# Patient Record
Sex: MALE | Race: WHITE | NOT HISPANIC OR LATINO | Employment: OTHER | URBAN - METROPOLITAN AREA
[De-identification: names, ages, dates, MRNs, and addresses within clinical notes are randomized per-mention and may not be internally consistent; named-entity substitution may affect disease eponyms.]

---

## 2017-06-30 ENCOUNTER — LAB VISIT (OUTPATIENT)
Dept: LAB | Facility: OTHER | Age: 47
End: 2017-06-30
Attending: INTERNAL MEDICINE
Payer: COMMERCIAL

## 2017-06-30 ENCOUNTER — TELEPHONE (OUTPATIENT)
Dept: INTERNAL MEDICINE | Facility: CLINIC | Age: 47
End: 2017-06-30

## 2017-06-30 ENCOUNTER — OFFICE VISIT (OUTPATIENT)
Dept: INTERNAL MEDICINE | Facility: CLINIC | Age: 47
End: 2017-06-30
Attending: INTERNAL MEDICINE
Payer: COMMERCIAL

## 2017-06-30 VITALS
OXYGEN SATURATION: 97 % | BODY MASS INDEX: 30.87 KG/M2 | DIASTOLIC BLOOD PRESSURE: 90 MMHG | HEIGHT: 75 IN | HEART RATE: 77 BPM | TEMPERATURE: 98 F | WEIGHT: 248.25 LBS | SYSTOLIC BLOOD PRESSURE: 132 MMHG

## 2017-06-30 DIAGNOSIS — Z00.00 ANNUAL PHYSICAL EXAM: Primary | ICD-10-CM

## 2017-06-30 DIAGNOSIS — Z00.00 ANNUAL PHYSICAL EXAM: ICD-10-CM

## 2017-06-30 DIAGNOSIS — Z11.3 SCREEN FOR STD (SEXUALLY TRANSMITTED DISEASE): ICD-10-CM

## 2017-06-30 DIAGNOSIS — R03.0 ELEVATED BLOOD PRESSURE READING: ICD-10-CM

## 2017-06-30 DIAGNOSIS — Z80.8 FAMILY HISTORY OF MELANOMA: ICD-10-CM

## 2017-06-30 DIAGNOSIS — J40 BRONCHITIS: ICD-10-CM

## 2017-06-30 LAB
ALBUMIN SERPL BCP-MCNC: 4 G/DL
ALP SERPL-CCNC: 66 U/L
ALT SERPL W/O P-5'-P-CCNC: 43 U/L
ANION GAP SERPL CALC-SCNC: 7 MMOL/L
AST SERPL-CCNC: 26 U/L
BASOPHILS # BLD AUTO: 0.02 K/UL
BASOPHILS NFR BLD: 0.4 %
BILIRUB SERPL-MCNC: 0.5 MG/DL
BUN SERPL-MCNC: 10 MG/DL
CALCIUM SERPL-MCNC: 9.3 MG/DL
CHLORIDE SERPL-SCNC: 105 MMOL/L
CHOLEST/HDLC SERPL: 6.5 {RATIO}
CO2 SERPL-SCNC: 27 MMOL/L
CREAT SERPL-MCNC: 1 MG/DL
DIFFERENTIAL METHOD: ABNORMAL
EOSINOPHIL # BLD AUTO: 0.1 K/UL
EOSINOPHIL NFR BLD: 1.3 %
ERYTHROCYTE [DISTWIDTH] IN BLOOD BY AUTOMATED COUNT: 12 %
EST. GFR  (AFRICAN AMERICAN): >60 ML/MIN/1.73 M^2
EST. GFR  (NON AFRICAN AMERICAN): >60 ML/MIN/1.73 M^2
GLUCOSE SERPL-MCNC: 113 MG/DL
HCT VFR BLD AUTO: 41.8 %
HDL/CHOLESTEROL RATIO: 15.3 %
HDLC SERPL-MCNC: 261 MG/DL
HDLC SERPL-MCNC: 40 MG/DL
HGB BLD-MCNC: 14.3 G/DL
LDLC SERPL CALC-MCNC: 181.8 MG/DL
LYMPHOCYTES # BLD AUTO: 2 K/UL
LYMPHOCYTES NFR BLD: 35.8 %
MCH RBC QN AUTO: 32.3 PG
MCHC RBC AUTO-ENTMCNC: 34.2 %
MCV RBC AUTO: 94 FL
MONOCYTES # BLD AUTO: 0.4 K/UL
MONOCYTES NFR BLD: 7.7 %
NEUTROPHILS # BLD AUTO: 3 K/UL
NEUTROPHILS NFR BLD: 54.6 %
NONHDLC SERPL-MCNC: 221 MG/DL
PLATELET # BLD AUTO: 279 K/UL
PMV BLD AUTO: 9.1 FL
POTASSIUM SERPL-SCNC: 4.4 MMOL/L
PROT SERPL-MCNC: 7.3 G/DL
RBC # BLD AUTO: 4.43 M/UL
SODIUM SERPL-SCNC: 139 MMOL/L
TRIGL SERPL-MCNC: 196 MG/DL
TSH SERPL DL<=0.005 MIU/L-ACNC: 1.33 UIU/ML
WBC # BLD AUTO: 5.48 K/UL

## 2017-06-30 PROCEDURE — 80061 LIPID PANEL: CPT

## 2017-06-30 PROCEDURE — 36415 COLL VENOUS BLD VENIPUNCTURE: CPT

## 2017-06-30 PROCEDURE — 87591 N.GONORRHOEAE DNA AMP PROB: CPT

## 2017-06-30 PROCEDURE — 86592 SYPHILIS TEST NON-TREP QUAL: CPT

## 2017-06-30 PROCEDURE — 85025 COMPLETE CBC W/AUTO DIFF WBC: CPT

## 2017-06-30 PROCEDURE — 99396 PREV VISIT EST AGE 40-64: CPT | Mod: S$GLB,,, | Performed by: INTERNAL MEDICINE

## 2017-06-30 PROCEDURE — 87340 HEPATITIS B SURFACE AG IA: CPT

## 2017-06-30 PROCEDURE — 84443 ASSAY THYROID STIM HORMONE: CPT

## 2017-06-30 PROCEDURE — 99999 PR PBB SHADOW E&M-EST. PATIENT-LVL IV: CPT | Mod: PBBFAC,,, | Performed by: INTERNAL MEDICINE

## 2017-06-30 PROCEDURE — 80053 COMPREHEN METABOLIC PANEL: CPT

## 2017-06-30 PROCEDURE — 86803 HEPATITIS C AB TEST: CPT

## 2017-06-30 PROCEDURE — 83036 HEMOGLOBIN GLYCOSYLATED A1C: CPT

## 2017-06-30 PROCEDURE — 86703 HIV-1/HIV-2 1 RESULT ANTBDY: CPT

## 2017-06-30 RX ORDER — DOXYCYCLINE 100 MG/1
100 CAPSULE ORAL 2 TIMES DAILY
Qty: 14 CAPSULE | Refills: 0 | Status: SHIPPED | OUTPATIENT
Start: 2017-06-30 | End: 2017-07-07

## 2017-06-30 NOTE — PROGRESS NOTES
"Subjective:       Patient ID: Michael Lowe is a 46 y.o. male.    Chief Complaint: URI    Here for annual visit    He has gained weight and is not exercising. He is aware of his dietary indiscretions and changes that need to occur. He has been doing some pushup lately which resulted in bilateral epicondylitis. He is interested in getting back into cycling and/or swimming. Productive cough for the past month that has continued to worsen over the last week. Patient denies F/C, SOB, eye pain, severe HA, or inability to maintain adequate PO intake. He has multiple right axillary skin tags that he would like removed. He would like STD screening. He is asymptomatic.                Review of Systems   Constitutional: Negative for appetite change, chills, fever and unexpected weight change.   HENT: Negative for hearing loss, sore throat and trouble swallowing.    Eyes: Negative for visual disturbance.   Respiratory: Negative for cough, chest tightness and shortness of breath.    Cardiovascular: Negative for chest pain and leg swelling.   Gastrointestinal: Negative for abdominal pain, blood in stool, constipation, diarrhea, nausea and vomiting.   Endocrine: Negative for polydipsia and polyuria.   Genitourinary: Negative for decreased urine volume, difficulty urinating, dysuria, frequency and urgency.   Musculoskeletal: Positive for arthralgias. Negative for gait problem.   Skin: Negative for rash.   Neurological: Negative for dizziness and numbness.   Psychiatric/Behavioral: The patient is not nervous/anxious.        Objective:      Vitals:    06/30/17 1048   BP: (!) 132/90   Pulse: 77   Temp: 98.3 °F (36.8 °C)   TempSrc: Oral   SpO2: 97%   Weight: 112.6 kg (248 lb 3.8 oz)   Height: 6' 3" (1.905 m)      Physical Exam   Constitutional: He is oriented to person, place, and time. He appears well-developed and well-nourished. No distress.   HENT:   Head: Normocephalic and atraumatic.   Mouth/Throat: Oropharynx is clear " and moist. No oropharyngeal exudate.   Eyes: Conjunctivae and EOM are normal. Pupils are equal, round, and reactive to light. No scleral icterus.   Neck: No thyromegaly present.   Cardiovascular: Normal rate, regular rhythm and normal heart sounds.    No murmur heard.  Pulmonary/Chest: Effort normal and breath sounds normal. He has no wheezes. He has no rales.   Abdominal: Soft. He exhibits no distension. There is no tenderness.   Musculoskeletal: He exhibits no edema or tenderness.   Lymphadenopathy:     He has no cervical adenopathy.   Neurological: He is alert and oriented to person, place, and time.   Skin: Skin is warm and dry.   Psychiatric: He has a normal mood and affect. His behavior is normal.       Assessment:       1. Annual physical exam    2. Family history of melanoma    3. Screen for STD (sexually transmitted disease)    4. Bronchitis    5. Elevated blood pressure reading        Plan:       Michael was seen today for uri.    Diagnoses and all orders for this visit:    Annual physical exam  -     Hemoglobin A1c; Future  -     Comprehensive metabolic panel; Future  -     CBC auto differential; Future  -     TSH; Future  -     Lipid panel; Future    Family history of melanoma  -     Ambulatory referral to Dermatology    Screen for STD (sexually transmitted disease)  -     Hepatitis C antibody; Future  -     C. trachomatis/N. gonorrhoeae by AMP DNA Urine; Future  -     Hepatitis B surface antigen; Future  -     RPR; Future  -     HIV-1 and HIV-2 antibodies; Future    Bronchitis  -     doxycycline (VIBRAMYCIN) 100 MG Cap; Take 1 capsule (100 mg total) by mouth 2 (two) times daily.    Elevated BP  -asymptomatic, borderline. Pt has requested diet/lifesyle intervention prior to medication initiation. F/U in 6 months to discuss. Office prompts discussed.         RTC in 6 months or sooner prn         Side effects of medication(s) were discussed in detail and patient voiced understanding.  Patient will call back  for any issues or complications.

## 2017-06-30 NOTE — PATIENT INSTRUCTIONS
Understanding Medial Epicondylitis    Several muscles attach to the arm at the elbow joint. The tough bands of tissue that attach muscle to bones are called tendons. The bone in the upper arm has knobs on the farthest end called epicondyles. Tendons attach some arm muscles to these knobs. The tissues in this area can become irritated.  Epicondylitis is the medical term for a painful elbow over the epicondyle. Medial refers to the inner side of the elbow. Medial epicondylitis is sometimes called golfers elbow.     How to say it  [facility]dye-lie-tis   Causes of medial epicondylitis  A painful inner elbow may be caused by:  · Using an elbow or hand the same way over and over  · Using poor form or too much force in a sport such as golf, tennis, or baseball  · Lifting too heavy a weight  · Other injuries to the arm or elbow  Symptoms of medial epicondylitis  · Pain or tenderness on the inside of the elbow that may travel down the forearm  · Pain when moving the wrist  · Pain or weakness when gripping something  · A crackling sound or grating feeling when moving the elbow  Treatment for medial epicondylitis  Treatments may include:  · Avoiding or changing the action that caused the problem. This helps prevent irritating the tissues more.  · Prescription or over-the-counter pain medicines. These help reduce inflammation, swelling, and pain.  · Cold or heat packs. These help reduce pain and swelling.  · Stretching and other exercises. These improve flexibility and strength.  · Physical therapy. This may include exercises or other treatments.  · Injections of medicine. This may relieve symptoms.  If other treatments do not relieve symptoms, you may need surgery.  Possible complications  If you dont give your elbow time to heal, symptoms may return or get worse. Follow your healthcare providers instructions on resting and treating your elbow.     When to call your healthcare provider  Call your  healthcare provider right away if you have any of these:  · Fever of 100.4°F (38°C) or higher, or as directed  · Redness, swelling, or warmth that gets worse  · Symptoms that dont get better with prescribed medicines, or get worse  · New symptoms   Date Last Reviewed: 3/10/2016  © 7585-3172 [employer]. [address] [place] [address], [place] [address]. All rights reserved. This information is not intended as a substitute for professional medical care. Always follow your healthcare professional's instructions.

## 2017-06-30 NOTE — TELEPHONE ENCOUNTER
Pt was seen in clinic on today.  He left to complete labs and came back in clinic b/c he didn't see an antibiotics that pt stated  said he was sending to pt pharmacy for cough and cold on his AVS.    Pt ask is  still sending that RX over or was it something he can  over the counter?    Please authorize and advise

## 2017-07-01 LAB
C TRACH DNA SPEC QL NAA+PROBE: NOT DETECTED
ESTIMATED AVG GLUCOSE: 126 MG/DL
HBA1C MFR BLD HPLC: 6 %
N GONORRHOEA DNA SPEC QL NAA+PROBE: NOT DETECTED

## 2017-07-03 LAB
HBV SURFACE AG SERPL QL IA: NEGATIVE
HCV AB SERPL QL IA: NEGATIVE
HIV 1+2 AB+HIV1 P24 AG SERPL QL IA: NEGATIVE
RPR SER QL: NORMAL

## 2017-07-20 ENCOUNTER — PATIENT MESSAGE (OUTPATIENT)
Dept: INTERNAL MEDICINE | Facility: CLINIC | Age: 47
End: 2017-07-20

## 2017-07-21 NOTE — TELEPHONE ENCOUNTER
Unknown. This is not a routine test and has not ever been done on our record. We usually do not test for this.

## 2018-02-06 ENCOUNTER — OFFICE VISIT (OUTPATIENT)
Dept: INTERNAL MEDICINE | Facility: CLINIC | Age: 48
End: 2018-02-06
Attending: INTERNAL MEDICINE
Payer: COMMERCIAL

## 2018-02-06 ENCOUNTER — HOSPITAL ENCOUNTER (OUTPATIENT)
Dept: RADIOLOGY | Facility: OTHER | Age: 48
Discharge: HOME | End: 2018-02-06
Attending: INTERNAL MEDICINE
Payer: COMMERCIAL

## 2018-02-06 VITALS
DIASTOLIC BLOOD PRESSURE: 72 MMHG | HEIGHT: 75 IN | HEART RATE: 83 BPM | SYSTOLIC BLOOD PRESSURE: 138 MMHG | WEIGHT: 250.69 LBS | BODY MASS INDEX: 31.17 KG/M2

## 2018-02-06 DIAGNOSIS — R07.81 RIB PAIN ON RIGHT SIDE: ICD-10-CM
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
## 2018-02-06 PROCEDURE — 3008F BODY MASS INDEX DOCD: CPT | Mod: S$GLB,,, | Performed by: INTERNAL MEDICINE

## 2018-02-06 PROCEDURE — 99213 OFFICE O/P EST LOW 20 MIN: CPT | Mod: S$GLB,,, | Performed by: INTERNAL MEDICINE

## 2018-02-06 PROCEDURE — 71110 X-RAY EXAM RIBS BIL 3 VIEWS: CPT | Mod: 26,,, | Performed by: RADIOLOGY

## 2018-02-06 PROCEDURE — 99999 PR PBB SHADOW E&M-EST. PATIENT-LVL III: CPT | Mod: PBBFAC,,, | Performed by: INTERNAL MEDICINE

## 2018-02-06 PROCEDURE — 71110 X-RAY EXAM RIBS BIL 3 VIEWS: CPT | Mod: TC,FY

## 2018-02-06 RX ORDER — TIZANIDINE 2 MG/1
2-4 TABLET ORAL EVERY 6 HOURS PRN
Qty: 45 TABLET | Refills: 0 | Status: SHIPPED | OUTPATIENT
Start: 2018-02-06 | End: 2018-02-16

## 2018-02-06 RX ORDER — OXYCODONE AND ACETAMINOPHEN 5; 325 MG/1; MG/1
1 TABLET ORAL EVERY 4 HOURS PRN
Qty: 45 TABLET | Refills: 0 | Status: SHIPPED | OUTPATIENT
Start: 2018-02-06 | End: 2018-04-18

## 2018-02-06 NOTE — PROGRESS NOTES
"Subjective:       Patient ID: Michael Lowe is a 47 y.o. male.    Chief Complaint: Rib Injury    Here for urgent visit    3 days ago while inebriated in [place] he was walking along a leaning palm tree trunk and slipped and fell landing on right chest and then spun around and fell 5-6 ft [place]. No instant pain, again inebriated but the following day awoke with significant pain. He denies F/C, SOB, abd pain, constipation, BRPR.          Review of Systems    Objective:      Vitals:    02/06/18 1526   BP: 138/72   Pulse: 83   Weight: 113.7 kg (250 lb 10.6 oz)   Height: 6' 3" (1.905 m)      Physical Exam   Constitutional: He is oriented to person, place, and time. He appears well-developed and well-nourished. He does not have a sickly appearance. No distress.   HENT:   Head: Normocephalic and atraumatic.   Eyes: Conjunctivae and EOM are normal. Right eye exhibits no discharge. Left eye exhibits no discharge. No scleral icterus.   Pulmonary/Chest: Effort normal. No respiratory distress. He has no decreased breath sounds.   Abdominal: Normal appearance and bowel sounds are normal. He exhibits no distension. There is no tenderness.       Neurological: He is alert and oriented to person, place, and time.   Skin: Skin is warm and dry. He is not diaphoretic.   Psychiatric: He has a normal mood and affect. His speech is normal.       Assessment:       1. Fall, sequela    2. Rib pain on right side        Plan:       Michael was seen today for rib injury.    Diagnoses and all orders for this visit:    Fall, sequela  -     X-Ray Ribs 3 Views Bilateral; Future    Rib pain on right side  -     X-Ray Ribs 3 Views Bilateral; Future  -     tiZANidine (ZANAFLEX) 2 MG tablet; Take 1-2 tablets (2-4 mg total) by mouth every 6 (six) hours as needed.  -     oxyCODONE-acetaminophen (PERCOCET) 5-325 mg per tablet; Take 1 tablet by mouth every 4 (four) hours as needed for Pain.             Side effects of medication(s) " were discussed in detail and patient voiced understanding.  Patient will call back for any issues or complications.

## 2018-04-18 ENCOUNTER — PATIENT MESSAGE (OUTPATIENT)
Dept: INTERNAL MEDICINE | Facility: CLINIC | Age: 48
End: 2018-04-18

## 2018-04-18 ENCOUNTER — OFFICE VISIT (OUTPATIENT)
Dept: INTERNAL MEDICINE | Facility: CLINIC | Age: 48
End: 2018-04-18
Attending: INTERNAL MEDICINE
Payer: COMMERCIAL

## 2018-04-18 ENCOUNTER — HOSPITAL ENCOUNTER (OUTPATIENT)
Dept: RADIOLOGY | Facility: OTHER | Age: 48
Discharge: HOME | End: 2018-04-18
Attending: INTERNAL MEDICINE
Payer: COMMERCIAL

## 2018-04-18 ENCOUNTER — HOSPITAL ENCOUNTER (OUTPATIENT)
Dept: CARDIOLOGY | Facility: OTHER | Age: 48
Discharge: HOME | End: 2018-04-18
Attending: INTERNAL MEDICINE
Payer: COMMERCIAL

## 2018-04-18 VITALS
DIASTOLIC BLOOD PRESSURE: 70 MMHG | HEART RATE: 86 BPM | BODY MASS INDEX: 30.42 KG/M2 | RESPIRATION RATE: 96 BRPM | SYSTOLIC BLOOD PRESSURE: 116 MMHG | WEIGHT: 244.69 LBS | HEIGHT: 75 IN

## 2018-04-18 DIAGNOSIS — Z00.00 ANNUAL PHYSICAL EXAM: Primary | ICD-10-CM

## 2018-04-18 DIAGNOSIS — Z91.89 AT RISK FOR CORONARY ARTERY DISEASE: ICD-10-CM

## 2018-04-18 DIAGNOSIS — R93.1 ELEVATED CORONARY ARTERY CALCIUM SCORE: Primary | ICD-10-CM

## 2018-04-18 DIAGNOSIS — R07.2 PRECORDIAL PAIN: ICD-10-CM

## 2018-04-18 DIAGNOSIS — G56.21 ULNAR NEUROPATHY AT ELBOW, RIGHT: ICD-10-CM

## 2018-04-18 PROCEDURE — 75571 CT HRT W/O DYE W/CA TEST: CPT | Mod: 26,,, | Performed by: RADIOLOGY

## 2018-04-18 PROCEDURE — 99214 OFFICE O/P EST MOD 30 MIN: CPT | Mod: S$GLB,,, | Performed by: INTERNAL MEDICINE

## 2018-04-18 PROCEDURE — 99999 PR PBB SHADOW E&M-EST. PATIENT-LVL IV: CPT | Mod: PBBFAC,,, | Performed by: INTERNAL MEDICINE

## 2018-04-18 PROCEDURE — 93010 ELECTROCARDIOGRAM REPORT: CPT | Mod: ,,, | Performed by: INTERNAL MEDICINE

## 2018-04-18 PROCEDURE — 93005 ELECTROCARDIOGRAM TRACING: CPT

## 2018-04-18 PROCEDURE — 75571 CT HRT W/O DYE W/CA TEST: CPT | Mod: TC

## 2018-04-18 RX ORDER — NAPROXEN SODIUM 220 MG/1
81 TABLET, FILM COATED ORAL DAILY
Start: 2018-04-18 | End: 2018-05-10

## 2018-04-18 RX ORDER — ATORVASTATIN CALCIUM 40 MG/1
40 TABLET, FILM COATED ORAL DAILY
Qty: 90 TABLET | Refills: 3 | Status: SHIPPED | OUTPATIENT
Start: 2018-04-18 | End: 2018-05-01 | Stop reason: SDUPTHER

## 2018-04-18 NOTE — PROGRESS NOTES
"Subjective:       Patient ID: Michael Lowe is a 47 y.o. male.    Chief Complaint: Annual Exam    Here for urgent visit    2 month hx of elbow pain. Symptoms are medially located, occur intermittently with certain movements, described as an ache and tightness. Hx of right ulnar neuropathy surgical release as well as right CTS release and resulting right finger triggering and stiffness. He denies any new weakness.     Riding his bike more in attempts to be healthier. He reports chest tightness that is substernal that only occurs with riding his bike very intensely and subsides with rest. This does not occur consistently. Also has random CP at rest that last seconds to minutes. No associated symptoms. Hx of elevated LDL not on statin. Not a smoker.           Review of Systems   Constitutional: Negative for appetite change, chills, fever and unexpected weight change.   HENT: Negative for hearing loss, sore throat and trouble swallowing.    Eyes: Negative for visual disturbance.   Respiratory: Negative for cough, chest tightness and shortness of breath.    Cardiovascular: Positive for chest pain. Negative for leg swelling.   Gastrointestinal: Negative for abdominal pain, blood in stool, constipation, diarrhea, nausea and vomiting.   Endocrine: Negative for polydipsia and polyuria.   Genitourinary: Negative for decreased urine volume, difficulty urinating, dysuria, frequency and urgency.   Musculoskeletal: Positive for arthralgias. Negative for gait problem.   Skin: Negative for rash.   Neurological: Negative for dizziness and numbness.   Psychiatric/Behavioral: The patient is not nervous/anxious.        Objective:      Vitals:    04/18/18 0830   BP: 116/70   Pulse: 86   Resp: (!) 96   Weight: 111 kg (244 lb 11.4 oz)   Height: 6' 3" (1.905 m)      Physical Exam   Constitutional: He is oriented to person, place, and time. He appears well-developed and well-nourished. No distress.   HENT:   Head: Normocephalic and " atraumatic.   Mouth/Throat: Oropharynx is clear and moist. No oropharyngeal exudate.   Eyes: Conjunctivae and EOM are normal. Pupils are equal, round, and reactive to light. No scleral icterus.   Neck: No thyromegaly present.   Cardiovascular: Normal rate, regular rhythm and normal heart sounds.    No murmur heard.  Pulmonary/Chest: Effort normal and breath sounds normal. He has no wheezes. He has no rales.   Abdominal: Soft. He exhibits no distension. There is no tenderness.   Musculoskeletal: He exhibits no edema or tenderness.   Lymphadenopathy:     He has no cervical adenopathy.   Neurological: He is alert and oriented to person, place, and time.   Skin: Skin is warm and dry.   Psychiatric: He has a normal mood and affect. His behavior is normal.       Assessment:       1. Annual physical exam    2. Ulnar neuropathy at elbow, right    3. At risk for coronary artery disease        Plan:       Michael was seen today for annual exam.    Diagnoses and all orders for this visit:    Annual physical exam  -     Comprehensive metabolic panel; Future  -     Lipid panel; Future  -     TSH; Future  -     CBC auto differential; Future  -     Hemoglobin A1c; Future    Ulnar neuropathy at elbow, right  -     Ambulatory referral to Orthopedics    At risk for coronary artery disease  -     CT Cardiac Scoring; Future  -     SCHEDULED EKG 12-LEAD (to Muse); Future       RTC in 3 months or sooner prn           Side effects of medication(s) were discussed in detail and patient voiced understanding.  Patient will call back for any issues or complications.

## 2018-04-20 ENCOUNTER — TELEPHONE (OUTPATIENT)
Dept: CARDIOLOGY | Facility: CLINIC | Age: 48
End: 2018-04-20

## 2018-04-20 NOTE — TELEPHONE ENCOUNTER
----- Message from Jennifer Arellano sent at 4/19/2018  8:53 AM CDT -----  Regarding: EKG order  Please put the EKG order in for the new pt. He's scheduled 5/1/18 at 3 pm    Thanks

## 2018-05-01 ENCOUNTER — HOSPITAL ENCOUNTER (OUTPATIENT)
Dept: CARDIOLOGY | Facility: CLINIC | Age: 48
Discharge: HOME | End: 2018-05-01
Payer: COMMERCIAL

## 2018-05-01 ENCOUNTER — OFFICE VISIT (OUTPATIENT)
Dept: CARDIOLOGY | Facility: CLINIC | Age: 48
End: 2018-05-01
Payer: COMMERCIAL

## 2018-05-01 VITALS
HEART RATE: 79 BPM | BODY MASS INDEX: 30.73 KG/M2 | WEIGHT: 247.13 LBS | HEIGHT: 75 IN | DIASTOLIC BLOOD PRESSURE: 74 MMHG | SYSTOLIC BLOOD PRESSURE: 147 MMHG

## 2018-05-01 DIAGNOSIS — R00.2 PALPITATION: ICD-10-CM

## 2018-05-01 DIAGNOSIS — E78.00 PURE HYPERCHOLESTEROLEMIA: ICD-10-CM

## 2018-05-01 DIAGNOSIS — I25.10 CORONARY ARTERY DISEASE INVOLVING NATIVE CORONARY ARTERY OF NATIVE HEART WITHOUT ANGINA PECTORIS: ICD-10-CM

## 2018-05-01 PROCEDURE — 99999 PR PBB SHADOW E&M-EST. PATIENT-LVL III: CPT | Mod: PBBFAC,,, | Performed by: INTERNAL MEDICINE

## 2018-05-01 PROCEDURE — 99244 OFF/OP CNSLTJ NEW/EST MOD 40: CPT | Mod: S$GLB,,, | Performed by: INTERNAL MEDICINE

## 2018-05-01 PROCEDURE — 93000 ELECTROCARDIOGRAM COMPLETE: CPT | Mod: S$GLB,,, | Performed by: INTERNAL MEDICINE

## 2018-05-01 RX ORDER — ASPIRIN 81 MG/1
81 TABLET ORAL DAILY
Qty: 1 TABLET | Refills: 0 | COMMUNITY
Start: 2018-05-01 | End: 2018-05-10

## 2018-05-01 RX ORDER — ATORVASTATIN CALCIUM 80 MG/1
80 TABLET, FILM COATED ORAL DAILY
Qty: 90 TABLET | Refills: 3 | Status: SHIPPED | OUTPATIENT
Start: 2018-05-01 | End: 2018-05-22 | Stop reason: SDUPTHER

## 2018-05-01 NOTE — PROGRESS NOTES
"Subjective:    Patient ID:  Michael Lowe is a 47 y.o. male who presents for evaluation of Elevated coronary artery calcium score and Precordial pain      HPI     The patient is a 47 year old male referred by Dr Salazar who ordered a CACs as a CV risk assessment and was 146. His LDL is 204. 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 He reports occasional non exertional left lateral chest "fluttering". There is no family history of CAD and has no hypertension or diabetes. EKG today is normal      Lab Results   Component Value Date     04/18/2018    K 4.1 04/18/2018     04/18/2018    CO2 24 04/18/2018    BUN 13 04/18/2018    CREATININE 1.0 04/18/2018     (H) 04/18/2018    HGBA1C 5.6 04/18/2018    AST 34 04/18/2018    ALT 45 (H) 04/18/2018    ALBUMIN 4.2 04/18/2018    PROT 7.4 04/18/2018    BILITOT 0.8 04/18/2018    WBC 4.84 04/18/2018    HGB 14.4 04/18/2018    HCT 43.1 04/18/2018    MCV 96 04/18/2018     04/18/2018    PSA 0.80 08/19/2013    TSH 1.335 04/18/2018         Lab Results   Component Value Date    CHOL 284 (H) 04/18/2018    HDL 47 04/18/2018    TRIG 163 (H) 04/18/2018       Lab Results   Component Value Date    LDLCALC 204.4 (H) 04/18/2018       Past Medical History:   Diagnosis Date    Carpal tunnel syndrome of right wrist     Cervical disc herniation     Low back pain        Current Outpatient Prescriptions:     aspirin 81 MG Chew, Take 1 tablet (81 mg total) by mouth once daily., Disp: , Rfl:     atorvastatin (LIPITOR) 80 MG tablet, Take 1 tablet (80 mg total) by mouth once daily., Disp: 90 tablet, Rfl: 3    lysine 500 mg Tab, Take 1 tablet by mouth once daily. , Disp: , Rfl:     milk thistle 175 mg tablet, Take 175 mg by mouth once daily., Disp: , Rfl:     omega-3 fatty acids-vitamin E (FISH OIL) 1,000 mg Cap, Take 1 capsule by mouth once daily., Disp: , Rfl:     aspirin (ECOTRIN) 81 MG EC tablet, Take 1 tablet (81 mg total) by mouth once " "daily., Disp: 1 tablet, Rfl: 0        Review of Systems   Constitution: Negative for decreased appetite, diaphoresis, fever, weakness, malaise/fatigue, weight gain and weight loss.   HENT: Negative for congestion, ear discharge, ear pain and nosebleeds.    Eyes: Negative for blurred vision, double vision and visual disturbance.   Cardiovascular: Negative for chest pain, claudication, cyanosis, dyspnea on exertion, irregular heartbeat, leg swelling, near-syncope, orthopnea, palpitations, paroxysmal nocturnal dyspnea and syncope.   Respiratory: Negative for cough, hemoptysis, shortness of breath, sleep disturbances due to breathing, snoring, sputum production and wheezing.    Endocrine: Negative for polydipsia, polyphagia and polyuria.   Hematologic/Lymphatic: Negative for adenopathy and bleeding problem. Does not bruise/bleed easily.   Skin: Negative for color change, nail changes, poor wound healing and rash.   Musculoskeletal: Negative for muscle cramps and muscle weakness.   Gastrointestinal: Negative for abdominal pain, anorexia, change in bowel habit, hematochezia, nausea and vomiting.   Genitourinary: Negative for dysuria, frequency and hematuria.   Neurological: Negative for brief paralysis, difficulty with concentration, excessive daytime sleepiness, dizziness, focal weakness, headaches, light-headedness, seizures and vertigo.   Psychiatric/Behavioral: Negative for altered mental status and depression.   Allergic/Immunologic: Negative for persistent infections.        Objective:BP (!) 147/74 (BP Location: Left arm, Patient Position: Sitting, BP Method: X-Large (Automatic))   Pulse 79   Ht 6' 3" (1.905 m)   Wt 112.1 kg (247 lb 2.2 oz)   BMI 30.89 kg/m²           Physical Exam   Constitutional: He is oriented to person, place, and time. He appears well-developed and well-nourished.   Borderline obese   HENT:   Head: Normocephalic.   Right Ear: External ear normal.   Left Ear: External ear normal.   Nose: " Nose normal.   Inspection of lips, teeth and gums normal   Eyes: EOM are normal. Pupils are equal, round, and reactive to light. No scleral icterus.   Neck: Normal range of motion. Neck supple. No JVD present. No tracheal deviation present. No thyromegaly present.   Cardiovascular: Normal rate, regular rhythm and intact distal pulses.  Exam reveals no gallop and no friction rub.    No murmur heard.  Pulses:       Dorsalis pedis pulses are 2+ on the right side, and 2+ on the left side.        Posterior tibial pulses are 2+ on the right side, and 2+ on the left side.   Pulmonary/Chest: Effort normal and breath sounds normal.   Abdominal: Bowel sounds are normal. He exhibits no distension. There is no hepatosplenomegaly. There is no tenderness. There is no guarding.   Musculoskeletal: Normal range of motion. He exhibits no edema or tenderness.   Lymphadenopathy:   Palpation of neck and groin lymph nodes normal   Neurological: He is alert and oriented to person, place, and time. No cranial nerve deficit. He exhibits normal muscle tone. Coordination normal.   Skin: Skin is dry.   Palpation of skin normal   Psychiatric: His behavior is normal. Judgment and thought content normal.         Assessment:       1. Coronary artery disease involving native coronary artery of native heart without angina pectoris    2. Pure hypercholesterolemia         Plan:       Michael was seen today for elevated coronary artery calcium score and precordial pain.    Diagnoses and all orders for this visit:    Coronary artery disease involving native coronary artery of native heart without angina pectoris  -     Lipid panel; Future; Expected date: 06/12/2018  -     atorvastatin (LIPITOR) 80 MG tablet; Take 1 tablet (80 mg total) by mouth once daily.  -     aspirin (ECOTRIN) 81 MG EC tablet; Take 1 tablet (81 mg total) by mouth once daily.    Pure hypercholesterolemia LDL goal <100  -     Lipid panel; Future; Expected date: 06/12/2018  -      atorvastatin (LIPITOR) 80 MG tablet; Take 1 tablet (80 mg total) by mouth once daily.

## 2018-05-01 NOTE — LETTER
May 1, 2018      Basilio Salazar MD  [address]  [address]  [facility] [address]           [facility]  [address]  [place] [address]  Phone: [phone]          Patient: Michael Lowe   MR Number: [id number]   YOB: 1970   Date of Visit: 5/1/2018       Dear Dr. Basilio Salazar:    Thank you for referring Michael Lowe to me for evaluation. Attached you will find relevant portions of my assessment and plan of care.    If you have questions, please do not hesitate to call me. I look forward to following Michael Lowe along with you.    Sincerely,    Monty Plasencia MD    Enclosure  CC:  No Recipients    If you would like to receive this communication electronically, please contact [email or web address] or [phone] to request more information on [employer] Link access.    For providers and/or their staff who would like to refer a patient to [facility], please contact us through our one-stop-shop provider referral line, [facility], at [phone].    If you feel you have received this communication in error or would no longer like to receive these types of communications, please e-mail [email or web address]

## 2018-05-02 ENCOUNTER — TELEPHONE (OUTPATIENT)
Dept: CARDIOLOGY | Facility: CLINIC | Age: 48
End: 2018-05-02

## 2018-05-02 DIAGNOSIS — R00.2 PALPITATION: Primary | ICD-10-CM

## 2018-05-02 NOTE — TELEPHONE ENCOUNTER
----- Message from Jocelyn Patten sent at 5/2/2018  7:28 AM CDT -----  Regarding: EKG ORDER  Please put in EKG order, thanks. Jocelyn [phone]

## 2018-05-04 ENCOUNTER — TELEPHONE (OUTPATIENT)
Dept: ORTHOPEDICS | Facility: CLINIC | Age: 48
End: 2018-05-04

## 2018-05-04 NOTE — TELEPHONE ENCOUNTER
----- Message from Emiliana Cameron MA sent at 5/4/2018  2:33 PM CDT -----  Contact: JASPER DUMONT [[id number]]      ----- Message -----  From: Sarba Cody  Sent: 5/4/2018   1:14 PM  To: Varun Burrell Staff              Name of Who is Calling: JASPER DUMONT [[id number]]      What is the request in detail:pt returning staff call back       Can the clinic reply by [facility]: no      What Number to Call Back if not in [facility]:[phone]

## 2018-05-07 DIAGNOSIS — M25.521 RIGHT ELBOW PAIN: Primary | ICD-10-CM

## 2018-05-07 NOTE — PROGRESS NOTES
Phone call to patient to schedule NP appt within 14 days. Rescheduled 5/21 appt to 5/10. All locations/dates/times given to pt

## 2018-05-10 ENCOUNTER — OFFICE VISIT (OUTPATIENT)
Dept: ORTHOPEDICS | Facility: CLINIC | Age: 48
End: 2018-05-10
Attending: INTERNAL MEDICINE
Payer: COMMERCIAL

## 2018-05-10 ENCOUNTER — HOSPITAL ENCOUNTER (OUTPATIENT)
Dept: RADIOLOGY | Facility: OTHER | Age: 48
Discharge: HOME | End: 2018-05-10
Attending: PHYSICIAN ASSISTANT
Payer: COMMERCIAL

## 2018-05-10 VITALS
SYSTOLIC BLOOD PRESSURE: 148 MMHG | BODY MASS INDEX: 30.71 KG/M2 | DIASTOLIC BLOOD PRESSURE: 93 MMHG | WEIGHT: 247 LBS | HEIGHT: 75 IN | HEART RATE: 74 BPM

## 2018-05-10 DIAGNOSIS — R20.0 FINGER NUMBNESS: ICD-10-CM

## 2018-05-10 DIAGNOSIS — M62.549 ATROPHY OF INTEROSSEOUS MUSCLE OF HAND: Primary | ICD-10-CM

## 2018-05-10 DIAGNOSIS — G56.01 PARTIAL THENAR ATROPHY OF RIGHT HAND: ICD-10-CM

## 2018-05-10 DIAGNOSIS — M25.521 RIGHT ELBOW PAIN: ICD-10-CM

## 2018-05-10 PROCEDURE — 73080 X-RAY EXAM OF ELBOW: CPT | Mod: 26,RT,, | Performed by: RADIOLOGY

## 2018-05-10 PROCEDURE — 73080 X-RAY EXAM OF ELBOW: CPT | Mod: TC,FY,RT

## 2018-05-10 PROCEDURE — 3008F BODY MASS INDEX DOCD: CPT | Mod: CPTII,S$GLB,, | Performed by: PHYSICIAN ASSISTANT

## 2018-05-10 PROCEDURE — 99203 OFFICE O/P NEW LOW 30 MIN: CPT | Mod: S$GLB,,, | Performed by: PHYSICIAN ASSISTANT

## 2018-05-10 PROCEDURE — 99999 PR PBB SHADOW E&M-EST. PATIENT-LVL III: CPT | Mod: PBBFAC,,, | Performed by: PHYSICIAN ASSISTANT

## 2018-05-10 NOTE — LETTER
May 14, 2018      Basilio Salazar MD  [address]  [address]  [facility] [address]           [facility]  [address], [address]  [facility] [address]  Phone: [phone]          Patient: Michael Lowe   MR Number: [id number]   YOB: 1970   Date of Visit: 5/10/2018       Dear Dr. Basilio Salazar:    Thank you for referring Michael Lowe to me for evaluation. Attached you will find relevant portions of my assessment and plan of care.    If you have questions, please do not hesitate to call me. I look forward to following Michael Lowe along with you.    Sincerely,    JUAN JOSÉ Ware    Enclosure  CC:  No Recipients    If you would like to receive this communication electronically, please contact [email or web address] or [phone] to request more information on [employer] Link access.    For providers and/or their staff who would like to refer a patient to [facility], please contact us through our one-stop-shop provider referral line, [facility], at [phone].    If you feel you have received this communication in error or would no longer like to receive these types of communications, please e-mail [email or web address]

## 2018-05-10 NOTE — PROGRESS NOTES
Subjective:      Patient ID: Michael Lowe is a 47 y.o. male.    Chief Complaint: Pain of the Right Elbow      HPI  Michael Lowe is a  47 y.o. male presenting today for right hand tingling and weakness.  He reports that he is ambidextrous due to his right hand history.  History of right carpal tunnel release and right ulnar nerve release in 10/2013.  He reports that he did perform some hand occupational therapy approximately a year to year and half after surgery, he states that he has not noticed any significant changes in his symptoms or hand musculature since his surgery.  He does admit to occasional tingling in the right hand.  He regularly performs home exercises, he reports that he does have good function of the right hand, but less strength than the left.      Review of patient's allergies indicates:   Allergen Reactions    No known drug allergies          Current Outpatient Prescriptions   Medication Sig Dispense Refill    atorvastatin (LIPITOR) 80 MG tablet Take 1 tablet (80 mg total) by mouth once daily. 90 tablet 3    lysine 500 mg Tab Take 1 tablet by mouth once daily.       milk thistle 175 mg tablet Take 175 mg by mouth once daily.      omega-3 fatty acids-vitamin E (FISH OIL) 1,000 mg Cap Take 1 capsule by mouth once daily.       No current facility-administered medications for this visit.        Past Medical History:   Diagnosis Date    Carpal tunnel syndrome of right wrist     Cervical disc herniation     Low back pain        Past Surgical History:   Procedure Laterality Date    right carpal tunnel release  10-23-13         Review of Systems:  Review of Systems   Constitution: Negative for chills and fever.   Skin: Negative for rash and suspicious lesions.   Musculoskeletal:        See HPI   Neurological: Negative for dizziness, headaches and light-headedness.   Psychiatric/Behavioral: Negative for depression. The patient is not nervous/anxious.          OBJECTIVE:  "    PHYSICAL EXAM:  Height: 6' 3" (190.5 cm) Weight: 112 kg (247 lb)     Vitals:    05/10/18 0918   BP: (!) 148/93   Pulse: 74     General    Vitals reviewed.  Constitutional: He is oriented to person, place, and time. He appears well-developed and well-nourished.   HENT:   Head: Normocephalic and atraumatic.   Neck: Normal range of motion.   Cardiovascular: Normal rate.    Pulmonary/Chest: Effort normal. No respiratory distress.   Neurological: He is alert and oriented to person, place, and time.   Psychiatric: He has a normal mood and affect. His behavior is normal. Judgment and thought content normal.             Musculoskeletal: Significant interosseous wasting noted on the right, right thenar atrophy also noted.  He has well-healed surgical scars right palm and elbow.  Nontender to palpation.  He does have good range of motion of the fingers, wrist, and elbow.  No appreciable ulnar subluxation at the elbow.  Neurovascular exam-good symmetrical sensation appreciated, decreased ulnar motor function on the right; AIN, PIN, radial, median intact.  Good capillary refill, 2+ radial pulses.  On the right Tinel's is positive at the cubital tunnel and Guyon's canal, negative carpal tunnel.  On the left Tinel's negative at the cubital tunnel and Guyon's canal, positive over the carpal tunnel area    RADIOGRAPHS:  Right Elbow X-Ray, 5/10/18  FINDINGS:  There is no fracture, dislocation, or bony erosion.   Impression   As above.     Comments: I have personally reviewed the imaging and I agree with the above radiologist's report.    ASSESSMENT/PLAN:   Michael was seen today for pain.    Diagnoses and all orders for this visit:    Atrophy of interosseous muscle of hand  -     EMG W/ ULTRASOUND AND NERVE CONDUCTION TEST 2 Extremities; Future    Partial thenar atrophy of right hand  -     EMG W/ ULTRASOUND AND NERVE CONDUCTION TEST 2 Extremities; Future    Finger numbness  -     EMG W/ ULTRASOUND AND NERVE CONDUCTION TEST 2 " Extremities; Future           - We talked at length about the anatomy and pathophysiology of   Encounter Diagnoses   Name Primary?    Atrophy of interosseous muscle of hand Yes    Partial thenar atrophy of right hand     Finger numbness        - Discussed case with Dr. Harden  - Bilateral EMG  - Follow up with Dr. Harden after EMG to discuss options.    - Call with questions or concerns

## 2018-05-14 ENCOUNTER — TELEPHONE (OUTPATIENT)
Dept: ORTHOPEDICS | Facility: CLINIC | Age: 48
End: 2018-05-14

## 2018-05-14 NOTE — TELEPHONE ENCOUNTER
----- Message from JUAN JOSÉ Ware sent at 5/14/2018 11:35 AM CDT -----  Regarding: Austin Ratliff  Pleas schedule appt for next day with Dr. Avila to discuss results

## 2018-05-21 ENCOUNTER — PROCEDURE VISIT (OUTPATIENT)
Dept: NEUROLOGY | Facility: CLINIC | Age: 48
End: 2018-05-21
Payer: COMMERCIAL

## 2018-05-21 DIAGNOSIS — M62.549 ATROPHY OF INTEROSSEOUS MUSCLE OF HAND: ICD-10-CM

## 2018-05-21 DIAGNOSIS — R20.0 FINGER NUMBNESS: ICD-10-CM

## 2018-05-21 DIAGNOSIS — G56.01 PARTIAL THENAR ATROPHY OF RIGHT HAND: ICD-10-CM

## 2018-05-21 PROCEDURE — 95913 NRV CNDJ TEST 13/> STUDIES: CPT | Mod: S$GLB,,, | Performed by: PSYCHIATRY & NEUROLOGY

## 2018-05-21 PROCEDURE — 95886 MUSC TEST DONE W/N TEST COMP: CPT | Mod: S$GLB,,, | Performed by: PSYCHIATRY & NEUROLOGY

## 2018-05-21 NOTE — PROCEDURES
Procedures     Please see NCS/EMG procedure report    Cristian Lombardo MD  [facility]

## 2018-05-22 ENCOUNTER — OFFICE VISIT (OUTPATIENT)
Dept: ORTHOPEDICS | Facility: CLINIC | Age: 48
End: 2018-05-22
Payer: COMMERCIAL

## 2018-05-22 ENCOUNTER — PATIENT MESSAGE (OUTPATIENT)
Dept: INTERNAL MEDICINE | Facility: CLINIC | Age: 48
End: 2018-05-22

## 2018-05-22 VITALS
HEART RATE: 83 BPM | HEIGHT: 75 IN | WEIGHT: 239 LBS | DIASTOLIC BLOOD PRESSURE: 86 MMHG | BODY MASS INDEX: 29.72 KG/M2 | SYSTOLIC BLOOD PRESSURE: 133 MMHG

## 2018-05-22 DIAGNOSIS — G56.01 CARPAL TUNNEL SYNDROME OF RIGHT WRIST: Primary | ICD-10-CM

## 2018-05-22 DIAGNOSIS — I25.10 CORONARY ARTERY DISEASE INVOLVING NATIVE CORONARY ARTERY OF NATIVE HEART WITHOUT ANGINA PECTORIS: ICD-10-CM

## 2018-05-22 DIAGNOSIS — E78.00 PURE HYPERCHOLESTEROLEMIA: ICD-10-CM

## 2018-05-22 PROCEDURE — 99213 OFFICE O/P EST LOW 20 MIN: CPT | Mod: S$GLB,,, | Performed by: ORTHOPAEDIC SURGERY

## 2018-05-22 PROCEDURE — 3008F BODY MASS INDEX DOCD: CPT | Mod: CPTII,S$GLB,, | Performed by: ORTHOPAEDIC SURGERY

## 2018-05-22 PROCEDURE — 99999 PR PBB SHADOW E&M-EST. PATIENT-LVL III: CPT | Mod: PBBFAC,,, | Performed by: ORTHOPAEDIC SURGERY

## 2018-05-22 RX ORDER — ATORVASTATIN CALCIUM 80 MG/1
80 TABLET, FILM COATED ORAL DAILY
Qty: 90 TABLET | Refills: 3 | Status: SHIPPED | OUTPATIENT
Start: 2018-05-22 | End: 2019-07-02 | Stop reason: SDUPTHER

## 2018-05-22 NOTE — PROGRESS NOTES
Subjective:      Patient ID: Michael Lowe is a 47 y.o. male.    Chief Complaint: Numbness of the Right Elbow      HPI  Michael Lowe is a  47 y.o. male presenting today for right hand tingling and weakness.  He reports that he is ambidextrous due to his right hand history.  History of right carpal tunnel release and right ulnar nerve release in 10/2013.  He reports that he did perform some hand occupational therapy approximately a year to year and half after surgery with Ashley.  The patient states that over the last few months he has noticed increase numbness and tingling in his right hand.  He has seen Nile and at the last clinic visit that ordered an EMG with nerve conduction study which showed significant deterioration of the median and ulnar nerve one compared to the Prior EMG with nerve conduction study before his last surgery.  He does state that his wasting in his hand and weakness is chronic and it was there before his last surgery although he may have noticed some progression since the surgery.  He does have a history of Dupuytren's on the right hand at the fourth digit.  He is here today to discuss further management.      Review of patient's allergies indicates:   Allergen Reactions    No known drug allergies          Current Outpatient Prescriptions   Medication Sig Dispense Refill    atorvastatin (LIPITOR) 80 MG tablet Take 1 tablet (80 mg total) by mouth once daily. 90 tablet 3    lysine 500 mg Tab Take 1 tablet by mouth once daily.       milk thistle 175 mg tablet Take 175 mg by mouth once daily.      omega-3 fatty acids-vitamin E (FISH OIL) 1,000 mg Cap Take 1 capsule by mouth once daily.       No current facility-administered medications for this visit.        Past Medical History:   Diagnosis Date    Carpal tunnel syndrome of right wrist     Cervical disc herniation     Low back pain        Past Surgical History:   Procedure Laterality Date    right carpal tunnel release   "10-23-13         Review of Systems:  Review of Systems   Constitution: Negative for chills and fever.   Skin: Negative for rash and suspicious lesions.   Musculoskeletal:        See HPI   Neurological: Negative for dizziness, headaches and light-headedness.   Psychiatric/Behavioral: Negative for depression. The patient is not nervous/anxious.          OBJECTIVE:     PHYSICAL EXAM:  Height: 6' 3" (190.5 cm) Weight: 108.4 kg (239 lb)     Vitals:    05/22/18 1501   BP: 133/86   Pulse: 83     General    Vitals reviewed.  Constitutional: He is oriented to person, place, and time. He appears well-developed and well-nourished.   HENT:   Head: Normocephalic and atraumatic.   Neck: Normal range of motion.   Cardiovascular: Normal rate.    Pulmonary/Chest: Effort normal. No respiratory distress.   Neurological: He is alert and oriented to person, place, and time.   Psychiatric: He has a normal mood and affect. His behavior is normal. Judgment and thought content normal.             Musculoskeletal: Significant interosseous wasting noted on the right, right thenar atrophy also noted.  He has well-healed surgical scars right palm and elbow.  Nontender to palpation.  He does have good range of motion of the fingers, wrist, and elbow.  No appreciable ulnar subluxation at the elbow.  Neurovascular exam-good symmetrical sensation appreciated, significantly decreased ulnar nerve function is noted patient is unable to cross his fingers unable to spread his hand; AIN, PIN, radial, median intact.  Good capillary refill, 2+ radial pulses.  On the right Tinel's is positive at the cubital tunnel and Guyon's canal, negative carpal tunnel.  On the left Tinel's negative at the cubital tunnel and Guyon's canal, positive over the carpal tunnel area    RADIOGRAPHS:  Right Elbow X-Ray, 5/10/18  FINDINGS:  There is no fracture, dislocation, or bony erosion.   Impression   As above.     Comments: I have personally reviewed the imaging and I agree " with the above radiologist's report.    ASSESSMENT/PLAN:   Michael was seen today for numbness.    Diagnoses and all orders for this visit:    Carpal tunnel syndrome of right wrist  -     Ambulatory Referral to Physical/Occupational Therapy      - We'll plan to get him into therapy with Ashley as she has seen him in the past and we are going to compare his  strength range of motion etc. to his preoperative and postoperative values  -After this we will bring him back to clinic and discuss whether he would be beneficial to do a revision carpal tunnel surgery and a revisional ulnar nerve decompression with transposition

## 2018-05-23 NOTE — PROGRESS NOTES
I have personally taken the history and examined this patient. I agree with the resident's note as stated above. Plan for OT re-eval. Not sure if a revision ulnar or median nerve decompression will help pt considering it has been so many years since surgery and he had interosseous wasting prior to his decompression ( per pt.) He also does not have pain or discomfort

## 2018-05-30 ENCOUNTER — CLINICAL SUPPORT (OUTPATIENT)
Dept: REHABILITATION | Facility: HOSPITAL | Age: 48
End: 2018-05-30
Attending: STUDENT IN AN ORGANIZED HEALTH CARE EDUCATION/TRAINING PROGRAM
Payer: COMMERCIAL

## 2018-05-30 DIAGNOSIS — M62.549 ATROPHY OF INTEROSSEOUS MUSCLE OF HAND: ICD-10-CM

## 2018-05-30 DIAGNOSIS — R29.898 RIGHT HAND WEAKNESS: Primary | ICD-10-CM

## 2018-05-30 PROCEDURE — 97165 OT EVAL LOW COMPLEX 30 MIN: CPT

## 2018-06-07 ENCOUNTER — PATIENT MESSAGE (OUTPATIENT)
Dept: INTERNAL MEDICINE | Facility: CLINIC | Age: 48
End: 2018-06-07

## 2018-06-12 ENCOUNTER — LAB VISIT (OUTPATIENT)
Dept: LAB | Facility: OTHER | Age: 48
End: 2018-06-12
Payer: COMMERCIAL

## 2018-06-12 DIAGNOSIS — E78.00 PURE HYPERCHOLESTEROLEMIA: ICD-10-CM

## 2018-06-12 DIAGNOSIS — I25.10 CORONARY ARTERY DISEASE INVOLVING NATIVE CORONARY ARTERY OF NATIVE HEART WITHOUT ANGINA PECTORIS: ICD-10-CM

## 2018-06-12 PROBLEM — M62.549: Status: ACTIVE | Noted: 2018-06-12

## 2018-06-12 LAB
CHOLEST SERPL-MCNC: 176 MG/DL
CHOLEST/HDLC SERPL: 4.1 {RATIO}
HDLC SERPL-MCNC: 43 MG/DL
HDLC SERPL: 24.4 %
LDLC SERPL CALC-MCNC: 91.6 MG/DL
NONHDLC SERPL-MCNC: 133 MG/DL
TRIGL SERPL-MCNC: 207 MG/DL

## 2018-06-12 PROCEDURE — 80061 LIPID PANEL: CPT

## 2018-06-12 PROCEDURE — 36415 COLL VENOUS BLD VENIPUNCTURE: CPT

## 2018-06-12 NOTE — PROGRESS NOTES
"Occupational Therapy -Hand / Wrist  Evaluation     Patient: Michael Lowe  Date of Evaluation: 2018  Referring Physician:  Dr. Austin Mendoza   Diagnosis: Ulnar / Median Nerve palsy R hand   1. Ulnar neuropathy at wrist, right    2. Median neuropathy, right    3. Distal muscle weakness    4. Fine motor impairment       MRN: [id number]     Referral Orders:   Eval and treat      Start Time: 915  End Time: 1005  Total Time: 50     Hand dominance: Right     Occupation:  Self-Employed, Fabricated outdoor furniture   Working presently:  yes  Workmen's Compensation:  no     Date of onset: 10/23/13  Involved area:  R hand distal muscle weakness  Mechanism of Injury:   R CTS and ulnar nerve decompression on 10/23/13  Past Medical History/Physical Systems Review: CAD;      Environmental Concerns/ Fall Risk:  None  Barriers to Learning: None   Cultural/Spiritual : None   Developmental/Education: None   Abuse/ Neglect: none   Nutritional Deficit: None   Language: None   Hearing/Vision Deficit: None   Other:      Subjective:  Patient was treated previously as a patient for this condition from 2014 to 2015 .  He returns to clinic today for re-evaluation to assess current condition and to compare to prior assessment prior to possible surgical consult by Dr. Austin Mendoza.     Pt reports I have trouble with coordination, pinching things, and my strength is weak"      Pain   At rest: 0 out of 10  With work/ Activity: 0 out of 10  Sleepin out of 10  Location of Pain : No pain reported      Patients goals for therapy are:  I'd like to get my hand working better, I need more strength in my hand     Objective:   Observation  :atrophy web space, intrinsic muscle weakness     Sensation: No significant sensory impairment ulnar/median nerves  Scar / Wound: old scar from previous CTR, cubital tunnel release   Edema:  None         Range of Motion:   WNL  + Tiffany's sign, + Froment's sign                                      "       Manual Muscle Test:  FPL 3/5  FPB 3/5  APL 3-/5  FDM 3-/5  ODM 3-/5  OP 3-/5  ADM 3-/5  DAB/PAD 2/5  lumb 2+/5     Atrophy of 1st web space, minimal ape/claw hand secondary to ulnar/median palsy and intrinsic weakness                                       Strength: (ROXANN Dynamometer in lbs.), Average 3 trials,             R) position II 55 lbs. (+15 lbs. )              R) position II  70 lbs. (+15 lbs)              L) Position  lbs.      Pinch Strength: (Pinch Gauge in psis), Average 3 trials                key pinch R ) 7  psi with compensatory pinch (-6.5 psi since last assessment in 2015)                                 L) 22 lbs.               2/3pt pinch untestable  (-7 psi since last assessment in 2015)    Fine motor coordination:  Requires increase time to complete grooved pegboard activity with compensatory pinch postures  In hand manipulation wfl's.      Functional Limitations:   Self Care / ADL: Limited use of R hand for ADLs, grooming, hygiene, gripping/pinching   Work/Activities: Limited use of R hand  for gripping, pinching, hand weakness, holding tools  Leisure: Limited use of R hand  For tool use, pinching, gripping      Treatment Included:   OT evaluation and reviewed  HEP including  Red theraputty for lumbrical squeeze, key pinch, gross sustained , thumb adduction; AROM for intrinsic minus, finger ab/ad, opposition, thumb flexion x 10 reps each, 3 x daily.  Provided patient with red/green theraputty for HEP.      Patient demonstrates continued atrophy of first dorsal webspace and thenar/hypothenar wasting, with ape hand type posture and intrinsic muscle weakness.  Sensory testing wnl's.   strength has shown 15 lbs. Increase, however pinch strength is significantly diminished since last assessment in 2015.  FM coordination is delayed with increased time and compensatory postures required to perform FM tasks; in hand manipulation wfl's.                 Assessment:   Problem  List : R hand       Decreased ROM   Decreased  and pinch strength   Decreased muscle strength   Decreased functional hand use   Decreased coordination   Ulnar/median palsy      Goals: ( 4 weeks)   1)  Patient to be IND with HEP and modalities for pain management     Plan:   Patient to be treated by Occupational Therapy  for eval only prior to return to MD for possible surgical consultation per MD request    during the certification period from  5/30/2018 to 6/30/2018     to achieve the established goals.    Treatment to include :  paraffin, fluidotherapy, manual therapy/joint mobilizations, orthotic fabrication/fitting/training, kinesiotaping, e-stim, modalities for pain management, US 3mhz, therapeutic exercises/activities,        strengthening,    scar and edema management, as well as any other treatments deemed necessary based on the patient's needs or progress.                I certify the need for these services furnished under this plan of treatment and while under my care     ____________________________________                         __________________  Physician/Referring Practitioner                                               Date of Signature

## 2018-10-01 ENCOUNTER — PATIENT MESSAGE (OUTPATIENT)
Dept: INTERNAL MEDICINE | Facility: CLINIC | Age: 48
End: 2018-10-01

## 2018-10-17 ENCOUNTER — PATIENT MESSAGE (OUTPATIENT)
Dept: ORTHOPEDICS | Facility: CLINIC | Age: 48
End: 2018-10-17

## 2018-11-27 ENCOUNTER — OFFICE VISIT (OUTPATIENT)
Dept: ORTHOPEDICS | Facility: CLINIC | Age: 48
End: 2018-11-27
Payer: COMMERCIAL

## 2018-11-27 VITALS
BODY MASS INDEX: 29.72 KG/M2 | HEART RATE: 98 BPM | HEIGHT: 75 IN | SYSTOLIC BLOOD PRESSURE: 145 MMHG | WEIGHT: 239 LBS | DIASTOLIC BLOOD PRESSURE: 87 MMHG

## 2018-11-27 DIAGNOSIS — G56.01 CARPAL TUNNEL SYNDROME OF RIGHT WRIST: Primary | ICD-10-CM

## 2018-11-27 PROCEDURE — 99214 OFFICE O/P EST MOD 30 MIN: CPT | Mod: S$GLB,,, | Performed by: ORTHOPAEDIC SURGERY

## 2018-11-27 PROCEDURE — 99999 PR PBB SHADOW E&M-EST. PATIENT-LVL III: CPT | Mod: PBBFAC,,, | Performed by: ORTHOPAEDIC SURGERY

## 2018-11-27 NOTE — PROGRESS NOTES
Subjective:      Patient ID: Michael Lowe is a 48 y.o. male.    Chief Complaint: Pain of the Right Hand      HPI  Michael Lowe is a  48 y.o. male presenting today for right hand tingling and weakness.  He reports that he is ambidextrous due to his right hand history.  History of right carpal tunnel release and right ulnar nerve release in 10/2013.  He reports that he did perform some hand occupational therapy approximately a year to year and half after surgery with Ashley.  The patient states that over the last few months he has noticed increase numbness and tingling in his right hand.  He has seen Nile and at the last clinic visit that ordered an EMG with nerve conduction study which showed significant deterioration of the median and ulnar nerve one compared to the Prior EMG with nerve conduction study before his last surgery.  He does state that his wasting in his hand and weakness is chronic and it was there before his last surgery although he may have noticed some progression since the surgery.  He does have a history of Dupuytren's on the right hand at the fourth digit.  He is here today to discuss further management.    12/6/18  Pt presents for follow up after undergoing a re-evaluation by OT. OT eval showed increase in  strength however significant decrease in pinch strength. Symptoms remain the same.       Review of patient's allergies indicates:   Allergen Reactions    No known drug allergies          Current Outpatient Medications   Medication Sig Dispense Refill    atorvastatin (LIPITOR) 80 MG tablet Take 1 tablet (80 mg total) by mouth once daily. 90 tablet 3    lysine 500 mg Tab Take 1 tablet by mouth once daily.       milk thistle 175 mg tablet Take 175 mg by mouth once daily.      omega-3 fatty acids-vitamin E (FISH OIL) 1,000 mg Cap Take 1 capsule by mouth once daily.       No current facility-administered medications for this visit.        Past Medical History:   Diagnosis  "Date    Carpal tunnel syndrome of right wrist     Cervical disc herniation     Low back pain        Past Surgical History:   Procedure Laterality Date    RUTHY-TRANSFORAMINAL Right 8/21/2013    Performed by Peña Hernandez MD at [facility] [place] [facility]    RELEASE, CARPAL TUNNEL Right 10/23/2013    Performed by Rlel Jenkins MD at [facility] [place]    RELEASE-NERVE-ULNAR Right 10/23/2013    Performed by Rell eJnkins MD at [facility] [place]    right carpal tunnel release  10-23-13         Review of Systems:  Review of Systems   Constitution: Negative for chills and fever.   Skin: Negative for rash and suspicious lesions.   Musculoskeletal:        See HPI   Neurological: Negative for dizziness, headaches and light-headedness.   Psychiatric/Behavioral: Negative for depression. The patient is not nervous/anxious.          OBJECTIVE:     PHYSICAL EXAM:  Height: 6' 3" (190.5 cm) Weight: 108.4 kg (239 lb)     Vitals:    11/27/18 1611   BP: (!) 145/87   Pulse: 98     General    Vitals reviewed.  Constitutional: He is oriented to person, place, and time. He appears well-developed and well-nourished.   HENT:   Head: Normocephalic and atraumatic.   Neck: Normal range of motion.   Cardiovascular: Normal rate.    Pulmonary/Chest: Effort normal. No respiratory distress.   Neurological: He is alert and oriented to person, place, and time.   Psychiatric: He has a normal mood and affect. His behavior is normal. Judgment and thought content normal.             Musculoskeletal: Significant interosseous wasting noted on the right, right thenar atrophy also noted.  He has well-healed surgical scars right palm and elbow.  Nontender to palpation.  He does have good range of motion of the fingers, wrist, and elbow.  No appreciable ulnar subluxation at the elbow.  Neurovascular exam-good symmetrical sensation appreciated, significantly decreased ulnar nerve function is noted patient is unable to cross his fingers unable to spread his hand; AIN, PIN, radial, " median intact.  Good capillary refill, 2+ radial pulses.  On the right Tinel's is positive at the cubital tunnel and Guyon's canal, negative carpal tunnel.  On the left Tinel's negative at the cubital tunnel and Guyon's canal, positive over the carpal tunnel area    RADIOGRAPHS:  Right Elbow X-Ray, 5/10/18  FINDINGS:  There is no fracture, dislocation, or bony erosion.   Impression   As above.     Comments: I have personally reviewed the imaging and I agree with the above radiologist's report.    EMG 5/21/18  Impression   This is an abnormal study. There is electrophysiologic evidence of:   1. Severe right carpal tunnel syndrome. When compared to the previous study done in 2013 the median nerve sensory and motor responses have significantly worsened in terms of axonal loss.   2. Severe right ulnar neuropathy. Again, when compared to the previous study there has been a significant degree of both sensory and motor axonal loss during the interval, with more profound losses evident in the motor study.   3. A mild left carpal tunnel syndrome.   4. Active and chronic denervation of muscles innervated by the right median and ulnar nerves.     Given the normal responses in the right MAC and LAC, a brachial plexus lesion is not likely to be responsible for the patients condition.     ASSESSMENT/PLAN:   Michael was seen today for pain.    Diagnoses and all orders for this visit:    Carpal tunnel syndrome of right wrist      -treatment options discussed. Plan to proceed with surgery. Consents reviewed and signed.   -RTC post op

## 2018-11-27 NOTE — PROGRESS NOTES
Consent for surgery. Pt has severe interossoeus wasting. Hx of surgical release by neurosurg.  I have explained the risks, benefits, and alternatives of the procedure to the patient in great detail. The patient voices understanding and all questions have been answered. The patient agrees with to proceed as planned. Consents were performed in clinic.

## 2018-12-14 DIAGNOSIS — G56.01 RIGHT CARPAL TUNNEL SYNDROME: Primary | ICD-10-CM

## 2018-12-14 DIAGNOSIS — G56.21 ULNAR NERVE ENTRAPMENT AT RIGHT ELBOW: ICD-10-CM

## 2018-12-28 ENCOUNTER — ANESTHESIA EVENT (OUTPATIENT)
Dept: SURGERY | Facility: OTHER | Age: 48
End: 2018-12-28
Payer: COMMERCIAL

## 2018-12-28 ENCOUNTER — HOSPITAL ENCOUNTER (OUTPATIENT)
Dept: PREADMISSION TESTING | Facility: OTHER | Age: 48
Discharge: HOME | End: 2018-12-28
Attending: ORTHOPAEDIC SURGERY
Payer: COMMERCIAL

## 2018-12-28 VITALS
SYSTOLIC BLOOD PRESSURE: 127 MMHG | BODY MASS INDEX: 29.22 KG/M2 | OXYGEN SATURATION: 96 % | DIASTOLIC BLOOD PRESSURE: 74 MMHG | HEART RATE: 73 BPM | HEIGHT: 75 IN | TEMPERATURE: 98 F | WEIGHT: 235 LBS

## 2018-12-28 RX ORDER — SODIUM CHLORIDE, SODIUM LACTATE, POTASSIUM CHLORIDE, CALCIUM CHLORIDE 600; 310; 30; 20 MG/100ML; MG/100ML; MG/100ML; MG/100ML
INJECTION, SOLUTION INTRAVENOUS CONTINUOUS
Status: CANCELLED | OUTPATIENT
Start: 2018-12-28

## 2018-12-28 RX ORDER — LIDOCAINE HYDROCHLORIDE 10 MG/ML
0.5 INJECTION, SOLUTION EPIDURAL; INFILTRATION; INTRACAUDAL; PERINEURAL ONCE
Status: CANCELLED | OUTPATIENT
Start: 2018-12-28 | End: 2018-12-28

## 2018-12-28 NOTE — DISCHARGE INSTRUCTIONS
PRE-ADMIT TESTING -  [phone]    [address]  [place] [facility]          Your surgery has been scheduled at [facility]. We are pleased to have the opportunity to serve you. For Further Information please call [phone].    On the day of surgery please report to the Information Desk on the 1st floor.    · CONTACT YOUR PHYSICIAN'S OFFICE THE DAY PRIOR TO YOUR SURGERY TO OBTAIN YOUR ARRIVAL TIME.     · The evening before surgery do not eat anything after 9 p.m. ( this includes hard candy, chewing gum and mints).  You may only have GATORADE, POWERADE AND WATER  from 9 p.m. until you leave your home.   DO NOT DRINK ANY LIQUIDS ON THE WAY TO THE HOSPITAL.      SPECIAL MEDICATION INSTRUCTIONS: TAKE medications checked off by the Anesthesiologist on your Medication List.    Angiogram Patients: Take medications as instructed by your physician, including aspirin.     Surgery Patients:    If you take ASPIRIN - Your PHYSICIAN/SURGEON will need to inform you IF/OR when you need to stop taking aspirin prior to your surgery.     Do Not take any medications containing IBUPROFEN.  Do Not Wear any make-up or dark nail polish   (especially eye make-up) to surgery. If you come to surgery with makeup on you will be required to remove the makeup or nail polish.    Do not shave your surgical area at least 5 days prior to your surgery. The surgical prep will be performed at the hospital according to Infection Control regulations.    Leave all valuables at home.   Do Not wear any jewelry or watches, including any metal in body piercings.  Contact Lens must be removed before surgery. Either do not wear the contact lens or bring a case and solution for storage.  Please bring a container for eyeglasses or dentures as required.  Bring any paperwork your physician has provided, such as consent forms,  history and physicals, doctor's orders, etc.   Bring comfortable clothes that are loose fitting to wear upon  discharge. Take into consideration the type of surgery being performed.  Maintain your diet as advised per your physician the day prior to surgery.      Adequate rest the night before surgery is advised.   Park in the Parking lot behind the hospital or in the [facility] across the street from the parking lot. Parking is complimentary.  If you will be discharged the same day as your procedure, please arrange for a responsible adult to drive you home or to accompany you if traveling by taxi.   YOU WILL NOT BE PERMITTED TO DRIVE OR TO LEAVE THE HOSPITAL ALONE AFTER SURGERY.   It is strongly recommended that you arrange for someone to remain with you for the first 24 hrs following your surgery.       Thank you for your cooperation.  The Staff of [facility].        Bathing Instructions                                                                 Please shower the evening before and morning of your procedure with    ANTIBACTERIAL SOAP. ( DIAL, etc )  Concentrate on the surgical area   for at least 3 minutes and rinse completely. Dry off as usual.   Do not use any deodorant, powder, body lotions, perfume, after shave or    cologne.

## 2018-12-28 NOTE — ANESTHESIA PREPROCEDURE EVALUATION
12/28/2018  Michael Lowe is a 48 y.o., male.    Anesthesia Evaluation    I have reviewed the Patient Summary Reports.    I have reviewed the Nursing Notes.   I have reviewed the Medications.     Review of Systems  Anesthesia Hx:  No problems with previous Anesthesia  History of prior surgery of interest to airway management or planning: Previous anesthesia: General Oct 2013 Rt Elbow surg with general anesthesia.  Procedure performed at an [facility]. Denies Family Hx of Anesthesia complications.   Denies Personal Hx of Anesthesia complications.   Social:  Non-Smoker    Hematology/Oncology:  Hematology Normal   Oncology Normal     EENT/Dental:EENT/Dental Normal   Cardiovascular:  Cardiovascular Normal     Pulmonary:  Pulmonary Normal    Renal/:  Renal/ Normal     Hepatic/GI:  Hepatic/GI Normal    Musculoskeletal:  Musculoskeletal Normal    Neurological:  Neurology Normal    Dermatological:  Skin Normal    Psych:  Psychiatric Normal           Physical Exam  General:  Well nourished    Airway/Jaw/Neck:  Airway Findings: Mouth Opening: Normal Tongue: Normal  Mallampati: I      Dental:  Dental Findings: In tact        Mental Status:  Mental Status Findings:         Anesthesia Plan  Type of Anesthesia, risks & benefits discussed:  Anesthesia Type:  general  Patient's Preference:   Intra-op Monitoring Plan:   Intra-op Monitoring Plan Comments:   Post Op Pain Control Plan: multimodal analgesia  Post Op Pain Control Plan Comments:   Induction:   IV  Beta Blocker:         Informed Consent: Patient understands risks and agrees with Anesthesia plan.  Questions answered. Anesthesia consent signed with patient.  ASA Score: 1     Day of Surgery Review of History & Physical:    H&P update referred to the surgeon.     Anesthesia Plan Notes: No labs,plan GA        Ready For Surgery From Anesthesia  Perspective.

## 2019-01-03 ENCOUNTER — TELEPHONE (OUTPATIENT)
Dept: ORTHOPEDICS | Facility: CLINIC | Age: 49
End: 2019-01-03

## 2019-01-03 RX ORDER — HYDROCODONE BITARTRATE AND ACETAMINOPHEN 5; 325 MG/1; MG/1
1-2 TABLET ORAL EVERY 6 HOURS PRN
Qty: 56 TABLET | Refills: 0 | Status: SHIPPED | OUTPATIENT
Start: 2019-01-03 | End: 2019-05-23

## 2019-01-03 NOTE — TELEPHONE ENCOUNTER
----- Message from Johanna Gagnon sent at 1/3/2019  9:47 AM CST -----  Contact: JASPER DUMONT [[id number]]  Name of Who is Calling: JASPER DUMONT [[id number]]        What is the request in detail: Pt requesting time of procedure scheduled on tomorrow. Contact and advise at your earliest convenience.  Thanks-          Can the clinic reply by [facility]: Y    What Number to Call Back if not in [facility]: [phone]

## 2019-01-03 NOTE — TELEPHONE ENCOUNTER
Michael Lowe notified of arrival time 0800 for surgery on 1/4/19 with Dr. SALVATORE Harden at [facility]. Post Op appointment made, slip in mail. Reminded of need for a ride home from surgery.

## 2019-01-03 NOTE — H&P
Subjective:       Patient ID: Michael Lowe is a 48 y.o. male.     Chief Complaint: Pain of the Right Hand        HPI  Michael Lowe is a  48 y.o. male presenting today for right hand tingling and weakness.  He reports that he is ambidextrous due to his right hand history.  History of right carpal tunnel release and right ulnar nerve release in 10/2013.  He reports that he did perform some hand occupational therapy approximately a year to year and half after surgery with [facility].  The patient states that over the last few months he has noticed increase numbness and tingling in his right hand.  He has seen [facility] and at the last clinic visit that ordered an EMG with nerve conduction study which showed significant deterioration of the median and ulnar nerve one compared to the Prior EMG with nerve conduction study before his last surgery.  He does state that his wasting in his hand and weakness is chronic and it was there before his last surgery although he may have noticed some progression since the surgery.  He does have a history of Dupuytren's on the right hand at the fourth digit.  He is here today to discuss further management.     12/6/18  Pt presents for follow up after undergoing a re-evaluation by OT. OT eval showed increase in  strength however significant decrease in pinch strength. Symptoms remain the same. Plan for surgery on 1/4/19             Review of patient's allergies indicates:   Allergen Reactions    No known drug allergies            Current Medications          Current Outpatient Medications   Medication Sig Dispense Refill    atorvastatin (LIPITOR) 80 MG tablet Take 1 tablet (80 mg total) by mouth once daily. 90 tablet 3    lysine 500 mg Tab Take 1 tablet by mouth once daily.         milk thistle 175 mg tablet Take 175 mg by mouth once daily.        omega-3 fatty acids-vitamin E (FISH OIL) 1,000 mg Cap Take 1 capsule by mouth once daily.          No current  facility-administered medications for this visit.                  Past Medical History:   Diagnosis Date    Carpal tunnel syndrome of right wrist      Cervical disc herniation      Low back pain                 Past Surgical History:   Procedure Laterality Date    RUTHY-TRANSFORAMINAL Right 8/21/2013     Performed by Peña Hernandez MD at [facility]    RELEASE, CARPAL TUNNEL Right 10/23/2013     Performed by Rell Jenkins MD at [facility] OR    RELEASE-NERVE-ULNAR Right 10/23/2013     Performed by Rell Jenkins MD at [facility] OR    right carpal tunnel release   10-23-13            Review of Systems:  Review of Systems   Constitution: Negative for chills and fever.   Skin: Negative for rash and suspicious lesions.   Musculoskeletal:        See HPI   Neurological: Negative for dizziness, headaches and light-headedness.   Psychiatric/Behavioral: Negative for depression. The patient is not nervous/anxious.             OBJECTIVE:      PHYSICAL EXAM:    General     Vitals reviewed.  Constitutional: He is oriented to person, place, and time. He appears well-developed and well-nourished.   HENT:   Head: Normocephalic and atraumatic.   Neck: Normal range of motion.   Cardiovascular: Normal rate.    Pulmonary/Chest: Effort normal. No respiratory distress.   Neurological: He is alert and oriented to person, place, and time.   Psychiatric: He has a normal mood and affect. His behavior is normal. Judgment and thought content normal.                  Musculoskeletal: Significant interosseous wasting noted on the right, right thenar atrophy also noted.  He has well-healed surgical scars right palm and elbow.  Nontender to palpation.  He does have good range of motion of the fingers, wrist, and elbow.  No appreciable ulnar subluxation at the elbow.  Neurovascular exam-good symmetrical sensation appreciated, significantly decreased ulnar nerve function is noted patient is unable to cross his fingers unable to spread his hand;  AIN, PIN, radial, median intact.  Good capillary refill, 2+ radial pulses.  On the right Tinel's is positive at the cubital tunnel and Guyon's canal, negative carpal tunnel.  On the left Tinel's negative at the cubital tunnel and Guyon's canal, positive over the carpal tunnel area     RADIOGRAPHS:  Right Elbow X-Ray, 5/10/18  FINDINGS:  There is no fracture, dislocation, or bony erosion.   Impression   As above.      Comments: I have personally reviewed the imaging and I agree with the above radiologist's report.     EMG 5/21/18  Impression   This is an abnormal study. There is electrophysiologic evidence of:   1. Severe right carpal tunnel syndrome. When compared to the previous study done in 2013 the median nerve sensory and motor responses have significantly worsened in terms of axonal loss.   2. Severe right ulnar neuropathy. Again, when compared to the previous study there has been a significant degree of both sensory and motor axonal loss during the interval, with more profound losses evident in the motor study.   3. A mild left carpal tunnel syndrome.   4. Active and chronic denervation of muscles innervated by the right median and ulnar nerves.      Given the normal responses in the right MAC and LAC, a brachial plexus lesion is not likely to be responsible for the patients condition.      ASSESSMENT/PLAN:   Michael was seen today for pain.     Diagnoses and all orders for this visit:     Carpal tunnel syndrome of right wrist         -treatment options discussed. Plan to proceed with surgery. Consents reviewed and signed.   -RTC post op

## 2019-01-04 ENCOUNTER — ANESTHESIA (OUTPATIENT)
Dept: SURGERY | Facility: OTHER | Age: 49
End: 2019-01-04
Payer: COMMERCIAL

## 2019-01-04 ENCOUNTER — HOSPITAL ENCOUNTER (OUTPATIENT)
Facility: OTHER | Age: 49
Discharge: HOME | End: 2019-01-04
Attending: ORTHOPAEDIC SURGERY | Admitting: ORTHOPAEDIC SURGERY
Payer: COMMERCIAL

## 2019-01-04 VITALS
DIASTOLIC BLOOD PRESSURE: 71 MMHG | BODY MASS INDEX: 29.22 KG/M2 | HEIGHT: 75 IN | HEART RATE: 69 BPM | RESPIRATION RATE: 18 BRPM | TEMPERATURE: 98 F | WEIGHT: 235 LBS | SYSTOLIC BLOOD PRESSURE: 124 MMHG | OXYGEN SATURATION: 98 %

## 2019-01-04 DIAGNOSIS — G56.01 RIGHT CARPAL TUNNEL SYNDROME: ICD-10-CM

## 2019-01-04 DIAGNOSIS — G56.21 ULNAR NERVE ENTRAPMENT AT RIGHT ELBOW: ICD-10-CM

## 2019-01-04 DIAGNOSIS — R29.898 RIGHT HAND WEAKNESS: Primary | ICD-10-CM

## 2019-01-04 DIAGNOSIS — M62.549 ATROPHY OF INTEROSSEOUS MUSCLE OF HAND: ICD-10-CM

## 2019-01-04 PROCEDURE — 25000003 PHARM REV CODE 250: Performed by: NURSE ANESTHETIST, CERTIFIED REGISTERED

## 2019-01-04 PROCEDURE — 63600175 PHARM REV CODE 636 W HCPCS: Performed by: SPECIALIST

## 2019-01-04 PROCEDURE — 25000003 PHARM REV CODE 250: Performed by: ANESTHESIOLOGY

## 2019-01-04 PROCEDURE — 71000015 HC POSTOP RECOV 1ST HR: Performed by: ORTHOPAEDIC SURGERY

## 2019-01-04 PROCEDURE — 36000707: Performed by: ORTHOPAEDIC SURGERY

## 2019-01-04 PROCEDURE — 63600175 PHARM REV CODE 636 W HCPCS: Performed by: STUDENT IN AN ORGANIZED HEALTH CARE EDUCATION/TRAINING PROGRAM

## 2019-01-04 PROCEDURE — 37000008 HC ANESTHESIA 1ST 15 MINUTES: Performed by: ORTHOPAEDIC SURGERY

## 2019-01-04 PROCEDURE — 25000003 PHARM REV CODE 250: Performed by: STUDENT IN AN ORGANIZED HEALTH CARE EDUCATION/TRAINING PROGRAM

## 2019-01-04 PROCEDURE — 63600175 PHARM REV CODE 636 W HCPCS: Performed by: NURSE ANESTHETIST, CERTIFIED REGISTERED

## 2019-01-04 PROCEDURE — 37000009 HC ANESTHESIA EA ADD 15 MINS: Performed by: ORTHOPAEDIC SURGERY

## 2019-01-04 PROCEDURE — 64718 PR REVISE ULNAR NERVE AT ELBOW: ICD-10-PCS | Mod: 22,RT,, | Performed by: ORTHOPAEDIC SURGERY

## 2019-01-04 PROCEDURE — 71000033 HC RECOVERY, INTIAL HOUR: Performed by: ORTHOPAEDIC SURGERY

## 2019-01-04 PROCEDURE — 36000706: Performed by: ORTHOPAEDIC SURGERY

## 2019-01-04 PROCEDURE — 64718 REVISE ULNAR NERVE AT ELBOW: CPT | Mod: 22,RT,, | Performed by: ORTHOPAEDIC SURGERY

## 2019-01-04 PROCEDURE — 64721 CARPAL TUNNEL SURGERY: CPT | Mod: 51,22,RT, | Performed by: ORTHOPAEDIC SURGERY

## 2019-01-04 PROCEDURE — 64721 PR REVISE MEDIAN N/CARPAL TUNNEL SURG: ICD-10-PCS | Mod: 51,22,RT, | Performed by: ORTHOPAEDIC SURGERY

## 2019-01-04 PROCEDURE — 63600175 PHARM REV CODE 636 W HCPCS: Performed by: ANESTHESIOLOGY

## 2019-01-04 PROCEDURE — 25000003 PHARM REV CODE 250: Performed by: ORTHOPAEDIC SURGERY

## 2019-01-04 PROCEDURE — 27800903 OPTIME MED/SURG SUP & DEVICES OTHER IMPLANTS: Performed by: ORTHOPAEDIC SURGERY

## 2019-01-04 DEVICE — MEMBRANE CLARIX 1K 4.0X3.0CM: Type: IMPLANTABLE DEVICE | Site: WRIST | Status: FUNCTIONAL

## 2019-01-04 DEVICE — MEMBRANE CLARIX 1K 2.5CMX2.5CM: Type: IMPLANTABLE DEVICE | Site: WRIST | Status: FUNCTIONAL

## 2019-01-04 DEVICE — PROTECTOR AXOGUARD NERVE 10X40: Type: IMPLANTABLE DEVICE | Site: WRIST | Status: FUNCTIONAL

## 2019-01-04 RX ORDER — HYDROMORPHONE HYDROCHLORIDE 2 MG/ML
0.4 INJECTION, SOLUTION INTRAMUSCULAR; INTRAVENOUS; SUBCUTANEOUS EVERY 5 MIN PRN
Status: DISCONTINUED | OUTPATIENT
Start: 2019-01-04 | End: 2019-01-04 | Stop reason: HOSPADM

## 2019-01-04 RX ORDER — ONDANSETRON 2 MG/ML
4 INJECTION INTRAMUSCULAR; INTRAVENOUS DAILY PRN
Status: DISCONTINUED | OUTPATIENT
Start: 2019-01-04 | End: 2019-01-04 | Stop reason: HOSPADM

## 2019-01-04 RX ORDER — ONDANSETRON 2 MG/ML
INJECTION INTRAMUSCULAR; INTRAVENOUS
Status: DISCONTINUED | OUTPATIENT
Start: 2019-01-04 | End: 2019-01-04

## 2019-01-04 RX ORDER — PHENYLEPHRINE HYDROCHLORIDE 10 MG/ML
INJECTION INTRAVENOUS
Status: DISCONTINUED | OUTPATIENT
Start: 2019-01-04 | End: 2019-01-04

## 2019-01-04 RX ORDER — MUPIROCIN 20 MG/G
OINTMENT TOPICAL
Status: DISPENSED | OUTPATIENT
Start: 2019-01-04

## 2019-01-04 RX ORDER — OXYCODONE HYDROCHLORIDE 5 MG/1
5 TABLET ORAL
Status: DISCONTINUED | OUTPATIENT
Start: 2019-01-04 | End: 2019-01-04 | Stop reason: HOSPADM

## 2019-01-04 RX ORDER — BACITRACIN ZINC 500 UNIT/G
OINTMENT (GRAM) TOPICAL
Status: DISCONTINUED | OUTPATIENT
Start: 2019-01-04 | End: 2019-01-04 | Stop reason: HOSPADM

## 2019-01-04 RX ORDER — FENTANYL CITRATE 50 UG/ML
100 INJECTION, SOLUTION INTRAMUSCULAR; INTRAVENOUS EVERY 5 MIN PRN
Status: COMPLETED | OUTPATIENT
Start: 2019-01-04 | End: 2019-01-04

## 2019-01-04 RX ORDER — MIDAZOLAM HYDROCHLORIDE 1 MG/ML
5 INJECTION INTRAMUSCULAR; INTRAVENOUS ONCE
Status: CANCELLED | OUTPATIENT
Start: 2019-01-04 | End: 2019-01-04

## 2019-01-04 RX ORDER — ROPIVACAINE HYDROCHLORIDE 5 MG/ML
INJECTION, SOLUTION EPIDURAL; INFILTRATION; PERINEURAL
Status: DISCONTINUED | OUTPATIENT
Start: 2019-01-04 | End: 2019-01-04

## 2019-01-04 RX ORDER — CEFAZOLIN SODIUM 1 G/3ML
2 INJECTION, POWDER, FOR SOLUTION INTRAMUSCULAR; INTRAVENOUS
Status: COMPLETED | OUTPATIENT
Start: 2019-01-04 | End: 2019-01-04

## 2019-01-04 RX ORDER — BUPIVACAINE HYDROCHLORIDE 2.5 MG/ML
INJECTION, SOLUTION EPIDURAL; INFILTRATION; INTRACAUDAL
Status: DISCONTINUED | OUTPATIENT
Start: 2019-01-04 | End: 2019-01-04 | Stop reason: HOSPADM

## 2019-01-04 RX ORDER — GLYCOPYRROLATE 0.2 MG/ML
INJECTION INTRAMUSCULAR; INTRAVENOUS
Status: DISCONTINUED | OUTPATIENT
Start: 2019-01-04 | End: 2019-01-04

## 2019-01-04 RX ORDER — ACETAMINOPHEN 10 MG/ML
INJECTION, SOLUTION INTRAVENOUS
Status: DISCONTINUED | OUTPATIENT
Start: 2019-01-04 | End: 2019-01-04

## 2019-01-04 RX ORDER — PROPOFOL 10 MG/ML
VIAL (ML) INTRAVENOUS
Status: DISCONTINUED | OUTPATIENT
Start: 2019-01-04 | End: 2019-01-04

## 2019-01-04 RX ORDER — SODIUM CHLORIDE, SODIUM LACTATE, POTASSIUM CHLORIDE, CALCIUM CHLORIDE 600; 310; 30; 20 MG/100ML; MG/100ML; MG/100ML; MG/100ML
INJECTION, SOLUTION INTRAVENOUS CONTINUOUS
Status: DISCONTINUED | OUTPATIENT
Start: 2019-01-04 | End: 2019-01-04 | Stop reason: HOSPADM

## 2019-01-04 RX ORDER — FENTANYL CITRATE 50 UG/ML
25 INJECTION, SOLUTION INTRAMUSCULAR; INTRAVENOUS EVERY 5 MIN PRN
Status: DISCONTINUED | OUTPATIENT
Start: 2019-01-04 | End: 2019-01-04 | Stop reason: HOSPADM

## 2019-01-04 RX ORDER — MEPERIDINE HYDROCHLORIDE 50 MG/ML
12.5 INJECTION INTRAMUSCULAR; INTRAVENOUS; SUBCUTANEOUS ONCE AS NEEDED
Status: DISCONTINUED | OUTPATIENT
Start: 2019-01-04 | End: 2019-01-04 | Stop reason: HOSPADM

## 2019-01-04 RX ORDER — SODIUM CHLORIDE 0.9 % (FLUSH) 0.9 %
3 SYRINGE (ML) INJECTION
Status: DISCONTINUED | OUTPATIENT
Start: 2019-01-04 | End: 2019-01-04 | Stop reason: HOSPADM

## 2019-01-04 RX ORDER — LIDOCAINE HYDROCHLORIDE 10 MG/ML
0.5 INJECTION, SOLUTION EPIDURAL; INFILTRATION; INTRACAUDAL; PERINEURAL ONCE
Status: DISCONTINUED | OUTPATIENT
Start: 2019-01-04 | End: 2019-01-04 | Stop reason: HOSPADM

## 2019-01-04 RX ORDER — LIDOCAINE HCL/PF 100 MG/5ML
SYRINGE (ML) INTRAVENOUS
Status: DISCONTINUED | OUTPATIENT
Start: 2019-01-04 | End: 2019-01-04

## 2019-01-04 RX ORDER — MIDAZOLAM HYDROCHLORIDE 1 MG/ML
INJECTION INTRAMUSCULAR; INTRAVENOUS
Status: DISCONTINUED | OUTPATIENT
Start: 2019-01-04 | End: 2019-01-04

## 2019-01-04 RX ADMIN — FENTANYL CITRATE 50 MCG: 50 INJECTION, SOLUTION INTRAMUSCULAR; INTRAVENOUS at 01:01

## 2019-01-04 RX ADMIN — ROPIVACAINE HYDROCHLORIDE 30 ML: 5 INJECTION, SOLUTION EPIDURAL; INFILTRATION; PERINEURAL at 02:01

## 2019-01-04 RX ADMIN — ACETAMINOPHEN 1000 MG: 10 INJECTION, SOLUTION INTRAVENOUS at 01:01

## 2019-01-04 RX ADMIN — PROPOFOL 200 MG: 10 INJECTION, EMULSION INTRAVENOUS at 11:01

## 2019-01-04 RX ADMIN — GLYCOPYRROLATE 0.2 MG: 0.2 INJECTION, SOLUTION INTRAMUSCULAR; INTRAVENOUS at 11:01

## 2019-01-04 RX ADMIN — CARBOXYMETHYLCELLULOSE SODIUM 2 DROP: 2.5 SOLUTION/ DROPS OPHTHALMIC at 11:01

## 2019-01-04 RX ADMIN — SODIUM CHLORIDE, SODIUM LACTATE, POTASSIUM CHLORIDE, AND CALCIUM CHLORIDE: 600; 310; 30; 20 INJECTION, SOLUTION INTRAVENOUS at 01:01

## 2019-01-04 RX ADMIN — PHENYLEPHRINE HYDROCHLORIDE 100 MCG: 10 INJECTION INTRAVENOUS at 12:01

## 2019-01-04 RX ADMIN — CEFAZOLIN 2 G: 330 INJECTION, POWDER, FOR SOLUTION INTRAMUSCULAR; INTRAVENOUS at 11:01

## 2019-01-04 RX ADMIN — SODIUM CHLORIDE, SODIUM LACTATE, POTASSIUM CHLORIDE, AND CALCIUM CHLORIDE: 600; 310; 30; 20 INJECTION, SOLUTION INTRAVENOUS at 10:01

## 2019-01-04 RX ADMIN — PHENYLEPHRINE HYDROCHLORIDE 100 MCG: 10 INJECTION INTRAVENOUS at 11:01

## 2019-01-04 RX ADMIN — HYDROMORPHONE HYDROCHLORIDE 0.4 MG: 2 INJECTION INTRAMUSCULAR; INTRAVENOUS; SUBCUTANEOUS at 02:01

## 2019-01-04 RX ADMIN — OXYCODONE HYDROCHLORIDE 5 MG: 5 TABLET ORAL at 02:01

## 2019-01-04 RX ADMIN — MIDAZOLAM HYDROCHLORIDE 2 MG: 1 INJECTION, SOLUTION INTRAMUSCULAR; INTRAVENOUS at 11:01

## 2019-01-04 RX ADMIN — MUPIROCIN: 20 OINTMENT TOPICAL at 09:01

## 2019-01-04 RX ADMIN — ONDANSETRON 4 MG: 2 INJECTION INTRAMUSCULAR; INTRAVENOUS at 01:01

## 2019-01-04 RX ADMIN — LIDOCAINE HYDROCHLORIDE 50 MG: 20 INJECTION, SOLUTION INTRAVENOUS at 11:01

## 2019-01-04 RX ADMIN — FENTANYL CITRATE 100 MCG: 50 INJECTION, SOLUTION INTRAMUSCULAR; INTRAVENOUS at 11:01

## 2019-01-04 RX ADMIN — PROPOFOL 50 MG: 10 INJECTION, EMULSION INTRAVENOUS at 12:01

## 2019-01-04 NOTE — BRIEF OP NOTE
Brief Operative Note     SUMMARY     Surgery Date: 1/4/2019     Surgeon(s) and Role:     * Mackenzie Harden MD - Primary    Assisting Surgeon: None    Pre-op Diagnosis:  Right carpal tunnel syndrome [G56.01]  Ulnar nerve entrapment at right elbow [G56.21]    Post-op Diagnosis:  Right carpal tunnel syndrome [G56.01]  Ulnar nerve entrapment at right elbow [G56.21]    Procedure(s) (LRB):  RELEASE, CARPAL TUNNEL right revision (Right)  RELEASE, ULNAR TUNNEL right elbow (submuscular transposition) (Right)    Anesthesia: General    Description of Procedure:   Right revision carpal tunnel release  Right elbow revision ulnar nerve decompression, with submuscular transposition    Findings/Key Components:  Right revision carpal tunnel release - Axogen nerve wrap and clarix membrane  Right elbow revision ulnar nerve decompression, with submuscular transposition- Axogen nerve wrap and clarix membrane    Estimated Blood Loss: Minimal         Specimens Removed:   Specimen (12h ago, onward)    None          Discharge Note      SUMMARY     Admit Date: 1/4/2019    Attending Physician: Mackenzie Harden MD     Discharge Physician: Mackenzie Harden MD    Discharge Date: 1/4/2019     Final Diagnosis: Right carpal tunnel syndrome [G56.01]  Ulnar nerve entrapment at right elbow [G56.21]    Hospital Course: Patient was admitted for an outpatient procedure and tolerated the procedure well with no complications.    Disposition: Home or Self Care    Follow Up/Patient Instructions:   Current Discharge Medication List      CONTINUE these medications which have NOT CHANGED    Details   atorvastatin (LIPITOR) 80 MG tablet Take 1 tablet (80 mg total) by mouth once daily.  Qty: 90 tablet, Refills: 3    Associated Diagnoses: Coronary artery disease involving native coronary artery of native heart without angina pectoris; Pure hypercholesterolemia      C/sourcherry/celery/grape seed (TART CHERRY ORAL) Take by mouth.       HYDROcodone-acetaminophen (NORCO) 5-325 mg per tablet Take 1-2 tablets by mouth every 6 (six) hours as needed for Pain.  Qty: 56 tablet, Refills: 0      lysine 500 mg Tab Take 1 tablet by mouth once daily.       milk thistle 175 mg tablet Take 175 mg by mouth once daily.      omega-3 fatty acids-vitamin E (FISH OIL) 1,000 mg Cap Take 1 capsule by mouth once daily.           Follow-up Information     Follow up In 2 weeks.    Why:  For suture removal, For wound re-check               Discharge Procedure Orders (must include Diet, Follow-up, Activity)   Discharge Procedure Orders (must include Diet, Follow-up, Activity)   Ambulatory Referral to Physical/Occupational Therapy   Referral Priority: Routine Referral Type: Physical Medicine   Referral Reason: Specialty Services Required   Referred to Provider: ALEX WEEMS Specialty: Occupational Therapy   Number of Visits Requested: 1     Keep surgical extremity elevated     Lifting restrictions     Notify your health care provider if you experience any of the following:  temperature >100.4     Notify your health care provider if you experience any of the following:  severe uncontrolled pain     Notify your health care provider if you experience any of the following:  redness, tenderness, or signs of infection (pain, swelling, redness, odor or green/yellow discharge around incision site)     Notify your health care provider if you experience any of the following:  worsening rash     Leave dressing on - Keep it clean, dry, and intact until clinic visit     Activity as tolerated      Certification of Assistant at Surgery       Surgery Date: 1/4/2019     Participating Surgeons:  Surgeon(s) and Role:     * Mackenzie Harden MD - Primary    Procedures:  Procedure(s) (LRB):  RELEASE, CARPAL TUNNEL right revision (Right)  RELEASE, ULNAR TUNNEL right elbow (submuscular transposition) (Right)    Assistant Surgeon's Certification of Necessity:  I understand that  section 1842 (b) (6) (d) of the Social Security Act generally prohibits Medicare Part B reasonable charge payment for the services of assistants at surgery in teaching hospitals when qualified residents are available to furnish such services. I certify that the services for which payment is claimed were medically necessary, and that no qualified resident was available to perform the services. I further understand that these services are subject to post-payment review by the Medicare carrier.      JUAN JOSÉ Ware    01/04/2019  2:01 PM

## 2019-01-04 NOTE — TRANSFER OF CARE
"Anesthesia Transfer of Care Note    Patient: Michael Lowe    Procedure(s) Performed: Procedure(s) (LRB):  RELEASE, CARPAL TUNNEL right revision (Right)  RELEASE, ULNAR TUNNEL right elbow (submuscular transposition) (Right)    Patient location: PACU    Anesthesia Type: general    Transport from OR: Transported from OR on 2-3 L/min O2 by NC with adequate spontaneous ventilation    Post pain: adequate analgesia    Post assessment: no apparent anesthetic complications    Post vital signs: stable    Level of consciousness: awake, alert and oriented    Nausea/Vomiting: no nausea/vomiting    Complications: none    Transfer of care protocol was followed      Last vitals:   Visit Vitals  /67 (BP Location: Left arm, Patient Position: Lying)   Pulse 84   Temp 36.6 °C (97.9 °F) (Oral)   Resp 18   Ht 6' 3" (1.905 m)   Wt 106.6 kg (234 lb 16 oz)   SpO2 96%   BMI 29.37 kg/m²     "

## 2019-01-04 NOTE — DISCHARGE INSTRUCTIONS
Anesthesia: After Your Surgery  Youve just had surgery. During surgery, you received medication called anesthesia to keep you comfortable and pain-free. After surgery, you may experience some pain or nausea. This is common. Here are some tips for feeling better and recovering after surgery.    Going home  Your doctor or nurse will show you how to take care of yourself when you go home. He or she will also answer your questions. Have an adult family member or friend drive you home. For the first 24 hours after your surgery:  · Do not drive or use heavy equipment.  · Do not make important decisions or sign legal documents.  · Avoid alcohol.  · Have someone stay with you, if needed. He or she can watch for problems and help keep you safe.  Be sure to keep all follow-up appointments with your doctor. And rest after your procedure for as long as your doctor tells you to.    Coping with pain  If you have pain after surgery, pain medication will help you feel better. Take it as directed, before pain becomes severe. Also, ask your doctor or pharmacist about other ways to control pain, such as with heat, ice, and relaxation. And follow any other instructions your surgeon or nurse gives you.    URINARY RETENTION  Should you experience a decrease in your urine output or are unable to urinate following surgery, this can be due to the medications given during surgery.  We recommend you going to the nearest Emergency Department.    Tips for taking pain medication  To get the best relief possible, remember these points:  · Pain medications can upset your stomach. Taking them with a little food may help.  · Most pain relievers taken by mouth need at least 20 to 30 minutes to take effect.  · Taking medication on a schedule can help you remember to take it. Try to time your medication so that you can take it before beginning an activity, such as dressing, walking, or sitting down for dinner.  · Constipation is a common side  effect of pain medications. Contact your doctor before taking any medications like laxatives or stool softeners to help relieve constipation. Also ask about any dietary restrictions, because drinking lots of fluids and eating foods like fruits and vegetables that are high in fiber can also help. Remember, dont take laxatives unless your surgeon has prescribed them.  · Mixing alcohol and pain medication can cause dizziness and slow your breathing. It can even be fatal. Dont drink alcohol while taking pain medication.  · Pain medication can slow your reflexes. Dont drive or operate machinery while taking pain medication.  If your health care provider tells you to take acetaminophen to help relieve your pain, ask him or her how much you are supposed to take each day. (Acetaminophen is the generic name for Tylenol and other brand-name pain relievers.) Acetaminophen or other pain relievers may interact with your prescription medicines or other over-the-counter (OTC) drugs. Some prescription medications contain acetaminophen along with other active ingredients. Using both prescription and OTC acetaminophen for pain can cause you to overdose. The FDA recommends that you read the labels on your OTC medications carefully. This will help you to clearly understand the list of active ingredients, dosing instructions, and any warnings. It may also help you avoid taking too much acetaminophen. If you have questions or don't understand the information, ask your pharmacist or health care provider to explain it to you before you take the OTC medication.    Managing nausea  Some people have an upset stomach after surgery. This is often due to anesthesia, pain, pain medications, or the stress of surgery. The following tips will help you manage nausea and get good nutrition as you recover. If you were on a special diet before surgery, ask your doctor if you should follow it during recovery. These tips may help:  · Dont push  yourself to eat. Your body will tell you when to eat and how much.  · Start off with clear liquids and soup. They are easier to digest.  · Progress to semi-solid foods (mashed potatoes, applesauce, and gelatin) as you feel ready.  · Slowly move to solid foods. Dont eat fatty, rich, or spicy foods at first.  · Dont force yourself to have three large meals a day. Instead, eat smaller amounts more often.  · Take pain medications with a small amount of solid food, such as crackers or toast to avoid nausea.      Call your surgeon if    · You feel too sleepy, dizzy, or groggy (medication may be too strong).  · You have side effects like nausea, vomiting, or skin changes (rash, itching, or hives).   © 4600-5457 [employer]. [address] [place] [address], [place] [address]. All rights reserved. This information is not intended as a substitute for professional medical care. Always follow your healthcare professional's instructions.

## 2019-01-04 NOTE — PLAN OF CARE
Michael Lowe has met all discharge criteria from Phase II. Vital Signs are stable, ambulating  without difficulty. Discharge instructions given, patient verbalized understanding. Discharged from facility via wheelchair in stable condition.

## 2019-01-04 NOTE — ANESTHESIA PROCEDURE NOTES
Right supraclavicular nerve block    Patient location during procedure: holding area   Block not for primary anesthetic.  Reason for block: at surgeon's request and post-op pain management   Post-op Pain Location: right elbow  Start time: 1/4/2019 2:37 PM  Timeout: 1/4/2019 2:36 PM   End time: 1/4/2019 2:46 PM  Staffing  Anesthesiologist: Chad Parekh MD  Performed: anesthesiologist   Preanesthetic Checklist  Completed: patient identified, site marked, surgical consent, pre-op evaluation, timeout performed, IV checked, risks and benefits discussed and monitors and equipment checked  Peripheral Block  Patient position: supine  Prep: ChloraPrep and site prepped and draped  Patient monitoring: heart rate, cardiac monitor, continuous pulse ox and frequent blood pressure checks  Block type: supraclavicular  Laterality: right  Injection technique: single shot  Needle  Needle type: Echogenic   Needle gauge: 22 G  Needle length: 4 in  Needle localization: anatomical landmarks, nerve stimulator and ultrasound guidance   -ultrasound image captured on disc.  Assessment  Injection assessment: negative aspiration, negative parasthesia and local visualized surrounding nerve  Paresthesia pain: none  Heart rate change: no  Slow fractionated injection: yes

## 2019-01-04 NOTE — INTERVAL H&P NOTE
The patient has been examined and the H&P has been reviewed:    I concur with the findings and no changes have occurred since H&P was written.Pt understands that with this severe nerve compression that he has had over time, the nerve changes most likely are permanent . He wishes to proceed with surgery    Anesthesia/Surgery risks, benefits and alternative options discussed and understood by patient/family.          Active Hospital Problems    Diagnosis  POA    Right carpal tunnel syndrome [G56.01]  Yes      Resolved Hospital Problems   No resolved problems to display.

## 2019-01-04 NOTE — ANESTHESIA POSTPROCEDURE EVALUATION
"Anesthesia Post Evaluation    Patient: Michael Lowe    Procedure(s) Performed: Procedure(s) (LRB):  RELEASE, CARPAL TUNNEL right revision (Right)  RELEASE, ULNAR TUNNEL right elbow (submuscular transposition) (Right)    Final Anesthesia Type: general  Patient location during evaluation: PACU  Patient participation: Yes- Able to Participate  Level of consciousness: oriented and awake  Post-procedure vital signs: reviewed and stable  Pain management: adequate  Airway patency: patent  PONV status at discharge: No PONV  Anesthetic complications: no      Cardiovascular status: hemodynamically stable  Respiratory status: unassisted, spontaneous ventilation and room air  Hydration status: euvolemic  Follow-up not needed.        Visit Vitals  /71 (BP Location: Left arm, Patient Position: Lying)   Pulse 69   Temp 36.8 °C (98.3 °F) (Oral)   Resp 18   Ht 6' 3" (1.905 m)   Wt 106.6 kg (234 lb 16 oz)   SpO2 98%   BMI 29.37 kg/m²       Pain/Wilber Score: Pain Rating Prior to Med Admin: 7 (1/4/2019  2:14 PM)  Pain Rating Post Med Admin: 0 (1/4/2019  2:50 PM)  Wilber Score: 10 (1/4/2019  3:00 PM)        "

## 2019-01-07 ENCOUNTER — TELEPHONE (OUTPATIENT)
Dept: ORTHOPEDICS | Facility: CLINIC | Age: 49
End: 2019-01-07

## 2019-01-07 NOTE — OP NOTE
DATE OF PROCEDURE:  01/04/2019.    SERVICE:  Orthopedics.    ATTENDING SURGEON:  Mackenzie Harden M.D.    ASSISTANT SURGEON:  JUAN JOSÉ Ware.    PREOPERATIVE DIAGNOSES:  1.  Right cubital tunnel, recurrent.  2.  Right carpal tunnel syndrome, recurrent.    POSTOPERATIVE DIAGNOSES:  1.  Right cubital tunnel, recurrent.  2.  Right carpal tunnel syndrome, recurrent with significant scarring around the   right ulnar nerve and right median nerve at the carpal tunnel.    PROCEDURE:  1.  Right ulnar nerve decompression at the elbow with neurolysis under loupe   magnification, submuscular transposition wrapping as AxoGen nerve wrap with   Clarix placement.  2.  Right median nerve neurolysis under loupe magnification at the carpal tunnel   in addition, wrapping the median nerve with AxoGen nerve wrap and Clarix   membrane placement.  2.  Splint application.  3.  Complexity modifier due to revision surgery.    ANESTHESIA:  General.    FLUIDS:  Lactated Ringer's.    BLOOD LOSS:  Minimal.  No blood was given.    TOURNIQUET TIME:  1 hour.    PACKS AND DRAINS:  None.    IMPLANTS:  Amniox membrane x2 and AxoGen nerve wrap x2.    INDICATIONS FOR PROCEDURE:  Mr. Lowe is a 48-year-old male who has had   significant muscle wasting and has intrinsics and numbness in the median and   ulnar nerve distribution.  He had undergone decompression of the carpal tunnel   and the cubital tunnel by another surgeon.  The patient continued to have   wasting.  The patient sought a second opinion.  EMG nerve conduction study was   performed and showed that he had continued carpal tunnel.  After reviewing this   with the patient and on exam, we discussed with the patient that most likely   even performing a revision surgery, his muscle strength would not return and   that he continued to have atrophy.  The patient has almost what like a claw like   hand.  We also discussed sensation may or may not return, but we are hoping for   pain  relief.  The patient understands.  He wishes to proceed with surgery.  He   knows the risks and he understands his condition.  Consents were signed in   clinic and reviewed again preoperatively.    PROCEDURE IN DETAIL:  After correct site was marked with the patient's   participation in the holding area, the patient was brought to the Operating   Room, placed in supine position, underwent general anesthesia.  His right upper   extremity was prepped and draped in normal sterile fashion.  A well-padded   sterile tourniquet was placed on the right upper extremity.  Timeout was   conducted for the correct site and procedure to be indicated.  IV antibiotics   were given to the patient preoperatively.  The incisions were marked out just   posterior to the medial epicondyle and his original incision was very long and   therefore this would be utilized as well as his original carpal tunnel incision   and the palm was a good length and we extended this across the volar wrist and   into the forearm.  The arm was exsanguinated after the timeout having conducted   and IV antibiotics were given.  The incision was made first posterior to the   medial epicondyle that this was a very lengthy incision that had been made   prior.  There was significant amount of scar tissue and this made the surgery   much more difficult and time consuming because very meticulously the scar had to   be removed.  Under loupe magnification, a complete neurolysis was performed.    Of note, the nerve did appear extremely hyperemic.  Once that was completed, the   nerve was wrapped with AxoGen nerve wrap.  Submuscular transposition was then   conducted and when the fascia of the muscle was sutured back, mosquito hemostat   was placed over the ulnar nerve the entire time to confirm that this did not   violate the ulnar nerve or pinch the ulnar nerve.  Once the fascia was sutured   with FiberWire, again, a Curlew hemostat was passed on the undersurface to    confirm that it was completely decompressed and that it was in good position and   that it was not restricted.  In addition, the elbow was placed through range of   motion showed it did not sublux.  In addition, the nerve was in a straight line   and it was not kinked.  This area was then irrigated with copious amounts of   normal saline.  Amniox patch was sutured into position.  Vicryl, Monocryl,   Dermabond closed the skin.  Our attention was turned to the carpal tunnel.    Incision was made continued volarly across the wrist crease.  Cutaneous nerve   was identified proximally and decompression was conducted.  This was more   significant than a normal decompression because there was significant amount of   scar and a complete neurolysis was conducted under loupe magnification.  This   also added to the complexity to meticulously dissecting out the median nerve   from scar bed.  After that was completed, the median nerve was trapped within   AxoGen nerve wrap.  The area was irrigated with copious amounts of normal   saline.  Clarix patch was sutured into position.  Vicryl and nylon closed the   skin.  Sterile dressing was applied.  Tourniquet was deflated.  Brisk capillary   refill ensued.  The patient was placed in a well-padded long arm posterior   splint, tolerated the procedure well and was brought to recovery area in stable   condition.    POSTOPERATIVE PLAN FOR THIS PATIENT:  He is to keep the dressing clean, dry and   intact.  Sutures are to be removed from the palm at 2 weeks.  The patient will   be required to have therapy and again educating the patient of his condition and   the fact that he had significant interosseous wasting preoperatively.  This   could continue.  His nerve was in poor condition and I did discuss this with the   patient postoperatively.      LES/HN  dd: 01/07/2019 08:32:01 (CST)  td: 01/07/2019 09:55:41 (CST)  Doc ID   #[id number]  Job ID #[id number]    CC:

## 2019-01-07 NOTE — TELEPHONE ENCOUNTER
----- Message from Yuliya Amato LPN sent at 1/4/2019  2:05 PM CST -----  Regarding: FW: PO visit      ----- Message -----  From: JUAN JOSÉ Ware  Sent: 1/4/2019   1:59 PM  To: Linn Gallo OT, Fina CASANOVA Staff  Subject: PO visit                                         Please reschedule PO visit for Thursday or Monday when Linn is in clinic and able to schedule therapy eval and custom orthotic for same day.  Thanks!

## 2019-01-10 ENCOUNTER — PATIENT MESSAGE (OUTPATIENT)
Dept: ORTHOPEDICS | Facility: CLINIC | Age: 49
End: 2019-01-10

## 2019-01-11 NOTE — TELEPHONE ENCOUNTER
Called to advise the patient about concerns that he has about swelling.     Unable to make contact; left a voicemail.

## 2019-01-17 ENCOUNTER — CLINICAL SUPPORT (OUTPATIENT)
Dept: REHABILITATION | Facility: HOSPITAL | Age: 49
End: 2019-01-17
Attending: PHYSICIAN ASSISTANT
Payer: COMMERCIAL

## 2019-01-17 ENCOUNTER — OFFICE VISIT (OUTPATIENT)
Dept: ORTHOPEDICS | Facility: CLINIC | Age: 49
End: 2019-01-17
Payer: COMMERCIAL

## 2019-01-17 VITALS
HEIGHT: 75 IN | HEART RATE: 85 BPM | SYSTOLIC BLOOD PRESSURE: 135 MMHG | DIASTOLIC BLOOD PRESSURE: 89 MMHG | WEIGHT: 234 LBS | BODY MASS INDEX: 29.09 KG/M2

## 2019-01-17 DIAGNOSIS — R29.898 RIGHT HAND WEAKNESS: Primary | ICD-10-CM

## 2019-01-17 DIAGNOSIS — G56.21 ULNAR NERVE ENTRAPMENT AT RIGHT ELBOW: ICD-10-CM

## 2019-01-17 DIAGNOSIS — M62.549 ATROPHY OF INTEROSSEOUS MUSCLE OF HAND: ICD-10-CM

## 2019-01-17 DIAGNOSIS — Z98.890 POST-OPERATIVE STATE: Primary | ICD-10-CM

## 2019-01-17 DIAGNOSIS — G56.01 RIGHT CARPAL TUNNEL SYNDROME: ICD-10-CM

## 2019-01-17 PROCEDURE — 99999 PR PBB SHADOW E&M-EST. PATIENT-LVL III: CPT | Mod: PBBFAC,,, | Performed by: ORTHOPAEDIC SURGERY

## 2019-01-17 PROCEDURE — 97165 OT EVAL LOW COMPLEX 30 MIN: CPT

## 2019-01-17 PROCEDURE — 99024 PR POST-OP FOLLOW-UP VISIT: ICD-10-PCS | Mod: S$GLB,,, | Performed by: ORTHOPAEDIC SURGERY

## 2019-01-17 PROCEDURE — 99024 POSTOP FOLLOW-UP VISIT: CPT | Mod: S$GLB,,, | Performed by: ORTHOPAEDIC SURGERY

## 2019-01-17 PROCEDURE — 99999 PR PBB SHADOW E&M-EST. PATIENT-LVL III: ICD-10-PCS | Mod: PBBFAC,,, | Performed by: ORTHOPAEDIC SURGERY

## 2019-01-17 PROCEDURE — L3763 EWHO RIGID W/O JNTS CF: HCPCS

## 2019-01-17 NOTE — PROGRESS NOTES
"Mr. Lowe is here today for a post-operative visit.  He is 13 days status post Right ulnar nerve decompression at the elbow with neurolysis under loupe magnification, submuscular transposition wrapping as AxoGen nerve wrap with Clarix placement and Right median nerve neurolysis under loupe magnification at the carpal tunnel in addition, wrapping the median nerve with AxoGen nerve wrap and Clarix membrane placement by Dr. Harden on 1/4/19. He reports that he is doing well.  Pain is intermittent.  He is taking pain medication, Norco prn.  He denies fever, chills, and sweats since the time of the surgery.     Physical exam:    Vitals:    01/17/19 0951   BP: 135/89   Pulse: 85   Weight: 106.1 kg (234 lb)   Height: 6' 3" (1.905 m)   PainSc:   7     Vital signs are stable, patient is afebrile.  Patient is well dressed and well groomed, no acute distress.  Alert and oriented to person, place, and time.  Post op dressing taken down.  Incisions are clean, dry and intact.  There is no erythema or exudate.  There is no sign of any infection. He is NVI. Sutures removed without difficulty.      Assessment: status post Right ulnar nerve decompression at the elbow with neurolysis under loupe magnification, submuscular transposition wrapping as AxoGen nerve wrap with Clarix placement and Right median nerve neurolysis under loupe magnification at the carpal tunnel in addition, wrapping the median nerve with AxoGen nerve wrap and Clarix membrane placement by Dr. Harden on 1/4/19    Plan:  Michael was seen today for pain.    Diagnoses and all orders for this visit:    Post-operative state    - PO instruction reviewed and provided to patient  -OT scheduled today for custom splint   -RTC 4 wks       Anna Sullivan PA-C  [facility]   [facility] [place]    "

## 2019-01-21 ENCOUNTER — CLINICAL SUPPORT (OUTPATIENT)
Dept: REHABILITATION | Facility: HOSPITAL | Age: 49
End: 2019-01-21
Payer: COMMERCIAL

## 2019-01-21 DIAGNOSIS — R29.898 RIGHT HAND WEAKNESS: Primary | ICD-10-CM

## 2019-01-21 DIAGNOSIS — M62.549 ATROPHY OF INTEROSSEOUS MUSCLE OF HAND: ICD-10-CM

## 2019-01-21 DIAGNOSIS — G56.21 ULNAR NERVE ENTRAPMENT AT RIGHT ELBOW: ICD-10-CM

## 2019-01-21 DIAGNOSIS — G56.01 RIGHT CARPAL TUNNEL SYNDROME: ICD-10-CM

## 2019-01-21 PROCEDURE — 97140 MANUAL THERAPY 1/> REGIONS: CPT

## 2019-01-21 PROCEDURE — 97110 THERAPEUTIC EXERCISES: CPT

## 2019-01-21 PROCEDURE — 97035 APP MDLTY 1+ULTRASOUND EA 15: CPT

## 2019-01-23 NOTE — PROGRESS NOTES
I have personally taken the history and examined the patient. I agree with the Hand Surgery PA's note. The plan will be OT and bracing. Incisions look great. Start OT. Pt has significant interosseous wasting and discussed this most likely will not return.

## 2019-01-28 ENCOUNTER — CLINICAL SUPPORT (OUTPATIENT)
Dept: REHABILITATION | Facility: HOSPITAL | Age: 49
End: 2019-01-28
Payer: COMMERCIAL

## 2019-01-28 DIAGNOSIS — R29.898 RIGHT HAND WEAKNESS: ICD-10-CM

## 2019-01-28 DIAGNOSIS — M62.549 ATROPHY OF INTEROSSEOUS MUSCLE OF HAND: ICD-10-CM

## 2019-01-28 PROCEDURE — 97035 APP MDLTY 1+ULTRASOUND EA 15: CPT

## 2019-01-28 PROCEDURE — 97140 MANUAL THERAPY 1/> REGIONS: CPT

## 2019-01-28 PROCEDURE — 97110 THERAPEUTIC EXERCISES: CPT

## 2019-02-04 ENCOUNTER — CLINICAL SUPPORT (OUTPATIENT)
Dept: REHABILITATION | Facility: HOSPITAL | Age: 49
End: 2019-02-04
Payer: COMMERCIAL

## 2019-02-04 DIAGNOSIS — R29.898 RIGHT HAND WEAKNESS: Primary | ICD-10-CM

## 2019-02-04 DIAGNOSIS — M62.549 ATROPHY OF INTEROSSEOUS MUSCLE OF HAND: ICD-10-CM

## 2019-02-04 PROCEDURE — 97110 THERAPEUTIC EXERCISES: CPT

## 2019-02-04 PROCEDURE — 97140 MANUAL THERAPY 1/> REGIONS: CPT

## 2019-02-04 PROCEDURE — 97035 APP MDLTY 1+ULTRASOUND EA 15: CPT

## 2019-02-04 NOTE — PROGRESS NOTES
"OT Daily Progress Note    Patient:  Michael Ramirezyassine  [facility] #:  [id number]   Date of Note: 1/21/2019  Referring Physician:  Yoselin Bond PA, Dr. SALVATORE Mendoza   Diagnosis:  R CTR and CT revision 1/4/2019  Encounter Diagnoses   Name Primary?   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 Treatment  consist of the following: Performed paraffin bath x 10 min to increase blood flow, circulation and tissue elasticity prior to therex.  Performed US 3 mhz 0.5 w/cm2 x 8 min x 100% to increase blood flow, circulation, tissue elasticity and for wound/scar management.  Performed scar massage to wrist with massage stick and mini vibrator to decrease adhesions and improve tensile glide.     Performed median nerve glide with assistance to maintain wrist and digit extension x 10 reps to promote flossing thru carpal tunnel.  Added tendon glides with "roll and press" to improve excursion of tendons thru CT x 10 reps.  Added AROM of wrist/elbow with and without gravity assisted plane x 10 reps each. Added ulnar nerve glide positions 1-3 for flossing of ulnar nerve within pain free range x 10 reps.  Instructed and performed desensitization with textured cube x 5 min, rice and various textures x 1-3 min each for nerve re-education and pain management.  Encouraged home desensitization 3-4 x daily and provided textures for home use.     Adjusted elbow brace to decrease pressure on ulnar elbow incision, padded for comfort and fit      Treatment: Paraffin x 10 min, Ultrasound x 8 min, Therapeutic exercises x 15 min and Manual therapy x 12 " min     Assessment:  ROM improving; pain and limitations at endrange.  Progressing slowly with nerve glides and AROM.  No strengthening or weightbearing.   Pt will continue to benefit from skilled OT intervention.   Patient is making good progress toward established goals.   Patient continues to demonstrate limitation  with  ROM, Joint mobility, Stiffness, Decreased fine motor coordination, Decreased gross motor coordination, Decreased functional use, Impaired sensation, Decreased strength and Continued pain     Goals: ( 4 weeks)   1)  Patient to be IND with HEP and modalities for pain management  2)  Decrease complaints of pain/numbness by 30-50% to increase tolerance for ADLS, work activities   3)  Assess  and pinch as able and set goals accordingly.      Plan:   Patient to be treated by Occupational Therapy  1-2 x week for 8 weeks    during the certification period from 1/17/2019 to 3/17/2019   to achieve the established goals.

## 2019-02-04 NOTE — PROGRESS NOTES
"OT Daily Progress Note     Patient:  Michael Ramirezyassine  [facility] #:  [id number]   Date of Note: 2/4/2019  Referring Physician:  Yoselin Bond PA, Dr. SALVATORE Mendoza   Diagnosis:  R CTR and CT revision 1/4/2019       Encounter Diagnoses   Name Primary?   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  I'm not using it, just moving it a little "    Pain: 4-5  out of 10      Objective:   Patient seen by OT this session. Treatment  consist of the following: Performed paraffin bath x 10 min to increase blood flow, circulation and tissue elasticity prior to therex.  Performed US 3 mhz 0.5 w/cm2 x 8 min x 100% to increase blood flow, circulation, tissue elasticity and for wound/scar management.  Performed scar massage to wrist with massage stick and mini vibrator to decrease adhesions and improve tensile glide.      Performed median nerve glide with assistance to maintain wrist and digit extension x 10 reps to promote flossing thru carpal tunnel.  Added tendon glides with "roll and press" to improve excursion of tendons thru CT x 10 reps.  Added AROM of wrist/elbow with and without gravity assisted plane x 10 reps each. Added ulnar nerve glide positions 1-3 for flossing of ulnar nerve within pain free range x 10 reps.  Instructed and performed desensitization with textured cube x 5 min, rice and various textures x 1-3 min each for nerve re-education and pain management.  Encouraged home desensitization 3-4 x daily and provided textures for home use.      Adjusted elbow orthosis to improve comfort and fit        Treatment: Paraffin x 10 min, Ultrasound x 8 min, Therapeutic exercises x 15 min and Manual therapy x 12 min     Assessment:  ROM " improving; pain and limitations at endrange.  Progressing slowly with nerve glides and AROM.  No strengthening or weightbearing.   Pt will continue to benefit from skilled OT intervention.   Patient is making good progress toward established goals.   Patient continues to demonstrate limitation  with  ROM, Joint mobility, Stiffness, Decreased fine motor coordination, Decreased gross motor coordination, Decreased functional use, Impaired sensation, Decreased strength and Continued pain      Goals: ( 4 weeks)   1)  Patient to be IND with HEP and modalities for pain management  2)  Decrease complaints of pain/numbness by 30-50% to increase tolerance for ADLS, work activities   3)  Assess  and pinch as able and set goals accordingly.      Plan:   Patient to be treated by Occupational Therapy  1-2 x week for 8 weeks    during the certification period from 1/17/2019 to 3/17/2019   to achieve the established goals.

## 2019-02-04 NOTE — PROGRESS NOTES
"Occupational Therapy -Hand / Wrist  Evaluation     Patient: Michael Lowe  Date of Evaluation: 2019  Referring Physician:  Dr. Austin Mendoza   Diagnosis: R CTR and CT revision 2019  1. Ulnar neuropathy at wrist, right    2. Median neuropathy, right    3. Distal muscle weakness    4. Fine motor impairment     Decreased  strength, Right     MRN: [id number]     Referral Orders:   Eval and treat      Start Time: 1115  End Time: 12  Total Time: 45     Hand dominance: Right     Occupation:  Self-Employed, [employer]ates [employer]   Working presently:  not working presently   Workmen's Compensation:  no     Date of onset: 10/23/13 ; Revision 2019   Involved area:  R hand/elbow   Mechanism of Injury:   R CTS and ulnar nerve decompression on 10/23/13; Revision 2019  Past Medical History/Physical Systems Review: CAD; ulnar/median neuropathy      Environmental Concerns/ Fall Risk:  None  Barriers to Learning: None   Cultural/Spiritual : None   Developmental/Education: None   Abuse/ Neglect: none   Nutritional Deficit: None   Language: None   Hearing/Vision Deficit: None   Other:      Subjective:  Patient was treated previously as a patient for this condition from 2014 to 2015 .  He returns to clinic today following carpal tunnel and cubital tunnel revision on 2019     Pt reports It's still pretty numb in my ring and small finger, the elbow is sensitive,and its weak, of course "      Pain   At rest: 0 out of 10  With work/ Activity: 6-7 out of 10  Sleepin out of 10  Location of Pain : elbow incision sensitive to touch, finger pain, sometimes wake up with pain      Patients goals for therapy are:  I'd like to get my hand working better, I need more strength in my hand; less numbness and more strength     Objective:   Observation  :atrophy web space, intrinsic muscle weakness, generalized finger stiffness      Sensation: numbness in ulnar ring and small finger.   Scar / Wound: 6 cm " volar wrist incision and 12 cm post elbow incision, both healing well, hypersensitive to touch, numbness in ulnar small / ring finger  Edema:  None         Range of Motion:   WNL except as noted:   Opposition to ring finger   Grossly 70* IP flexion all digits  Wrist ext/flex 30/30   Elbow ROM WFL's.                                              Manual Muscle Test:  FPL 3/5  FPB 3/5  APL 3-/5  FDM 3-/5  ODM 3-/5  OP 3-/5  ADM 3-/5  DAB/PAD 2/5  lumb 2+/5     Atrophy of 1st web space, minimal ape/claw hand secondary to ulnar/median palsy and intrinsic weakness                                       Strength: (ROXANN Dynamometer in lbs.), Average 3 trials,           TBA      Pinch Strength: (Pinch Gauge in psis), Average 3 trials               TBA      Functional Limitations:   Self Care / ADL: Limited use of R hand for ADLs, grooming, hygiene, gripping/pinching   Work/Activities: Limited use of R hand  for gripping, pinching, hand weakness, holding tools  Leisure: Limited use of R hand  For tool use, pinching, gripping , driving      Treatment Included:   OT evaluation and reviewed  HEP including  hook, wave, roll and passive positioning of fingers into flat fist to press (tendon glide) x 10 reps each. Ulnar nerve glide, positions 1-3 in limited range; median nerve glide with assistance for wrist/digit extension x 10 reps each.  Instructed in desensitization   With 4 textures and rice x 1-3 min each for nerve re-education and pain management.  Fabricated static elbow and hand gutter orthosis () with aquaplast to support elbow at 90* and wrist in neutral.  Instructed to wear 24/7 with removal for HEP, hygiene.  Padded with velfoam and padding to improve comfort and fit.  Patient reported good understanding of same.                  Assessment:   Problem List : R hand       Decreased ROM   Decreased  and pinch strength --TBA   Decreased muscle strength   Decreased functional hand use   Decreased coordination    Ulnar/median palsy   Scar adhesions      Goals: ( 4 weeks)   1)  Patient to be IND with HEP and modalities for pain management  2)  Decrease complaints of pain/numbness by 30-50% to increase tolerance for ADLS, work activities   3)  Assess  and pinch as able and set goals accordingly.      Plan:   Patient to be treated by Occupational Therapy  1-2 x week for 8 weeks    during the certification period from 1/17/2019 to 3/17/2019   to achieve the established goals.    Treatment to include :  paraffin, fluidotherapy, manual therapy/joint mobilizations, orthotic fabrication/fitting/training, kinesiotaping, e-stim, modalities for pain management, US 3mhz, therapeutic exercises/activities,        strengthening,    scar and edema management, as well as any other treatments deemed necessary based on the patient's needs or progress.                I certify the need for these services furnished under this plan of treatment and while under my care     ____________________________________                         __________________  Physician/Referring Practitioner                                               Date of Signature

## 2019-02-11 ENCOUNTER — CLINICAL SUPPORT (OUTPATIENT)
Dept: REHABILITATION | Facility: HOSPITAL | Age: 49
End: 2019-02-11
Payer: COMMERCIAL

## 2019-02-11 DIAGNOSIS — R29.898 RIGHT HAND WEAKNESS: ICD-10-CM

## 2019-02-11 DIAGNOSIS — G56.01 RIGHT CARPAL TUNNEL SYNDROME: ICD-10-CM

## 2019-02-11 DIAGNOSIS — G56.21 ULNAR NERVE ENTRAPMENT AT RIGHT ELBOW: ICD-10-CM

## 2019-02-11 DIAGNOSIS — M62.549 ATROPHY OF INTEROSSEOUS MUSCLE OF HAND: Primary | ICD-10-CM

## 2019-02-11 PROCEDURE — 97035 APP MDLTY 1+ULTRASOUND EA 15: CPT

## 2019-02-11 PROCEDURE — 97140 MANUAL THERAPY 1/> REGIONS: CPT

## 2019-02-11 PROCEDURE — 97110 THERAPEUTIC EXERCISES: CPT

## 2019-02-11 NOTE — PROGRESS NOTES
"OT Daily Progress Note    [facility]  [facility]  [facility] #:  [id number]   Date of Note: 02/11/2019   Referring Physician:  Yoselin Bond PA  Diagnosis:  R CTR and CT revision 1/4/2019  Encounter Diagnoses   Name Primary?   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 Treatment  consist of the following: Performed paraffin bath x 10 min to increase blood flow, circulation and tissue elasticity prior to therex.  Performed US 3 mhz 0.5 w/cm2 x 8 min x 100% to  Right wrist to increase blood flow, circulation, tissue elasticity and for wound/scar management.  Performed scar massage with mini vibrator, massage stick to decrease adhesions and improve tensile glide.      Performed median nerve glide x 10 reps, UN glides positions 1-3 post surgical for flossing ulnar nerve within pain free range,  Added tendon glides including roll and press for improving glide thru Carpal tunnel x 10 reps.     Added desensitization with various textures and rice  x 1-2 min each over ulnar/median nerve distribution for nerve re-education and pain management.  Adjusted elbow orthosis for comfort and fit.       Treatment: Paraffin x 10 min, Ultrasound x 8 min, Therapeutic exercises x 15 min and Manual therapy x 12 min     Assessment:  ROM improving, continued numbness in ulnar aspect of digits.  No weightbearing or strengthening at this time, awaiting MD clearance.  Pt will continue to benefit from skilled OT intervention.   Patient is making good progress toward established " goals.   Patient continues to demonstrate limitation  with  Stiffness, Decreased gross motor coordination, Decreased functional use, Impaired sensation, Decreased strength and Continued pain    Goals: ( 4 weeks)   1)  Patient to be IND with HEP and modalities for pain management  2)  Decrease complaints of pain/numbness by 30-50% to increase tolerance for ADLS, work activities   3)  Assess  and pinch as able and set goals accordingly.      Plan:   Patient to be treated by Occupational Therapy  1-2 x week for 8 weeks    during the certification period from 1/17/2019 to 3/17/2019   to achieve the established goals.

## 2019-02-18 ENCOUNTER — OFFICE VISIT (OUTPATIENT)
Dept: ORTHOPEDICS | Facility: CLINIC | Age: 49
End: 2019-02-18
Payer: COMMERCIAL

## 2019-02-18 ENCOUNTER — CLINICAL SUPPORT (OUTPATIENT)
Dept: REHABILITATION | Facility: HOSPITAL | Age: 49
End: 2019-02-18
Payer: COMMERCIAL

## 2019-02-18 VITALS
SYSTOLIC BLOOD PRESSURE: 146 MMHG | HEART RATE: 73 BPM | WEIGHT: 233.94 LBS | DIASTOLIC BLOOD PRESSURE: 93 MMHG | HEIGHT: 75 IN | BODY MASS INDEX: 29.09 KG/M2

## 2019-02-18 DIAGNOSIS — R29.898 RIGHT HAND WEAKNESS: ICD-10-CM

## 2019-02-18 DIAGNOSIS — M62.549 ATROPHY OF INTEROSSEOUS MUSCLE OF HAND: ICD-10-CM

## 2019-02-18 DIAGNOSIS — Z98.890 POST-OPERATIVE STATE: Primary | ICD-10-CM

## 2019-02-18 PROCEDURE — 99999 PR PBB SHADOW E&M-EST. PATIENT-LVL III: ICD-10-PCS | Mod: PBBFAC,,, | Performed by: PHYSICIAN ASSISTANT

## 2019-02-18 PROCEDURE — 99024 POSTOP FOLLOW-UP VISIT: CPT | Mod: S$GLB,,, | Performed by: PHYSICIAN ASSISTANT

## 2019-02-18 PROCEDURE — 97110 THERAPEUTIC EXERCISES: CPT

## 2019-02-18 PROCEDURE — 99999 PR PBB SHADOW E&M-EST. PATIENT-LVL III: CPT | Mod: PBBFAC,,, | Performed by: PHYSICIAN ASSISTANT

## 2019-02-18 PROCEDURE — 97018 PARAFFIN BATH THERAPY: CPT

## 2019-02-18 PROCEDURE — 97140 MANUAL THERAPY 1/> REGIONS: CPT

## 2019-02-18 PROCEDURE — 99024 PR POST-OP FOLLOW-UP VISIT: ICD-10-PCS | Mod: S$GLB,,, | Performed by: PHYSICIAN ASSISTANT

## 2019-02-18 NOTE — PROGRESS NOTES
"Mr. Lowe is here today for a post-operative visit.  He is 6 weeks status post Right ulnar nerve decompression at the elbow with neurolysis under loupe magnification, submuscular transposition wrapping as AxoGen nerve wrap with Clarix placement and Right median nerve neurolysis under loupe magnification at the carpal tunnel in addition, wrapping the median nerve with AxoGen nerve wrap and Clarix membrane placement by Dr. Harden on 1/4/19. He reports that he is doing well. He has been attending therapy and doing exercises as instructed. He has begun to wean out of the brace. He denies pain. He notes minimal numbness in the volar small finger, less so in the ring finger. Denies dorsal numbness.  He denies fever, chills, and sweats since the time of the surgery.     Physical exam:    Vitals:    02/18/19 0835   BP: (!) 146/93   Pulse: 73   Weight: 106.1 kg (233 lb 14.5 oz)   Height: 6' 3" (1.905 m)   PainSc:   2     Vital signs are stable, patient is afebrile.  Patient is well dressed and well groomed, no acute distress.  Alert and oriented to person, place, and time.  Incisions are clean, dry and intact, well healed.  There is no erythema or exudate.  There is no sign of any infection. He is NVI. Good elbow, wrist, and finger motion.     Assessment: status post Right ulnar nerve decompression at the elbow with neurolysis under loupe magnification, submuscular transposition wrapping as AxoGen nerve wrap with Clarix placement and Right median nerve neurolysis under loupe magnification at the carpal tunnel in addition, wrapping the median nerve with AxoGen nerve wrap and Clarix membrane placement by Dr. Harden on 1/4/19    Plan:  Michael was seen today for post-op evaluation.    Diagnoses and all orders for this visit:    Post-operative state    - PO instruction reviewed and provided to patient  -continue OT  -RTC 6 wks       Anna Sullivan PA-C  [facility]   [facility] [place]    "

## 2019-02-18 NOTE — PATIENT INSTRUCTIONS
Strengthening (Resistive Putty)        Squeeze putty using thumb and all fingers.  Repeat 20 times. Do 3-5 sessions per day.       Flexion (Resistive Putty)        Keeping fingertips straight, press putty towards base of palm. Try to keep fingers straight when gripping.  Repeat 20 times. Do 2-3 sessions per day.       Thumb Adduction      Put the putty in the thumb web area. Squeeze your thumb to your palm.  Repeat 15 times. Do 2-3 sessions per day.    Adduction (Resistive Putty)    Press between fingers and pull putty anchored with other hand. Repeat with all fingers.   Repeat 10 times. Do 2-3 sessions per day.      Adduction with Putty      Place the putty between two of your fingers, now squeeze into the putty trying to squeeze those two fingers together.   Repeat 10 times. Do 2-3 sessions per day.

## 2019-02-18 NOTE — PROGRESS NOTES
"OT Daily Progress Note    [facility] #:  [id number]   Date of Note: 02/18/2019   Referring Physician:  Yoselin Bond PA  Diagnosis:  R CTR and CT revision 1/4/2019  Encounter Diagnoses   Name Primary?    Atrophy of interosseous muscle of hand     Right hand weakness         Visit 5 of 20, expires 12/31/2019  NO FOTO    Start Time: 9am  End Time: 945am  Total Time: 45 min  Group Time: 0      Subjective:   "The PA said I can start weaning out of this brace, I am so over this brace. I did notice some of my feeling is coming back, overall I am feeling better "   Pain: 0 out of 10     Objective:   Patient seen by OT this session. Treatment  consist of the following:     -Performed paraffin bath x 10 min to increase blood flow, circulation and tissue elasticity prior to therex.    - Performed scar massage to wrist area for 10 minutes to decrease adhesions and improve tensile glide.   -Performed ulnar nerve innervated intrinsic strengthening via yellow putty: lumbrical pull with putty tool, adductor pollicis resistance with tactile cues to avoid compensation via FPL, gross , lumbrical    -Performed ulnar nerve innervate intrinsic motion utilizing velcro pads for increased mobility for hand abd/adduction  - Added strengthening to HEP    Treatment: Paraffin x 10 min, Manual x 10 min, TE x 35 min      Assessment:  Pt will continue to benefit from skilled OT intervention.   Patient is making good progress toward established goals. Severe compensation with all attempted intrinsic strengthening today. Requires verbal and tactile cues to avoid. Pt reported no pain but increased fatigue at end of session today. Will continue to progress as tolerate.d   Patient continues to demonstrate limitation  with  Stiffness, Decreased gross motor coordination, Decreased functional use, Impaired sensation, Decreased strength and Continued pain    Goals: ( 4 weeks)   1)  Patient to be IND with HEP and " modalities for pain management - met 2/18/19  2)  Decrease complaints of pain/numbness by 30-50% to increase tolerance for ADLS, work activities   3)  Assess  and pinch as able and set goals accordingly.      Plan:   Patient to be treated by Occupational Therapy  1-2 x week for 8 weeks    during the certification period from 1/17/2019 to 3/17/2019   to achieve the established goals.  Take  measurements

## 2019-02-27 ENCOUNTER — CLINICAL SUPPORT (OUTPATIENT)
Dept: REHABILITATION | Facility: HOSPITAL | Age: 49
End: 2019-02-27
Payer: COMMERCIAL

## 2019-02-27 DIAGNOSIS — R29.898 RIGHT HAND WEAKNESS: ICD-10-CM

## 2019-02-27 DIAGNOSIS — G56.21 ULNAR NERVE ENTRAPMENT AT RIGHT ELBOW: ICD-10-CM

## 2019-02-27 DIAGNOSIS — G56.01 RIGHT CARPAL TUNNEL SYNDROME: ICD-10-CM

## 2019-02-27 DIAGNOSIS — M62.549 ATROPHY OF INTEROSSEOUS MUSCLE OF HAND: ICD-10-CM

## 2019-02-27 PROCEDURE — 97018 PARAFFIN BATH THERAPY: CPT | Mod: 59

## 2019-02-27 PROCEDURE — 97110 THERAPEUTIC EXERCISES: CPT

## 2019-02-27 NOTE — PROGRESS NOTES
"OT Daily Progress Note    Patient:  Michael Lowe  [facility] #:  [id number]   Date of Note: 02/27/2019   Referring Physician:  Yoselin Bond PA; Dr. DENI Mendoza   Diagnosis:  R CTR and CT revision 1/4/2019  Encounter Diagnoses   Name Primary?    Atrophy of interosseous muscle of hand     Right hand weakness         Visit 7 of 20, expires 12/31/2019  NO FOTO    Start Time: 1030  End Time: 1115  Total Time: 45 min  Group Time: 0      Subjective:   "I can only do about 1 set of all the exercises the day and I'm worn out.  I think the numbness is getting less or I'm getting use to it"   Pain: 0 out of 10     Objective:   Patient seen by OT this session. Treatment  consist of the following:     - Performed paraffin bath x 10 min to increase blood flow, circulation and tissue elasticity prior to therex.    - Performed scar massage to wrist area for 10 minutes to decrease adhesions and improve tensile glide.   -  -  Added strengthening to HEP    Upgraded to 3 # for elbow flex/ext (3 positions) and wrist flex/ext/RD/UD (3 positions ) x 10 reps each.   1# sup/pron x 10 reps     Light yellow rubberband for digit abduction with "place and hold" to maintain position x 3 sec hold x 10 reps.   Yellow putty for gross sustained , key, 2 and 3 pt pinch , Attempted umbrical squeeze with min success , and performed  thumb adduction x 10 reps.  Encouraged gradual addition of intrinsic exercises to HEP as tolerated due to fatigue.  No fatigue or pain with resistive wrist/elbow strengthening reported.       Treatment: Paraffin x 10 min, Manual x 10 min, TE x 35 min      Assessment:  Pt will continue to benefit from skilled OT intervention.   Patient is making good progress toward established goals.  Progressing with UE strengthening.  Intrinsic hand strengthening remains limited due to nerve damage and significant fatigue noted upon completion of /pinch and intrinsic strengthening.  Decrease complaints of hand numbness " this day.  Patient continues to demonstrate limitation  with  Stiffness, Decreased gross motor coordination, Decreased functional use, Impaired sensation, Decreased strength and Continued pain    Goals: ( 4 weeks)   1)  Patient to be IND with HEP and modalities for pain management - met 2/18/19  2)  Decrease complaints of pain/numbness by 30-50% to increase tolerance for ADLS, work activities   3)  Assess  and pinch as able and set goals accordingly.      Plan:   Patient to be treated by Occupational Therapy  1-2 x week for 8 weeks    during the certification period from 1/17/2019 to 3/17/2019   to achieve the established goals.  Take  measurements

## 2019-02-27 NOTE — PROGRESS NOTES
"OT Daily Progress Note     [facility] #:  [id number]   Date of Note: 1/28/2019  Referring Physician:  Yoselin Bond PA, Dr. SALVATORE Mendoza   Diagnosis:  R CTR and CT revision 1/4/2019       Encounter Diagnoses   Name Primary?   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 Treatment  consist of the following: Performed paraffin bath x 10 min to increase blood flow, circulation and tissue elasticity prior to therex.  Performed US 3 mhz 0.5 w/cm2 x 8 min x 100% to increase blood flow, circulation, tissue elasticity and for wound/scar management.  Performed scar massage to wrist with massage stick and mini vibrator to decrease adhesions and improve tensile glide.      Performed median nerve glide with assistance to maintain wrist and digit extension x 10 reps to promote flossing thru carpal tunnel.  Added tendon glides with "roll and press" to improve excursion of tendons thru CT x 10 reps.  Added AROM of wrist/elbow with and without gravity assisted plane x 10 reps each. Added ulnar nerve glide positions 1-3 for flossing of ulnar nerve within pain free range x 10 reps.  Instructed and performed desensitization with textured cube x 5 min, rice and various textures x 1-3 min each for nerve re-education and pain management.  Encouraged home desensitization 3-4 x daily and provided textures for home use.      Adjusted elbow brace to decrease pressure on ulnar elbow incision, padded for comfort and fit        Treatment: Paraffin x 10 min, Ultrasound x 8 min, " Therapeutic exercises x 15 min and Manual therapy x 12 min     Assessment:  ROM improving; pain and limitations at endrange.  Progressing slowly with nerve glides and AROM.  No strengthening or weightbearing.   Pt will continue to benefit from skilled OT intervention.   Patient is making good progress toward established goals.   Patient continues to demonstrate limitation  with  ROM, Joint mobility, Stiffness, Decreased fine motor coordination, Decreased gross motor coordination, Decreased functional use, Impaired sensation, Decreased strength and Continued pain      Goals: ( 4 weeks)   1)  Patient to be IND with HEP and modalities for pain management  2)  Decrease complaints of pain/numbness by 30-50% to increase tolerance for ADLS, work activities   3)  Assess  and pinch as able and set goals accordingly.      Plan:   Patient to be treated by Occupational Therapy  1-2 x week for 8 weeks    during the certification period from 1/17/2019 to 3/17/2019   to achieve the established goals.

## 2019-03-07 ENCOUNTER — CLINICAL SUPPORT (OUTPATIENT)
Dept: REHABILITATION | Facility: HOSPITAL | Age: 49
End: 2019-03-07
Payer: COMMERCIAL

## 2019-03-07 DIAGNOSIS — M62.549 ATROPHY OF INTEROSSEOUS MUSCLE OF HAND: ICD-10-CM

## 2019-03-07 DIAGNOSIS — R29.898 RIGHT HAND WEAKNESS: ICD-10-CM

## 2019-03-07 DIAGNOSIS — G56.01 RIGHT CARPAL TUNNEL SYNDROME: Primary | ICD-10-CM

## 2019-03-07 PROCEDURE — 97140 MANUAL THERAPY 1/> REGIONS: CPT

## 2019-03-07 PROCEDURE — 97110 THERAPEUTIC EXERCISES: CPT

## 2019-03-07 PROCEDURE — 97018 PARAFFIN BATH THERAPY: CPT | Mod: 59

## 2019-03-07 NOTE — PROGRESS NOTES
"OT Daily Progress Note    Patient:  Michael Lowe  [facility] #:  [id number]   Date of Note: 03/07/2019   Referring Physician:  Yoselin Bond PA; Dr. DENI Mendoza   Diagnosis:  R CTR and CT revision 1/4/2019  Encounter Diagnoses   Name Primary?    Atrophy of interosseous muscle of hand     Right hand weakness     Right carpal tunnel syndrome Yes        Visit 8 of 20, expires 12/31/2019  NO FOTO    Start Time: 915  End Time: 10  Total Time: 45 min  Group Time: 0      Subjective:   Re-assess next visit , assess  and update POC.   "I think its just going to take time, it is getting better, just slow, maybe I should just come every few weeks"   Pain: 0 out of 10     Objective:   Patient seen by OT this session. Treatment  consist of the following:     - Performed paraffin bath x 10 min to increase blood flow, circulation and tissue elasticity prior to therex.    - Performed scar massage to wrist area for 10 minutes to decrease adhesions and improve tensile glide.   -  -  Added strengthening to HEP    Upgraded to 3 # for elbow flex/ext (3 positions) and wrist flex/ext/RD/UD (3 positions ) x 10 reps each.   1# hammer for  sup/pron x 10 reps     Light yellow rubberband for digit abduction with "place and hold" to maintain position x 3 sec hold x 10 reps.   Yellow putty for gross sustained , key, 2 and 3 pt pinch , Attempted lumbrical squeeze with min success , and performed  thumb adduction x 10 reps.  Added 37 lbs PHG for  strength x 15 reps.   Encouraged gradual addition of intrinsic exercises to HEP as tolerated due to fatigue.  No fatigue or pain with resistive wrist/elbow strengthening reported.        Performed median nerve glide x 10 reps, UN glides positions 1-3 post surgical for flossing ulnar nerve within pain free range,  Added tendon glides including roll and press for improving glide thru Carpal tunnel x 10 reps. Added desensitization with various textures and rice  x 1-2 min each over " ulnar/median nerve distribution for nerve re-education and pain management. Discontinued elbow brace     Treatment: Paraffin x 10 min, Manual x 10 min, TE x 35 min      Assessment:  Pt will continue to benefit from skilled OT intervention.   Patient is making good progress toward established goals.  Progressing with UE strengthening.  Intrinsic hand strengthening remains limited due to nerve damage and significant fatigue noted upon completion of /pinch and intrinsic strengthening.  Decrease complaints of hand numbness this day.  Patient continues to demonstrate limitation  with  Stiffness, Decreased gross motor coordination, Decreased functional use, Impaired sensation, Decreased strength and Continued pain.  Will decrease to 1x every 2-3 weeks to progress with strengthening as nerve continues to heal    Goals: ( 4 weeks)   1)  Patient to be IND with HEP and modalities for pain management - met 2/18/19  2)  Decrease complaints of pain/numbness by 30-50% to increase tolerance for ADLS, work activities   3)  Assess  and pinch as able and set goals accordingly.      Plan:   Patient to be treated by Occupational Therapy  1-2 x week for 8 weeks    during the certification period from 1/17/2019 to 3/17/2019   to achieve the established goals.

## 2019-04-10 ENCOUNTER — OFFICE VISIT (OUTPATIENT)
Dept: ORTHOPEDICS | Facility: CLINIC | Age: 49
End: 2019-04-10
Payer: COMMERCIAL

## 2019-04-10 VITALS
DIASTOLIC BLOOD PRESSURE: 83 MMHG | WEIGHT: 233.94 LBS | HEART RATE: 73 BPM | HEIGHT: 75 IN | SYSTOLIC BLOOD PRESSURE: 133 MMHG | BODY MASS INDEX: 29.09 KG/M2

## 2019-04-10 DIAGNOSIS — G56.01 RIGHT CARPAL TUNNEL SYNDROME: ICD-10-CM

## 2019-04-10 DIAGNOSIS — G56.21 ULNAR NERVE ENTRAPMENT AT RIGHT ELBOW: Primary | ICD-10-CM

## 2019-04-10 DIAGNOSIS — Z98.890 POST-OPERATIVE STATE: ICD-10-CM

## 2019-04-10 PROCEDURE — 99024 PR POST-OP FOLLOW-UP VISIT: ICD-10-PCS | Mod: S$GLB,,, | Performed by: PHYSICIAN ASSISTANT

## 2019-04-10 PROCEDURE — 99024 POSTOP FOLLOW-UP VISIT: CPT | Mod: S$GLB,,, | Performed by: PHYSICIAN ASSISTANT

## 2019-04-10 PROCEDURE — 99999 PR PBB SHADOW E&M-EST. PATIENT-LVL III: CPT | Mod: PBBFAC,,, | Performed by: PHYSICIAN ASSISTANT

## 2019-04-10 PROCEDURE — 99999 PR PBB SHADOW E&M-EST. PATIENT-LVL III: ICD-10-PCS | Mod: PBBFAC,,, | Performed by: PHYSICIAN ASSISTANT

## 2019-04-10 NOTE — PROGRESS NOTES
"Mr. Lowe is here today for a post-operative visit.  He is 3 mos status post Right ulnar nerve decompression at the elbow with neurolysis under loupe magnification, submuscular transposition wrapping as AxoGen nerve wrap with Clarix placement and Right median nerve neurolysis under loupe magnification at the carpal tunnel in addition, wrapping the median nerve with AxoGen nerve wrap and Clarix membrane placement by Dr. Harden on 1/4/19. He reports that he is doing well. He has been attending therapy and doing home exercises. Plans to attend therapy every few weeks to continue working on strength. He reports he is using the hand a lot more. He continues to note gradual improvement in  strength, intrinsics remain weak/ stable. Overall function is improved. He denies pain. He has minimal numbness on the volar small, slowly improving.  He denies fever, chills, and sweats since the time of the surgery.     Physical exam:    Vitals:    04/10/19 0844   BP: 133/83   Pulse: 73   Weight: 106.1 kg (233 lb 14.5 oz)   Height: 6' 3" (1.905 m)   PainSc: 0-No pain     Vital signs are stable, patient is afebrile.  Patient is well dressed and well groomed, no acute distress.  Alert and oriented to person, place, and time.  Incisions are clean, dry and intact, well healed.  There is no erythema or exudate.  There is no sign of any infection. He is NVI. Good elbow, wrist, and finger motion.     Assessment: status post Right ulnar nerve decompression at the elbow with neurolysis under loupe magnification, submuscular transposition wrapping as AxoGen nerve wrap with Clarix placement and Right median nerve neurolysis under loupe magnification at the carpal tunnel in addition, wrapping the median nerve with AxoGen nerve wrap and Clarix membrane placement by Dr. Harden on 1/4/19    Plan:  Michael was seen today for post-op evaluation and post-op evaluation.    Diagnoses and all orders for this visit:    Ulnar nerve entrapment " at right elbow    Right carpal tunnel syndrome    Post-operative state     - PO instruction reviewed and provided to patient  -continue OT/ HEP  -RTC as needed      Anna Sullivan PA-C  [facility]   [facility]  [place]

## 2019-05-23 ENCOUNTER — OFFICE VISIT (OUTPATIENT)
Dept: INTERNAL MEDICINE | Facility: CLINIC | Age: 49
End: 2019-05-23
Attending: INTERNAL MEDICINE
Payer: COMMERCIAL

## 2019-05-23 ENCOUNTER — LAB VISIT (OUTPATIENT)
Dept: LAB | Facility: OTHER | Age: 49
End: 2019-05-23
Attending: INTERNAL MEDICINE
Payer: COMMERCIAL

## 2019-05-23 VITALS
DIASTOLIC BLOOD PRESSURE: 70 MMHG | BODY MASS INDEX: 28.83 KG/M2 | HEART RATE: 68 BPM | WEIGHT: 236.75 LBS | HEIGHT: 76 IN | SYSTOLIC BLOOD PRESSURE: 110 MMHG

## 2019-05-23 DIAGNOSIS — L98.9 SKIN ABNORMALITY: ICD-10-CM

## 2019-05-23 DIAGNOSIS — R11.0 NAUSEA: ICD-10-CM

## 2019-05-23 DIAGNOSIS — Z12.5 PROSTATE CANCER SCREENING: ICD-10-CM

## 2019-05-23 DIAGNOSIS — Z00.00 ANNUAL PHYSICAL EXAM: ICD-10-CM

## 2019-05-23 DIAGNOSIS — Z00.00 ANNUAL PHYSICAL EXAM: Primary | ICD-10-CM

## 2019-05-23 LAB
ALBUMIN SERPL BCP-MCNC: 4.4 G/DL (ref 3.5–5.2)
ALP SERPL-CCNC: 84 U/L (ref 55–135)
ALT SERPL W/O P-5'-P-CCNC: 59 U/L (ref 10–44)
ANION GAP SERPL CALC-SCNC: 11 MMOL/L (ref 8–16)
AST SERPL-CCNC: 36 U/L (ref 10–40)
BASOPHILS # BLD AUTO: 0.02 K/UL (ref 0–0.2)
BASOPHILS NFR BLD: 0.4 % (ref 0–1.9)
BILIRUB SERPL-MCNC: 0.7 MG/DL (ref 0.1–1)
BUN SERPL-MCNC: 12 MG/DL (ref 6–20)
CALCIUM SERPL-MCNC: 9.5 MG/DL (ref 8.7–10.5)
CHLORIDE SERPL-SCNC: 105 MMOL/L (ref 95–110)
CHOLEST SERPL-MCNC: 123 MG/DL (ref 120–199)
CHOLEST/HDLC SERPL: 4.2 {RATIO} (ref 2–5)
CO2 SERPL-SCNC: 24 MMOL/L (ref 23–29)
COMPLEXED PSA SERPL-MCNC: 0.72 NG/ML (ref 0–4)
CREAT SERPL-MCNC: 0.8 MG/DL (ref 0.5–1.4)
DIFFERENTIAL METHOD: ABNORMAL
EOSINOPHIL # BLD AUTO: 0.1 K/UL (ref 0–0.5)
EOSINOPHIL NFR BLD: 0.9 % (ref 0–8)
ERYTHROCYTE [DISTWIDTH] IN BLOOD BY AUTOMATED COUNT: 12.9 % (ref 11.5–14.5)
EST. GFR  (AFRICAN AMERICAN): >60 ML/MIN/1.73 M^2
EST. GFR  (NON AFRICAN AMERICAN): >60 ML/MIN/1.73 M^2
ESTIMATED AVG GLUCOSE: 120 MG/DL (ref 68–131)
GLUCOSE SERPL-MCNC: 104 MG/DL (ref 70–110)
HBA1C MFR BLD HPLC: 5.8 % (ref 4–5.6)
HCT VFR BLD AUTO: 44.6 % (ref 40–54)
HDLC SERPL-MCNC: 29 MG/DL (ref 40–75)
HDLC SERPL: 23.6 % (ref 20–50)
HGB BLD-MCNC: 14.6 G/DL (ref 14–18)
LDLC SERPL CALC-MCNC: 80 MG/DL (ref 63–159)
LYMPHOCYTES # BLD AUTO: 1.5 K/UL (ref 1–4.8)
LYMPHOCYTES NFR BLD: 28.3 % (ref 18–48)
MCH RBC QN AUTO: 31.9 PG (ref 27–31)
MCHC RBC AUTO-ENTMCNC: 32.7 G/DL (ref 32–36)
MCV RBC AUTO: 98 FL (ref 82–98)
MONOCYTES # BLD AUTO: 0.5 K/UL (ref 0.3–1)
MONOCYTES NFR BLD: 10 % (ref 4–15)
NEUTROPHILS # BLD AUTO: 3.3 K/UL (ref 1.8–7.7)
NEUTROPHILS NFR BLD: 60.2 % (ref 38–73)
NONHDLC SERPL-MCNC: 94 MG/DL
PLATELET # BLD AUTO: 317 K/UL (ref 150–350)
PMV BLD AUTO: 9.3 FL (ref 9.2–12.9)
POTASSIUM SERPL-SCNC: 4.1 MMOL/L (ref 3.5–5.1)
PROT SERPL-MCNC: 7.3 G/DL (ref 6–8.4)
RBC # BLD AUTO: 4.57 M/UL (ref 4.6–6.2)
SODIUM SERPL-SCNC: 140 MMOL/L (ref 136–145)
TRIGL SERPL-MCNC: 70 MG/DL (ref 30–150)
TSH SERPL DL<=0.005 MIU/L-ACNC: 1.15 UIU/ML (ref 0.4–4)
WBC # BLD AUTO: 5.4 K/UL (ref 3.9–12.7)

## 2019-05-23 PROCEDURE — 84443 ASSAY THYROID STIM HORMONE: CPT

## 2019-05-23 PROCEDURE — 84153 ASSAY OF PSA TOTAL: CPT

## 2019-05-23 PROCEDURE — 99999 PR PBB SHADOW E&M-EST. PATIENT-LVL III: CPT | Mod: PBBFAC,,, | Performed by: INTERNAL MEDICINE

## 2019-05-23 PROCEDURE — 85025 COMPLETE CBC W/AUTO DIFF WBC: CPT

## 2019-05-23 PROCEDURE — 80053 COMPREHEN METABOLIC PANEL: CPT

## 2019-05-23 PROCEDURE — 99999 PR PBB SHADOW E&M-EST. PATIENT-LVL III: ICD-10-PCS | Mod: PBBFAC,,, | Performed by: INTERNAL MEDICINE

## 2019-05-23 PROCEDURE — 83036 HEMOGLOBIN GLYCOSYLATED A1C: CPT

## 2019-05-23 PROCEDURE — 99396 PR PREVENTIVE VISIT,EST,40-64: ICD-10-PCS | Mod: S$GLB,,, | Performed by: INTERNAL MEDICINE

## 2019-05-23 PROCEDURE — 80061 LIPID PANEL: CPT

## 2019-05-23 PROCEDURE — 36415 COLL VENOUS BLD VENIPUNCTURE: CPT

## 2019-05-23 PROCEDURE — 99396 PREV VISIT EST AGE 40-64: CPT | Mod: S$GLB,,, | Performed by: INTERNAL MEDICINE

## 2019-05-23 NOTE — PROGRESS NOTES
"Subjective:       Patient ID: Michael Lowe is a 48 y.o. male.    Chief Complaint: Annual Exam    Here for annual visit    Occasional nausea in the morning, 1-2 times a week for the past 1-2 months. Increase in belching and flatulence and bloating as of late. Denies unexplained weight loss, dysphagia or sensation of things sticking in throat, BRBPR, melena, night sweats.       Review of Systems   Constitutional: Negative for appetite change, chills, fever and unexpected weight change.   HENT: Negative for hearing loss, sore throat and trouble swallowing.    Eyes: Negative for visual disturbance.   Respiratory: Negative for cough, chest tightness and shortness of breath.    Cardiovascular: Negative for chest pain and leg swelling.   Gastrointestinal: Negative for abdominal pain, blood in stool, constipation, diarrhea, nausea and vomiting.   Endocrine: Negative for polydipsia and polyuria.   Genitourinary: Negative for decreased urine volume, difficulty urinating, dysuria, frequency and urgency.   Musculoskeletal: Negative for gait problem.   Skin: Negative for rash.   Neurological: Negative for dizziness and numbness.   Psychiatric/Behavioral: The patient is not nervous/anxious.        Objective:      Vitals:    05/23/19 0857   BP: 110/70   Pulse: 68   Weight: 107.4 kg (236 lb 12.4 oz)   Height: 6' 3.6" (1.92 m)      Physical Exam   Constitutional: He is oriented to person, place, and time. He appears well-developed and well-nourished. No distress.   HENT:   Head: Normocephalic and atraumatic.   Mouth/Throat: Oropharynx is clear and moist. No oropharyngeal exudate.   Eyes: Pupils are equal, round, and reactive to light. Conjunctivae and EOM are normal. No scleral icterus.   Neck: No thyromegaly present.   Cardiovascular: Normal rate, regular rhythm and normal heart sounds.   No murmur heard.  Pulmonary/Chest: Effort normal and breath sounds normal. He has no wheezes. He has no rales.   Abdominal: Soft. He " exhibits no distension. There is no tenderness.   Musculoskeletal: He exhibits no edema or tenderness.   Lymphadenopathy:     He has no cervical adenopathy.   Neurological: He is alert and oriented to person, place, and time.   Skin: Skin is warm and dry.   Psychiatric: He has a normal mood and affect. His behavior is normal.       Assessment:       1. Annual physical exam    2. Skin abnormality    3. Prostate cancer screening    4. Nausea        Plan:       Michael was seen today for annual exam.    Diagnoses and all orders for this visit:    Annual physical exam  -     Comprehensive metabolic panel; Future  -     Lipid panel; Future  -     TSH; Future  -     CBC auto differential; Future  -     Hemoglobin A1c; Future    Skin abnormality  -     Ambulatory referral to Dermatology    Prostate cancer screening  -     PSA, Screening; Future    Nausea   Trial of daily h2 blocker for 4-6 weeks. Monitor EtOH and night time/late night meals or snack correlating with morning nausea.       RTC in 1 year or sooner jayla Blum MD  Internal Medicine-[facility]        Side effects of medication(s) were discussed in detail and patient voiced understanding.  Patient will call back for any issues or complications.

## 2019-07-02 DIAGNOSIS — I25.10 CORONARY ARTERY DISEASE INVOLVING NATIVE CORONARY ARTERY OF NATIVE HEART WITHOUT ANGINA PECTORIS: ICD-10-CM

## 2019-07-02 DIAGNOSIS — E78.00 PURE HYPERCHOLESTEROLEMIA: ICD-10-CM

## 2019-07-02 RX ORDER — ATORVASTATIN CALCIUM 80 MG/1
80 TABLET, FILM COATED ORAL DAILY
Qty: 90 TABLET | Refills: 3 | Status: SHIPPED | OUTPATIENT
Start: 2019-07-02 | End: 2020-08-25

## 2019-08-13 ENCOUNTER — OFFICE VISIT (OUTPATIENT)
Dept: URGENT CARE | Facility: CLINIC | Age: 49
End: 2019-08-13
Payer: COMMERCIAL

## 2019-08-13 VITALS
DIASTOLIC BLOOD PRESSURE: 75 MMHG | SYSTOLIC BLOOD PRESSURE: 120 MMHG | RESPIRATION RATE: 20 BRPM | OXYGEN SATURATION: 100 % | TEMPERATURE: 98 F | HEART RATE: 66 BPM | HEIGHT: 76 IN | WEIGHT: 238.13 LBS | BODY MASS INDEX: 29 KG/M2

## 2019-08-13 DIAGNOSIS — T21.22XA SECOND DEGREE BURN OF ABDOMEN, INITIAL ENCOUNTER: Primary | ICD-10-CM

## 2019-08-13 PROCEDURE — 99214 PR OFFICE/OUTPT VISIT, EST, LEVL IV, 30-39 MIN: ICD-10-PCS | Mod: S$GLB,,, | Performed by: PHYSICIAN ASSISTANT

## 2019-08-13 PROCEDURE — 99214 OFFICE O/P EST MOD 30 MIN: CPT | Mod: S$GLB,,, | Performed by: PHYSICIAN ASSISTANT

## 2019-08-13 PROCEDURE — 3008F BODY MASS INDEX DOCD: CPT | Mod: CPTII,S$GLB,, | Performed by: PHYSICIAN ASSISTANT

## 2019-08-13 PROCEDURE — 3008F PR BODY MASS INDEX (BMI) DOCUMENTED: ICD-10-PCS | Mod: CPTII,S$GLB,, | Performed by: PHYSICIAN ASSISTANT

## 2019-08-13 RX ORDER — SILVER SULFADIAZINE 10 G/1000G
CREAM TOPICAL 2 TIMES DAILY
Qty: 85 G | Refills: 1 | Status: SHIPPED | OUTPATIENT
Start: 2019-08-13 | End: 2019-08-23

## 2019-08-13 NOTE — PATIENT INSTRUCTIONS
- Rest.    - Drink plenty of fluids.    - Tylenol or Ibuprofen as directed as needed for fever/pain. Avoid tylenol if you have a history of liver disease. Do not take ibuprofen if you have a history of GI bleeding, kidney disease, or if you take blood thinners.     - keep wound clean.  Wash with soap and water daily.  Apply Silvadene as prescribed.    - watch for evidence of infection including increased redness, swelling, fever, red streaking.  Seek medical attention immediately if these occur.    - Follow up with your PCP or dermatology as directed in the next 1-2 weeks if not improved or as needed.  You can call [phone] to schedule an appointment with the appropriate provider.    - Go to the ER or seek medical attention immediately if you develop new or worsening symptoms.    - You must understand that you have received an Urgent Care treatment only and that you may be released before all of your medical problems are known or treated.   - You, the patient, will arrange for follow up care as instructed.   - If your condition worsens or fails to improve we recommend that you receive another evaluation at the ER immediately or contact your PCP to discuss your concerns or return here.         First- and Second-Degree Burns  A burn occurs when skin is exposed to too much heat, sun, or harsh chemicals. A first-degree burn (superficial burn) causes only redness, like a sunburn. It heals in a few days. A second-degree burn (partial-thickness burn) is deeper and causes a blister to form. This may take up to 2 weeks to heal.  Home care  Follow these guidelines when caring for yourself at home:  · On the first day, you may put a cool compress on the burn to relieve severe pain. You can use a small towel soaked in cool water as a cool compress.  · If a bandage was put on, change it once a day, unless you were told otherwise. If the bandage sticks, soak it off under warm running water.  · Before changing a bandage,  wash your hands. Then wash the area with soap and water to remove any cream, ointment, ooze, or scab. You may do this in a sink, under a tub faucet, or in the shower. Rinse off the soap and pat the area dry with a clean towel. Look for signs of infection listed below.  · Put on any prescribed cream or ointment to prevent infection. This also keeps the bandage from sticking.  · Cover the burn with a nonstick gauze. Then wrap it with the bandage material.  · Change the bandage as soon as you can if it gets wet or dirty.  · Unless a pain medicine was prescribed, use over-the-counter medicine to control pain. If you have chronic liver or kidney disease, talk with your health care provider before using acetaminophen or ibuprofen. Also talk with your provider if youve had a stomach ulcer or GI bleeding.  · Eat more calories and protein until the wound is healed. Drink plenty of water.  · Wear a hat, sunscreen, and long sleeves while in the sun to protect your skin.  · Dont pick or scratch at the affected areas.  · Wear loose-fitting clothing.  Follow-up care  Follow up with your healthcare provider, or as advised. Most burns heal without becoming infected. Sometimes an infection may occur even with proper treatment. Be sure to check the burn daily for the signs of infection listed below.  When to seek medical advice  Call your healthcare provider right away if any of these signs of infection occur:  · Pain in the wound gets worse  · Redness or swelling gets worse  · Pus comes from the wound  · Red streaks in your skin come from the burn  · Fever of 100.4º F (38º C) or higher, or as directed by your healthcare provider  · Wounds dont appear to be healing  · Nausea or vomiting   Date Last Reviewed: 1/1/2017  © 4937-0340 [employer]. [address] [place] [address], [place] [address]. All rights reserved. This information is not intended as a substitute for professional medical care. Always follow your healthcare  professional's instructions.

## 2019-08-13 NOTE — PROGRESS NOTES
"Subjective:       Patient ID: Michael Lowe is a 48 y.o. male.    Vitals:  height is 6' 4" (1.93 m) and weight is 108 kg (238 lb 1.6 oz). His temperature is 98.1 °F (36.7 °C). His blood pressure is 120/75 and his pulse is 66. His respiration is 20 and oxygen saturation is 100%.     Chief Complaint: Burn    Patient burned his right side of his torso 2 days ago at home when he picked up a hot  and the exhaust burned his shirt.  He states that when he took his shirt off it took off a top layer of skin.  He has been applying Silvadene and keeping the wound clean, but he recently ran out of Silvadene.  He states that the wound is doing better but needs more Silvadene.  He also is looking for recommendations to help keep the bandage in place because he sweats throughout the day for work and has trouble with the bandage slipping or falling.  Denies increased redness, swelling, red streaking, fever, or any constitutional symptoms.    Burn   The incident occurred 2 days ago. The burns occurred at home. The burns occurred while working on a project. The burns were a result of contact with a hot surface. The burns are located on the abdomen. The pain is at a severity of 2/10. The pain is mild. He has tried salve for the symptoms. The treatment provided mild relief.       Constitution: Negative for chills, sweating, fatigue and fever.   HENT: Negative for congestion and sore throat.    Neck: Negative for painful lymph nodes.   Cardiovascular: Negative for chest pain and leg swelling.   Eyes: Negative for double vision and blurred vision.   Respiratory: Negative for cough and shortness of breath.    Gastrointestinal: Negative for nausea, vomiting and diarrhea.   Genitourinary: Negative for dysuria, frequency and urgency.   Musculoskeletal: Negative for joint pain, joint swelling, muscle cramps and muscle ache.   Skin: Positive for lesion. Negative for color change, pale, rash, wound, abrasion, laceration, " skin thickening/induration, puncture wound, erythema, bruising, abscess, avulsion and hives.   Allergic/Immunologic: Negative for seasonal allergies and hives.   Neurological: Negative for dizziness, history of vertigo, light-headedness, passing out and headaches.   Hematologic/Lymphatic: Negative for swollen lymph nodes, easy bruising/bleeding and history of blood clots. Does not bruise/bleed easily.   Psychiatric/Behavioral: Negative for nervous/anxious, sleep disturbance and depression. The patient is not nervous/anxious.        Objective:      Physical Exam   Constitutional: He is oriented to person, place, and time. He appears well-developed and well-nourished.   HENT:   Head: Normocephalic and atraumatic. Head is without abrasion, without contusion and without laceration.   Right Ear: External ear normal.   Left Ear: External ear normal.   Nose: Nose normal.   Mouth/Throat: Oropharynx is clear and moist.   Eyes: Pupils are equal, round, and reactive to light. Conjunctivae, EOM and lids are normal.   Neck: Trachea normal, full passive range of motion without pain and phonation normal. Neck supple.   Cardiovascular: Normal rate, regular rhythm and normal heart sounds.   Pulmonary/Chest: Effort normal and breath sounds normal. No stridor. No respiratory distress.   Musculoskeletal: Normal range of motion.   Neurological: He is alert and oriented to person, place, and time.   Skin: Skin is warm, dry and intact. Capillary refill takes less than 2 seconds. Burn noted. No abrasion, no bruising, no ecchymosis, no laceration, no lesion and no rash noted. No erythema.        Psychiatric: He has a normal mood and affect. His speech is normal and behavior is normal. Judgment and thought content normal. Cognition and memory are normal.   Nursing note and vitals reviewed.            Applied Silvadene, nonstick bandage, and tape.  Wrap to torso with 6 in Ace bandage to help keep dressing secure.  Wound looks relatively well  without evidence of infection at this time.  Instructed to monitor closely for signs of infection and he can return for wound check if he is concerned.  Otherwise follow up with PCP within the next 1-2 weeks for wound check.    Assessment:       1. Second degree burn of abdomen, initial encounter        Plan:         Second degree burn of abdomen, initial encounter  -     silver sulfADIAZINE 1% (SILVADENE) 1 % cream; Apply topically 2 (two) times daily. for 10 days  Dispense: 85 g; Refill: 1  -     BANDAGE ELASTIC 6IN ACE      Patient Instructions   - Rest.    - Drink plenty of fluids.    - Tylenol or Ibuprofen as directed as needed for fever/pain. Avoid tylenol if you have a history of liver disease. Do not take ibuprofen if you have a history of GI bleeding, kidney disease, or if you take blood thinners.     - keep wound clean.  Wash with soap and water daily.  Apply Silvadene as prescribed.    - watch for evidence of infection including increased redness, swelling, fever, red streaking.  Seek medical attention immediately if these occur.    - Follow up with your PCP or dermatology as directed in the next 1-2 weeks if not improved or as needed.  You can call [phone] to schedule an appointment with the appropriate provider.    - Go to the ER or seek medical attention immediately if you develop new or worsening symptoms.    - You must understand that you have received an Urgent Care treatment only and that you may be released before all of your medical problems are known or treated.   - You, the patient, will arrange for follow up care as instructed.   - If your condition worsens or fails to improve we recommend that you receive another evaluation at the ER immediately or contact your PCP to discuss your concerns or return here.         First- and Second-Degree Burns  A burn occurs when skin is exposed to too much heat, sun, or harsh chemicals. A first-degree burn (superficial burn) causes only redness, like  a sunburn. It heals in a few days. A second-degree burn (partial-thickness burn) is deeper and causes a blister to form. This may take up to 2 weeks to heal.  Home care  Follow these guidelines when caring for yourself at home:  · On the first day, you may put a cool compress on the burn to relieve severe pain. You can use a small towel soaked in cool water as a cool compress.  · If a bandage was put on, change it once a day, unless you were told otherwise. If the bandage sticks, soak it off under warm running water.  · Before changing a bandage, wash your hands. Then wash the area with soap and water to remove any cream, ointment, ooze, or scab. You may do this in a sink, under a tub faucet, or in the shower. Rinse off the soap and pat the area dry with a clean towel. Look for signs of infection listed below.  · Put on any prescribed cream or ointment to prevent infection. This also keeps the bandage from sticking.  · Cover the burn with a nonstick gauze. Then wrap it with the bandage material.  · Change the bandage as soon as you can if it gets wet or dirty.  · Unless a pain medicine was prescribed, use over-the-counter medicine to control pain. If you have chronic liver or kidney disease, talk with your health care provider before using acetaminophen or ibuprofen. Also talk with your provider if youve had a stomach ulcer or GI bleeding.  · Eat more calories and protein until the wound is healed. Drink plenty of water.  · Wear a hat, sunscreen, and long sleeves while in the sun to protect your skin.  · Dont pick or scratch at the affected areas.  · Wear loose-fitting clothing.  Follow-up care  Follow up with your healthcare provider, or as advised. Most burns heal without becoming infected. Sometimes an infection may occur even with proper treatment. Be sure to check the burn daily for the signs of infection listed below.  When to seek medical advice  Call your healthcare provider right away if any of these  signs of infection occur:  · Pain in the wound gets worse  · Redness or swelling gets worse  · Pus comes from the wound  · Red streaks in your skin come from the burn  · Fever of 100.4º F (38º C) or higher, or as directed by your healthcare provider  · Wounds dont appear to be healing  · Nausea or vomiting   Date Last Reviewed: 1/1/2017  © 2640-0120 [employer]. [address] [place] [address], [place] [address]. All rights reserved. This information is not intended as a substitute for professional medical care. Always follow your healthcare professional's instructions.

## 2019-12-23 ENCOUNTER — TELEPHONE (OUTPATIENT)
Dept: ORTHOPEDICS | Facility: CLINIC | Age: 49
End: 2019-12-23

## 2019-12-23 ENCOUNTER — PATIENT MESSAGE (OUTPATIENT)
Dept: ORTHOPEDICS | Facility: CLINIC | Age: 49
End: 2019-12-23

## 2019-12-23 NOTE — TELEPHONE ENCOUNTER
VM left for patient in reference to him wanting to schedule an appointment. Awaiting return call.     Arelis Mcknight LPN

## 2020-01-06 ENCOUNTER — TELEPHONE (OUTPATIENT)
Dept: ORTHOPEDICS | Facility: CLINIC | Age: 50
End: 2020-01-06

## 2020-01-06 ENCOUNTER — PATIENT OUTREACH (OUTPATIENT)
Dept: ADMINISTRATIVE | Facility: OTHER | Age: 50
End: 2020-01-06

## 2020-01-06 DIAGNOSIS — R52 PAIN: Primary | ICD-10-CM

## 2020-01-08 ENCOUNTER — OFFICE VISIT (OUTPATIENT)
Dept: ORTHOPEDICS | Facility: CLINIC | Age: 50
End: 2020-01-08
Payer: COMMERCIAL

## 2020-01-08 ENCOUNTER — HOSPITAL ENCOUNTER (OUTPATIENT)
Dept: RADIOLOGY | Facility: OTHER | Age: 50
Discharge: HOME | End: 2020-01-08
Attending: PHYSICIAN ASSISTANT
Payer: COMMERCIAL

## 2020-01-08 VITALS
SYSTOLIC BLOOD PRESSURE: 132 MMHG | WEIGHT: 238 LBS | DIASTOLIC BLOOD PRESSURE: 82 MMHG | HEART RATE: 60 BPM | BODY MASS INDEX: 28.98 KG/M2 | HEIGHT: 76 IN

## 2020-01-08 DIAGNOSIS — G56.21 ULNAR NERVE ENTRAPMENT AT RIGHT ELBOW: ICD-10-CM

## 2020-01-08 DIAGNOSIS — R52 PAIN: ICD-10-CM

## 2020-01-08 DIAGNOSIS — G56.02 CARPAL TUNNEL SYNDROME OF LEFT WRIST: Primary | ICD-10-CM

## 2020-01-08 DIAGNOSIS — G56.22 ENTRAPMENT OF LEFT ULNAR NERVE: ICD-10-CM

## 2020-01-08 DIAGNOSIS — R52 PAIN: Primary | ICD-10-CM

## 2020-01-08 PROCEDURE — 73080 X-RAY EXAM OF ELBOW: CPT | Mod: TC,FY,LT

## 2020-01-08 PROCEDURE — 3008F BODY MASS INDEX DOCD: CPT | Mod: CPTII,S$GLB,, | Performed by: PHYSICIAN ASSISTANT

## 2020-01-08 PROCEDURE — 73080 X-RAY EXAM OF ELBOW: CPT | Mod: 26,LT,, | Performed by: RADIOLOGY

## 2020-01-08 PROCEDURE — 73130 X-RAY EXAM OF HAND: CPT | Mod: TC,FY,LT

## 2020-01-08 PROCEDURE — 3008F PR BODY MASS INDEX (BMI) DOCUMENTED: ICD-10-PCS | Mod: CPTII,S$GLB,, | Performed by: PHYSICIAN ASSISTANT

## 2020-01-08 PROCEDURE — 73130 X-RAY EXAM OF HAND: CPT | Mod: 26,LT,, | Performed by: RADIOLOGY

## 2020-01-08 PROCEDURE — 99213 PR OFFICE/OUTPT VISIT, EST, LEVL III, 20-29 MIN: ICD-10-PCS | Mod: S$GLB,,, | Performed by: PHYSICIAN ASSISTANT

## 2020-01-08 PROCEDURE — 99213 OFFICE O/P EST LOW 20 MIN: CPT | Mod: S$GLB,,, | Performed by: PHYSICIAN ASSISTANT

## 2020-01-08 PROCEDURE — 73130 XR HAND COMPLETE 3 VIEW LEFT: ICD-10-PCS | Mod: 26,LT,, | Performed by: RADIOLOGY

## 2020-01-08 PROCEDURE — 99999 PR PBB SHADOW E&M-EST. PATIENT-LVL III: CPT | Mod: PBBFAC,,, | Performed by: PHYSICIAN ASSISTANT

## 2020-01-08 PROCEDURE — 73080 XR ELBOW COMPLETE 3 VIEW LEFT: ICD-10-PCS | Mod: 26,LT,, | Performed by: RADIOLOGY

## 2020-01-08 PROCEDURE — 99999 PR PBB SHADOW E&M-EST. PATIENT-LVL III: ICD-10-PCS | Mod: PBBFAC,,, | Performed by: PHYSICIAN ASSISTANT

## 2020-01-08 NOTE — PROGRESS NOTES
Subjective:      Patient ID: Michael Lowe is a 49 y.o. male.    Chief Complaint: Pain of the Left Hand and Pain of the Right Hand      HPI  Michael Lowe is a 49 y.o. male presenting today for intermittent finger numbness and tingling on the left.  He reports that he notices the numbness predominantly in the ring and small fingers. The numbness and tingling began 3-4 weeks ago.  His symptoms are worse at night when the elbow is flexed for sleep, improves when he relaxes the elbow and sleeps with the arm above his head.  He has few symptoms during the day.  He wants to address this early, due to his history of severe nerve compression on the right.  He is status post Right ulnar nerve decompression at the elbow with neurolysis under loupe magnification, submuscular transposition, wrapping with AxoGen nerve wrap and Clarix placement, Right median nerve neurolysis under loupe magnification at the carpal tunnel, wrapping the median nerve with AxoGen nerve wrap, and Clarix membrane placement by Dr. Harden on 1/4/19.      Review of patient's allergies indicates:   Allergen Reactions    No known drug allergies          Current Outpatient Medications   Medication Sig Dispense Refill    atorvastatin (LIPITOR) 80 MG tablet Take 1 tablet (80 mg total) by mouth once daily. 90 tablet 3    C/sourcherry/celery/grape seed (TART CHERRY ORAL) Take by mouth.      lysine 500 mg Tab Take 1 tablet by mouth once daily.       milk thistle 175 mg tablet Take 175 mg by mouth once daily.      omega-3 fatty acids-vitamin E (FISH OIL) 1,000 mg Cap Take 1 capsule by mouth once daily.       No current facility-administered medications for this visit.      Facility-Administered Medications Ordered in Other Visits   Medication Dose Route Frequency Provider Last Rate Last Dose    mupirocin 2 % ointment   Nasal On Call Procedure Brian Rider MD           Past Medical History:   Diagnosis Date    Carpal tunnel  "syndrome of right wrist     Cervical disc herniation     Hyperlipidemia     Low back pain        Past Surgical History:   Procedure Laterality Date    CARPAL TUNNEL RELEASE Right 1/4/2019    Procedure: RELEASE, CARPAL TUNNEL right revision;  Surgeon: Mackenzie Harden MD;  Location: [facility], [place];  Service: Orthopedics;  Laterality: Right;  Anesthesia: General and Regional. Stretcher, supine, hand pan 1 and pan 2, CLARIX, AXOGEN    RELEASE OF ULNAR NERVE AT CUBITAL TUNNEL Right 1/4/2019    Procedure: RELEASE, ULNAR TUNNEL right elbow (submuscular transposition);  Surgeon: Mackenzie Harden MD;  Location: [facility], [place] [facility];  Service: Orthopedics;  Laterality: Right;  Anesthesia: General and Regional. Stretcher, supine, hand pan 1 and pan 2, CLARIX, AXOGEN    right carpal tunnel release  10-23-13         Review of Systems:  Review of Systems   Constitution: Negative for chills and fever.   Skin: Negative for rash and suspicious lesions.   Musculoskeletal:        See HPI   Neurological: Negative for dizziness, headaches and light-headedness.   Psychiatric/Behavioral: Negative for depression. The patient is not nervous/anxious.          OBJECTIVE:     PHYSICAL EXAM:  Height: 6' 4" (193 cm) Weight: 108 kg (238 lb)  Vitals:    01/08/20 0808   BP: 132/82   Pulse: 60   Weight: 108 kg (238 lb)   Height: 6' 4" (1.93 m)   PainSc:   1     General    Vitals reviewed.  Constitutional: He is oriented to person, place, and time. He appears well-developed and well-nourished.   HENT:   Head: Normocephalic and atraumatic.   Neck: Normal range of motion.   Cardiovascular: Normal rate.    Pulmonary/Chest: Effort normal. No respiratory distress.   Neurological: He is alert and oriented to person, place, and time.   Psychiatric: He has a normal mood and affect. His behavior is normal. Judgment and thought content normal.             Musculoskeletal: well-healed surgical scars on the right, no scars on the left.  No edema appreciated.  There " is muscle atrophy on the right, no muscle atrophy appreciated on the left.  Good finger, wrist, and elbow range of motion bilaterally. No pain with motion. Neurovascularly intact-decreased ulnar nerve sensation on the right compared to left, equal radial nerve sensation and median nerve sensation reported.  Decreased ulnar motor function on the right compared to left, equal AIN and PIN function. Good capillary refill, 2+ radial pulses. Positive Tinel's over the cubital tunnel and carpal tunnel on the left, negative at Guyon's canal. Positive Durkan's on the left, positive ulnar nerve compression test on the left.    EMG, 5/2018:  Impression   This is an abnormal study. There is electrophysiologic evidence of:   1. Severe right carpal tunnel syndrome. When compared to the previous study done in 2013 the median nerve sensory and motor responses have significantly worsened in terms of axonal loss.   2. Severe right ulnar neuropathy. Again, when compared to the previous study there has been a significant degree of both sensory and motor axonal loss during the interval, with more profound losses evident in the motor study.   3. A mild left carpal tunnel syndrome.   4. Active and chronic denervation of muscles innervated by the right median and ulnar nerves.     Given the normal responses in the right MAC and LAC, a brachial plexus lesion is not likely to be responsible for the patients condition.     RADIOGRAPHS:  Left hand x-ray, 1/8/2020  FINDINGS:  No fracture or dislocation.  No bone destruction identified      Impression     See above       Left elbow x-ray, 1/8/2020  FINDINGS:  No fracture or dislocation.  No bone destruction identified      Impression     See above     Comments: I have personally reviewed the imaging and I agree with the above radiologist's report.    ASSESSMENT/PLAN:   Michael was seen today for pain and pain.    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  Discussed symptoms consistent with ulnar nerve compression at the cubital tunnel.  We discussed bracing, nerve glides, therapy.  We discussed repeat EMG, EMG ordered and scheduled.  - patient will follow-up after EMG  - orders for OT placed  - call with questions or concerns    Disclaimer: This note has been generated using voice-recognition software. There may be typographical errors that have been missed during proof-reading.

## 2020-01-24 ENCOUNTER — LAB VISIT (OUTPATIENT)
Dept: LAB | Facility: OTHER | Age: 50
End: 2020-01-24
Attending: INTERNAL MEDICINE
Payer: COMMERCIAL

## 2020-01-24 ENCOUNTER — OFFICE VISIT (OUTPATIENT)
Dept: INTERNAL MEDICINE | Facility: CLINIC | Age: 50
End: 2020-01-24
Attending: INTERNAL MEDICINE
Payer: COMMERCIAL

## 2020-01-24 VITALS
HEIGHT: 75 IN | BODY MASS INDEX: 30.62 KG/M2 | WEIGHT: 246.25 LBS | SYSTOLIC BLOOD PRESSURE: 130 MMHG | DIASTOLIC BLOOD PRESSURE: 82 MMHG

## 2020-01-24 DIAGNOSIS — E78.00 PURE HYPERCHOLESTEROLEMIA: ICD-10-CM

## 2020-01-24 DIAGNOSIS — Z00.00 ANNUAL PHYSICAL EXAM: Primary | ICD-10-CM

## 2020-01-24 DIAGNOSIS — I25.10 CORONARY ARTERY DISEASE INVOLVING NATIVE CORONARY ARTERY OF NATIVE HEART WITHOUT ANGINA PECTORIS: ICD-10-CM

## 2020-01-24 DIAGNOSIS — Z12.5 PROSTATE CANCER SCREENING: ICD-10-CM

## 2020-01-24 DIAGNOSIS — R29.898 XIPHOID PROMINENCE: ICD-10-CM

## 2020-01-24 DIAGNOSIS — Z00.00 ANNUAL PHYSICAL EXAM: ICD-10-CM

## 2020-01-24 DIAGNOSIS — R07.89 CHEST WALL PAIN: ICD-10-CM

## 2020-01-24 LAB
ALBUMIN SERPL BCP-MCNC: 4.2 G/DL (ref 3.5–5.2)
ALP SERPL-CCNC: 92 U/L (ref 55–135)
ALT SERPL W/O P-5'-P-CCNC: 86 U/L (ref 10–44)
ANION GAP SERPL CALC-SCNC: 10 MMOL/L (ref 8–16)
AST SERPL-CCNC: 27 U/L (ref 10–40)
BASOPHILS # BLD AUTO: 0.01 K/UL (ref 0–0.2)
BASOPHILS NFR BLD: 0.2 % (ref 0–1.9)
BILIRUB SERPL-MCNC: 0.6 MG/DL (ref 0.1–1)
BUN SERPL-MCNC: 13 MG/DL (ref 6–20)
CALCIUM SERPL-MCNC: 9.5 MG/DL (ref 8.7–10.5)
CHLORIDE SERPL-SCNC: 105 MMOL/L (ref 95–110)
CHOLEST SERPL-MCNC: 165 MG/DL (ref 120–199)
CHOLEST/HDLC SERPL: 4.7 {RATIO} (ref 2–5)
CO2 SERPL-SCNC: 27 MMOL/L (ref 23–29)
COMPLEXED PSA SERPL-MCNC: 1 NG/ML (ref 0–4)
CREAT SERPL-MCNC: 0.9 MG/DL (ref 0.5–1.4)
DIFFERENTIAL METHOD: ABNORMAL
EOSINOPHIL # BLD AUTO: 0 K/UL (ref 0–0.5)
EOSINOPHIL NFR BLD: 0.6 % (ref 0–8)
ERYTHROCYTE [DISTWIDTH] IN BLOOD BY AUTOMATED COUNT: 11.3 % (ref 11.5–14.5)
EST. GFR  (AFRICAN AMERICAN): >60 ML/MIN/1.73 M^2
EST. GFR  (NON AFRICAN AMERICAN): >60 ML/MIN/1.73 M^2
ESTIMATED AVG GLUCOSE: 126 MG/DL (ref 68–131)
GLUCOSE SERPL-MCNC: 108 MG/DL (ref 70–110)
HBA1C MFR BLD HPLC: 6 % (ref 4–5.6)
HCT VFR BLD AUTO: 44.4 % (ref 40–54)
HDLC SERPL-MCNC: 35 MG/DL (ref 40–75)
HDLC SERPL: 21.2 % (ref 20–50)
HGB BLD-MCNC: 14.9 G/DL (ref 14–18)
IMM GRANULOCYTES # BLD AUTO: 0 K/UL (ref 0–0.04)
IMM GRANULOCYTES NFR BLD AUTO: 0 % (ref 0–0.5)
LDLC SERPL CALC-MCNC: 97.8 MG/DL (ref 63–159)
LYMPHOCYTES # BLD AUTO: 2.4 K/UL (ref 1–4.8)
LYMPHOCYTES NFR BLD: 44.9 % (ref 18–48)
MCH RBC QN AUTO: 31.4 PG (ref 27–31)
MCHC RBC AUTO-ENTMCNC: 33.6 G/DL (ref 32–36)
MCV RBC AUTO: 94 FL (ref 82–98)
MONOCYTES # BLD AUTO: 0.4 K/UL (ref 0.3–1)
MONOCYTES NFR BLD: 8 % (ref 4–15)
NEUTROPHILS # BLD AUTO: 2.4 K/UL (ref 1.8–7.7)
NEUTROPHILS NFR BLD: 46.3 % (ref 38–73)
NONHDLC SERPL-MCNC: 130 MG/DL
NRBC BLD-RTO: 0 /100 WBC
PLATELET # BLD AUTO: 250 K/UL (ref 150–350)
PMV BLD AUTO: 9 FL (ref 9.2–12.9)
POTASSIUM SERPL-SCNC: 4.1 MMOL/L (ref 3.5–5.1)
PROT SERPL-MCNC: 7.3 G/DL (ref 6–8.4)
RBC # BLD AUTO: 4.75 M/UL (ref 4.6–6.2)
SODIUM SERPL-SCNC: 142 MMOL/L (ref 136–145)
TRIGL SERPL-MCNC: 161 MG/DL (ref 30–150)
TSH SERPL DL<=0.005 MIU/L-ACNC: 1.35 UIU/ML (ref 0.4–4)
WBC # BLD AUTO: 5.23 K/UL (ref 3.9–12.7)

## 2020-01-24 PROCEDURE — 83036 HEMOGLOBIN GLYCOSYLATED A1C: CPT

## 2020-01-24 PROCEDURE — 36415 COLL VENOUS BLD VENIPUNCTURE: CPT

## 2020-01-24 PROCEDURE — 80053 COMPREHEN METABOLIC PANEL: CPT

## 2020-01-24 PROCEDURE — 99999 PR PBB SHADOW E&M-EST. PATIENT-LVL III: ICD-10-PCS | Mod: PBBFAC,,, | Performed by: INTERNAL MEDICINE

## 2020-01-24 PROCEDURE — 84443 ASSAY THYROID STIM HORMONE: CPT

## 2020-01-24 PROCEDURE — 80061 LIPID PANEL: CPT

## 2020-01-24 PROCEDURE — 99396 PREV VISIT EST AGE 40-64: CPT | Mod: S$GLB,,, | Performed by: INTERNAL MEDICINE

## 2020-01-24 PROCEDURE — 99396 PR PREVENTIVE VISIT,EST,40-64: ICD-10-PCS | Mod: S$GLB,,, | Performed by: INTERNAL MEDICINE

## 2020-01-24 PROCEDURE — 84153 ASSAY OF PSA TOTAL: CPT

## 2020-01-24 PROCEDURE — 99999 PR PBB SHADOW E&M-EST. PATIENT-LVL III: CPT | Mod: PBBFAC,,, | Performed by: INTERNAL MEDICINE

## 2020-01-24 PROCEDURE — 85025 COMPLETE CBC W/AUTO DIFF WBC: CPT

## 2020-01-24 NOTE — PROGRESS NOTES
"Subjective:       Patient ID: Michael Lowe is a 49 y.o. male.    Chief Complaint: Annual Exam    Here for annual exam    Chest protrusion may have enlarged.  It is tender if he bumps into.  Was seen or 13 years prior had plain films of ribs without any significant findings.  No unexplained weight loss, fevers chills, sweats, abdominal pains, shortness of breath, palpitations, chest pain at rest or with exertion.    Left leg spasms at times, once a week while going to bed.  He denies weakness of lower extremity, low back pain, hip pain, numbness or tingling, foot drop, tremors or shakes atrophy, HA, blurry vision.     Non obstructing CAD seen on coronary calcium scan  He is tolerating atorvastatin 40 mg.      Review of Systems   Constitutional: Negative for appetite change, chills, fever and unexpected weight change.   HENT: Negative for hearing loss, sore throat and trouble swallowing.    Eyes: Negative for visual disturbance.   Respiratory: Negative for cough, chest tightness and shortness of breath.    Cardiovascular: Negative for chest pain and leg swelling.   Gastrointestinal: Negative for abdominal pain, blood in stool, constipation, diarrhea, nausea and vomiting.   Endocrine: Negative for polydipsia and polyuria.   Genitourinary: Negative for decreased urine volume, difficulty urinating, dysuria, frequency and urgency.   Musculoskeletal: Negative for gait problem.   Skin: Negative for rash.   Neurological: Negative for dizziness and numbness.   Psychiatric/Behavioral: The patient is not nervous/anxious.        Objective:      Vitals:    01/24/20 0850   BP: 130/82   Weight: 111.7 kg (246 lb 4.1 oz)   Height: 6' 3" (1.905 m)      Physical Exam   Constitutional: He is oriented to person, place, and time. He appears well-developed and well-nourished. No distress.   HENT:   Head: Normocephalic and atraumatic.   Mouth/Throat: Oropharynx is clear and moist. No oropharyngeal exudate.   Eyes: Pupils are equal, " round, and reactive to light. Conjunctivae and EOM are normal. No scleral icterus.   Neck: No thyromegaly present.   Cardiovascular: Normal rate, regular rhythm and normal heart sounds.   No murmur heard.  Pulmonary/Chest: Effort normal and breath sounds normal. He has no wheezes. He has no rales.       Abdominal: Soft. He exhibits no distension. There is no tenderness.   Musculoskeletal: He exhibits no edema or tenderness.   Lymphadenopathy:     He has no cervical adenopathy.   Neurological: He is alert and oriented to person, place, and time.   Skin: Skin is warm and dry.   Psychiatric: He has a normal mood and affect. His behavior is normal.       Assessment:       1. Annual physical exam    2. Prostate cancer screening    3. Chest wall pain    4. Xiphoid prominence    5. Coronary artery disease involving native coronary artery of native heart without angina pectoris    6. Pure hypercholesterolemia        Plan:       Michael was seen today for annual exam.    Diagnoses and all orders for this visit:    Annual physical exam  -     Comprehensive metabolic panel; Future  -     Lipid panel; Future  -     TSH; Future  -     CBC auto differential; Future  -     Hemoglobin A1c; Future    Prostate cancer screening  -     PSA, Screening; Future    Chest wall pain  -     CT Chest Without Contrast; Future    Xiphoid prominence  -     CT Chest Without Contrast; Future    Coronary artery disease involving native coronary artery of native heart without angina pectoris   Asymptomatic.  Continue statin.  Pure hypercholesterolemia  Tolerating statin. Continue this.        RTC in 1 year or sooner jayla Blum MD  Internal Medicine-[facility]        Side effects of medication(s) were discussed in detail and patient voiced understanding.  Patient will call back for any issues or complications.

## 2020-01-28 ENCOUNTER — HOSPITAL ENCOUNTER (OUTPATIENT)
Dept: RADIOLOGY | Facility: OTHER | Age: 50
Discharge: HOME | End: 2020-01-28
Attending: INTERNAL MEDICINE
Payer: COMMERCIAL

## 2020-01-28 DIAGNOSIS — R07.89 CHEST WALL PAIN: ICD-10-CM

## 2020-01-28 DIAGNOSIS — R29.898 XIPHOID PROMINENCE: ICD-10-CM

## 2020-01-28 PROCEDURE — 71250 CT THORAX DX C-: CPT | Mod: 26,,, | Performed by: RADIOLOGY

## 2020-01-28 PROCEDURE — 71250 CT THORAX DX C-: CPT | Mod: TC

## 2020-01-28 PROCEDURE — 71250 CT CHEST WITHOUT CONTRAST: ICD-10-PCS | Mod: 26,,, | Performed by: RADIOLOGY

## 2020-02-06 ENCOUNTER — PROCEDURE VISIT (OUTPATIENT)
Dept: NEUROLOGY | Facility: CLINIC | Age: 50
End: 2020-02-06
Payer: COMMERCIAL

## 2020-02-06 DIAGNOSIS — G56.22 ENTRAPMENT OF LEFT ULNAR NERVE: ICD-10-CM

## 2020-02-06 DIAGNOSIS — G56.21 ULNAR NERVE ENTRAPMENT AT RIGHT ELBOW: ICD-10-CM

## 2020-02-06 DIAGNOSIS — G56.02 CARPAL TUNNEL SYNDROME OF LEFT WRIST: ICD-10-CM

## 2020-02-06 PROCEDURE — 95886 PR EMG COMPLETE, W/ NERVE CONDUCTION STUDIES, 5+ MUSCLES: ICD-10-PCS | Mod: S$GLB,,, | Performed by: PSYCHIATRY & NEUROLOGY

## 2020-02-06 PROCEDURE — 95913 NRV CNDJ TEST 13/> STUDIES: CPT | Mod: S$GLB,,, | Performed by: PSYCHIATRY & NEUROLOGY

## 2020-02-06 PROCEDURE — 95886 MUSC TEST DONE W/N TEST COMP: CPT | Mod: S$GLB,,, | Performed by: PSYCHIATRY & NEUROLOGY

## 2020-02-06 PROCEDURE — 95913 PR NERVE CONDUCTION STUDY; 13 OR MORE STUDIES: ICD-10-PCS | Mod: S$GLB,,, | Performed by: PSYCHIATRY & NEUROLOGY

## 2020-02-06 NOTE — PROCEDURES
Procedures     Please see NCS/EMG procedure report    Charles Lombardo MD  [facility]

## 2020-02-11 ENCOUNTER — OFFICE VISIT (OUTPATIENT)
Dept: ORTHOPEDICS | Facility: CLINIC | Age: 50
End: 2020-02-11
Payer: COMMERCIAL

## 2020-02-11 VITALS
SYSTOLIC BLOOD PRESSURE: 121 MMHG | HEART RATE: 77 BPM | WEIGHT: 246 LBS | DIASTOLIC BLOOD PRESSURE: 80 MMHG | BODY MASS INDEX: 30.59 KG/M2 | HEIGHT: 75 IN

## 2020-02-11 DIAGNOSIS — G56.22 ENTRAPMENT OF LEFT ULNAR NERVE: ICD-10-CM

## 2020-02-11 DIAGNOSIS — G56.02 CARPAL TUNNEL SYNDROME OF LEFT WRIST: Primary | ICD-10-CM

## 2020-02-11 PROCEDURE — 99214 OFFICE O/P EST MOD 30 MIN: CPT | Mod: S$GLB,,, | Performed by: ORTHOPAEDIC SURGERY

## 2020-02-11 PROCEDURE — 3008F BODY MASS INDEX DOCD: CPT | Mod: CPTII,S$GLB,, | Performed by: ORTHOPAEDIC SURGERY

## 2020-02-11 PROCEDURE — 99214 PR OFFICE/OUTPT VISIT, EST, LEVL IV, 30-39 MIN: ICD-10-PCS | Mod: S$GLB,,, | Performed by: ORTHOPAEDIC SURGERY

## 2020-02-11 PROCEDURE — 99999 PR PBB SHADOW E&M-EST. PATIENT-LVL III: CPT | Mod: PBBFAC,,, | Performed by: ORTHOPAEDIC SURGERY

## 2020-02-11 PROCEDURE — 99999 PR PBB SHADOW E&M-EST. PATIENT-LVL III: ICD-10-PCS | Mod: PBBFAC,,, | Performed by: ORTHOPAEDIC SURGERY

## 2020-02-11 PROCEDURE — 3008F PR BODY MASS INDEX (BMI) DOCUMENTED: ICD-10-PCS | Mod: CPTII,S$GLB,, | Performed by: ORTHOPAEDIC SURGERY

## 2020-02-11 NOTE — PROGRESS NOTES
The patient was seen to go over the EMG results. The patient is still having numbness and some weakness on left side symptoms.     The EMG is positive for Carpal Tunnel Syndrome. It is severe in nature.+ cubital tunnel on left    Plan: We discussed there results of the EMG in detail. WE discussed conservative treatment as well as surgical treatment.  The Patient is interested in surgery at this time.  I have explained the risks, benefits, and alternatives of the procedure to the patient in great detail. The patient voices understanding and all questions have been answered. The patient agrees with to proceed as planned. Consents were performed in clinic.  Pt has no pain on right but still has significant atrophy- it is stable since before surgery

## 2020-02-12 DIAGNOSIS — E78.00 PURE HYPERCHOLESTEROLEMIA: ICD-10-CM

## 2020-02-12 DIAGNOSIS — I25.10 CORONARY ARTERY DISEASE INVOLVING NATIVE CORONARY ARTERY OF NATIVE HEART WITHOUT ANGINA PECTORIS: ICD-10-CM

## 2020-02-12 RX ORDER — ATORVASTATIN CALCIUM 80 MG/1
80 TABLET, FILM COATED ORAL DAILY
Qty: 90 TABLET | Refills: 3 | Status: CANCELLED | OUTPATIENT
Start: 2020-02-12 | End: 2021-02-12

## 2020-02-18 DIAGNOSIS — G56.22 ULNAR NERVE COMPRESSION, LEFT: Primary | ICD-10-CM

## 2020-02-26 ENCOUNTER — PATIENT MESSAGE (OUTPATIENT)
Dept: SURGERY | Facility: OTHER | Age: 50
End: 2020-02-26

## 2020-02-27 ENCOUNTER — ANESTHESIA EVENT (OUTPATIENT)
Dept: SURGERY | Facility: OTHER | Age: 50
End: 2020-02-27
Payer: COMMERCIAL

## 2020-02-27 ENCOUNTER — HOSPITAL ENCOUNTER (OUTPATIENT)
Dept: PREADMISSION TESTING | Facility: OTHER | Age: 50
Discharge: HOME | End: 2020-02-27
Attending: ORTHOPAEDIC SURGERY
Payer: COMMERCIAL

## 2020-02-27 VITALS
WEIGHT: 246 LBS | OXYGEN SATURATION: 96 % | HEIGHT: 75 IN | BODY MASS INDEX: 30.59 KG/M2 | TEMPERATURE: 98 F | SYSTOLIC BLOOD PRESSURE: 141 MMHG | HEART RATE: 77 BPM | DIASTOLIC BLOOD PRESSURE: 80 MMHG

## 2020-02-27 RX ORDER — SODIUM CHLORIDE, SODIUM LACTATE, POTASSIUM CHLORIDE, CALCIUM CHLORIDE 600; 310; 30; 20 MG/100ML; MG/100ML; MG/100ML; MG/100ML
INJECTION, SOLUTION INTRAVENOUS CONTINUOUS
Status: CANCELLED | OUTPATIENT
Start: 2020-02-27

## 2020-02-27 RX ORDER — ACETAMINOPHEN 500 MG
1000 TABLET ORAL
Status: CANCELLED | OUTPATIENT
Start: 2020-02-27 | End: 2020-02-27

## 2020-02-27 RX ORDER — LIDOCAINE HYDROCHLORIDE 10 MG/ML
0.5 INJECTION, SOLUTION EPIDURAL; INFILTRATION; INTRACAUDAL; PERINEURAL ONCE
Status: CANCELLED | OUTPATIENT
Start: 2020-02-27 | End: 2020-02-27

## 2020-02-27 RX ORDER — PREGABALIN 50 MG/1
50 CAPSULE ORAL ONCE
Status: CANCELLED | OUTPATIENT
Start: 2020-02-27 | End: 2020-02-27

## 2020-02-27 NOTE — ANESTHESIA PREPROCEDURE EVALUATION
02/27/2020  Michael Lowe is a 49 y.o., male.    Anesthesia Evaluation    I have reviewed the Patient Summary Reports.    I have reviewed the Nursing Notes.   I have reviewed the Medications.     Review of Systems  Anesthesia Hx:  No problems with previous Anesthesia  History of prior surgery of interest to airway management or planning: Previous anesthesia: General 1/19 Ulnar nerve with general anesthesia.  Procedure performed at an [facility]. Airway issues documented on chart review include laryngeal mask airway used  Denies Family Hx of Anesthesia complications.   Denies Personal Hx of Anesthesia complications.   Social:  Non-Smoker    Hematology/Oncology:  Hematology Normal   Oncology Normal     EENT/Dental:EENT/Dental Normal   Cardiovascular:   CAD (non-obstructive, dx on CA++ scan)   hyperlipidemia    Pulmonary:  Pulmonary Normal    Renal/:  Renal/ Normal     Hepatic/GI:  Hepatic/GI Normal    Musculoskeletal:  Musculoskeletal Normal    Neurological:  Neurology Normal    Dermatological:  Skin Normal    Psych:  Psychiatric Normal           Physical Exam  General:  Well nourished    Airway/Jaw/Neck:  Airway Findings: Mouth Opening: Normal Tongue: Normal  Mallampati: I      Dental:  Dental Findings: In tact        Mental Status:  Mental Status Findings:         Anesthesia Plan  Type of Anesthesia, risks & benefits discussed:  Anesthesia Type:  general  Patient's Preference:   Intra-op Monitoring Plan:   Intra-op Monitoring Plan Comments:   Post Op Pain Control Plan: multimodal analgesia, peripheral nerve block and per primary service following discharge from PACU  Post Op Pain Control Plan Comments:   Induction:   IV  Beta Blocker:         Informed Consent: Patient understands risks and agrees with Anesthesia plan.  Questions answered. Anesthesia consent signed with patient.  ASA Score: 2      Day of Surgery Review of History & Physical:    H&P update referred to the surgeon.     Anesthesia Plan Notes: Labs in [facility], [place]  Same surgery one year ago, had GA and POST-OP block, did very well, requests the same thing        Ready For Surgery From Anesthesia Perspective.

## 2020-02-27 NOTE — DISCHARGE INSTRUCTIONS
Information to Prepare you for your Surgery    PRE-ADMIT TESTING -  [phone]    [address]  [place] [facility]          Your surgery has been scheduled at [facility]. We are pleased to have the opportunity to serve you. For Further Information please call [phone].    On the day of surgery please report to the Information Desk on the 1st floor.    · CONTACT YOUR PHYSICIAN'S OFFICE THE DAY PRIOR TO YOUR SURGERY TO OBTAIN YOUR ARRIVAL TIME.     · The evening before surgery do not eat anything after 9 p.m. ( this includes hard candy, chewing gum and mints).  You may only have GATORADE, POWERADE AND WATER  from 9 p.m. until you leave your home.   DO NOT DRINK ANY LIQUIDS ON THE WAY TO THE HOSPITAL.      SPECIAL MEDICATION INSTRUCTIONS: TAKE medications checked off by the Anesthesiologist on your Medication List.    Angiogram Patients: Take medications as instructed by your physician, including aspirin.     Surgery Patients:    If you take ASPIRIN - Your PHYSICIAN/SURGEON will need to inform you IF/OR when you need to stop taking aspirin prior to your surgery.     Do Not take any medications containing IBUPROFEN.  Do Not Wear any make-up or dark nail polish   (especially eye make-up) to surgery. If you come to surgery with makeup on you will be required to remove the makeup or nail polish.    Do not shave your surgical area at least 5 days prior to your surgery. The surgical prep will be performed at the hospital according to Infection Control regulations.    Leave all valuables at home.   Do Not wear any jewelry or watches, including any metal in body piercings. Jewelry must be removed prior to coming to the hospital.  There is a possibility that rings that are unable to be removed may be cut off if they are on the surgical extremity.    Contact Lens must be removed before surgery. Either do not wear the contact lens or bring a case and solution for  storage.  Please bring a container for eyeglasses or dentures as required.  Bring any paperwork your physician has provided, such as consent forms,  history and physicals, doctor's orders, etc.   Bring comfortable clothes that are loose fitting to wear upon discharge. Take into consideration the type of surgery being performed.  Maintain your diet as advised per your physician the day prior to surgery.      Adequate rest the night before surgery is advised.   Park in the Parking lot behind the hospital or in the [facility] across the street from the parking lot. Parking is complimentary.  If you will be discharged the same day as your procedure, please arrange for a responsible adult to drive you home or to accompany you if traveling by taxi.   YOU WILL NOT BE PERMITTED TO DRIVE OR TO LEAVE THE HOSPITAL ALONE AFTER SURGERY.   It is strongly recommended that you arrange for someone to remain with you for the first 24 hrs following your surgery.    The Surgeon will speak to your family/visitor after your surgery regarding the outcome of your surgery and post op care.  The Surgeon may speak to you after your surgery, but there is a possibility you may not remember the details.  Please check with your family members regarding the conversation with the Surgeon.    We strongly recommend whoever is bringing you home be present for discharge instructions.  This will ensure a thorough understanding for your post op home care.    EACH PATIENT IS ALLOWED TWO FAMILY MEMBERS OR VISITORS IN THE ROOM AND IN THE WAITING ROOMS WHILE YOU ARE IN SURGERY. ALL CHILDREN MUST ALWAYS BE ACCOMPANIED BY AN ADULT.    Thank you for your cooperation.  The Staff of [facility].                Bathing Instructions with Hibiclens     Shower the evening before and morning of your procedure with Hibiclens:   Wash your face with water and your regular face wash/soap   Apply Hibiclens directly on your skin or on a  wet washcloth and wash gently. When showering: Move away from the shower stream when applying Hibiclens to avoid rinsing off too soon.   Rinse thoroughly with warm water   Do not dilute Hibiclens         Dry off as usual, do not use any deodorant, powder, body lotions, perfume, after shave or cologne.

## 2020-03-03 ENCOUNTER — TELEPHONE (OUTPATIENT)
Dept: ORTHOPEDICS | Facility: CLINIC | Age: 50
End: 2020-03-03

## 2020-03-03 DIAGNOSIS — Z98.890 POST-OPERATIVE STATE: Primary | ICD-10-CM

## 2020-03-03 PROBLEM — G56.20 CUBITAL TUNNEL SYNDROME: Status: ACTIVE | Noted: 2020-03-03

## 2020-03-03 RX ORDER — HYDROCODONE BITARTRATE AND ACETAMINOPHEN 5; 325 MG/1; MG/1
1 TABLET ORAL EVERY 6 HOURS PRN
Qty: 20 TABLET | Refills: 0 | Status: SHIPPED | OUTPATIENT
Start: 2020-03-03 | End: 2020-03-10 | Stop reason: SDUPTHER

## 2020-03-03 NOTE — TELEPHONE ENCOUNTER
Informed patient to be at the surgery center 3/4/20 for    5:30a.m. Also reminded patient of post op appointment on 3/18/20.Patient verbalized understanding.

## 2020-03-04 ENCOUNTER — ANESTHESIA (OUTPATIENT)
Dept: SURGERY | Facility: OTHER | Age: 50
End: 2020-03-04
Payer: COMMERCIAL

## 2020-03-04 ENCOUNTER — HOSPITAL ENCOUNTER (OUTPATIENT)
Facility: OTHER | Age: 50
Discharge: HOME | End: 2020-03-04
Attending: ORTHOPAEDIC SURGERY | Admitting: ORTHOPAEDIC SURGERY
Payer: COMMERCIAL

## 2020-03-04 VITALS
HEART RATE: 65 BPM | SYSTOLIC BLOOD PRESSURE: 126 MMHG | DIASTOLIC BLOOD PRESSURE: 70 MMHG | WEIGHT: 246 LBS | TEMPERATURE: 98 F | OXYGEN SATURATION: 96 % | RESPIRATION RATE: 16 BRPM | BODY MASS INDEX: 30.59 KG/M2 | HEIGHT: 75 IN

## 2020-03-04 DIAGNOSIS — G56.22 CUBITAL TUNNEL SYNDROME ON LEFT: ICD-10-CM

## 2020-03-04 DIAGNOSIS — G56.20 CUBITAL TUNNEL SYNDROME, UNSPECIFIED LATERALITY: Primary | ICD-10-CM

## 2020-03-04 PROCEDURE — 64718 REVISE ULNAR NERVE AT ELBOW: CPT | Mod: LT,,, | Performed by: ORTHOPAEDIC SURGERY

## 2020-03-04 PROCEDURE — 63600175 PHARM REV CODE 636 W HCPCS: Performed by: SPECIALIST

## 2020-03-04 PROCEDURE — 64721 PR REVISE MEDIAN N/CARPAL TUNNEL SURG: ICD-10-PCS | Mod: 51,LT,, | Performed by: ORTHOPAEDIC SURGERY

## 2020-03-04 PROCEDURE — 64721 CARPAL TUNNEL SURGERY: CPT | Mod: 51,LT,, | Performed by: ORTHOPAEDIC SURGERY

## 2020-03-04 PROCEDURE — 63600175 PHARM REV CODE 636 W HCPCS: Performed by: NURSE ANESTHETIST, CERTIFIED REGISTERED

## 2020-03-04 PROCEDURE — 64718 PR REVISE ULNAR NERVE AT ELBOW: ICD-10-PCS | Mod: LT,,, | Performed by: ORTHOPAEDIC SURGERY

## 2020-03-04 PROCEDURE — 63600175 PHARM REV CODE 636 W HCPCS: Performed by: STUDENT IN AN ORGANIZED HEALTH CARE EDUCATION/TRAINING PROGRAM

## 2020-03-04 PROCEDURE — 71000033 HC RECOVERY, INTIAL HOUR: Performed by: ORTHOPAEDIC SURGERY

## 2020-03-04 PROCEDURE — 36000707: Performed by: ORTHOPAEDIC SURGERY

## 2020-03-04 PROCEDURE — 25000003 PHARM REV CODE 250: Performed by: ANESTHESIOLOGY

## 2020-03-04 PROCEDURE — 25000003 PHARM REV CODE 250: Performed by: NURSE ANESTHETIST, CERTIFIED REGISTERED

## 2020-03-04 PROCEDURE — 63600175 PHARM REV CODE 636 W HCPCS: Performed by: ANESTHESIOLOGY

## 2020-03-04 PROCEDURE — 37000009 HC ANESTHESIA EA ADD 15 MINS: Performed by: ORTHOPAEDIC SURGERY

## 2020-03-04 PROCEDURE — 71000039 HC RECOVERY, EACH ADD'L HOUR: Performed by: ORTHOPAEDIC SURGERY

## 2020-03-04 PROCEDURE — 64415 NJX AA&/STRD BRCH PLXS IMG: CPT | Performed by: ANESTHESIOLOGY

## 2020-03-04 PROCEDURE — 37000008 HC ANESTHESIA 1ST 15 MINUTES: Performed by: ORTHOPAEDIC SURGERY

## 2020-03-04 PROCEDURE — 36000706: Performed by: ORTHOPAEDIC SURGERY

## 2020-03-04 PROCEDURE — 71000015 HC POSTOP RECOV 1ST HR: Performed by: ORTHOPAEDIC SURGERY

## 2020-03-04 PROCEDURE — 76942 ECHO GUIDE FOR BIOPSY: CPT | Performed by: ANESTHESIOLOGY

## 2020-03-04 DEVICE — MATRIX REGENERATIVE CLARIX FLO: Type: IMPLANTABLE DEVICE | Site: ELBOW | Status: FUNCTIONAL

## 2020-03-04 RX ORDER — MEPERIDINE HYDROCHLORIDE 25 MG/ML
12.5 INJECTION INTRAMUSCULAR; INTRAVENOUS; SUBCUTANEOUS ONCE AS NEEDED
Status: DISCONTINUED | OUTPATIENT
Start: 2020-03-04 | End: 2020-03-04 | Stop reason: HOSPADM

## 2020-03-04 RX ORDER — LIDOCAINE HYDROCHLORIDE 10 MG/ML
0.5 INJECTION, SOLUTION EPIDURAL; INFILTRATION; INTRACAUDAL; PERINEURAL ONCE
Status: DISCONTINUED | OUTPATIENT
Start: 2020-03-04 | End: 2020-03-04 | Stop reason: HOSPADM

## 2020-03-04 RX ORDER — ONDANSETRON 8 MG/1
8 TABLET, ORALLY DISINTEGRATING ORAL EVERY 8 HOURS PRN
Status: DISCONTINUED | OUTPATIENT
Start: 2020-03-04 | End: 2020-03-04 | Stop reason: HOSPADM

## 2020-03-04 RX ORDER — PHENYLEPHRINE HYDROCHLORIDE 10 MG/ML
INJECTION INTRAVENOUS
Status: DISCONTINUED | OUTPATIENT
Start: 2020-03-04 | End: 2020-03-04

## 2020-03-04 RX ORDER — PREGABALIN 50 MG/1
50 CAPSULE ORAL ONCE
Status: COMPLETED | OUTPATIENT
Start: 2020-03-04 | End: 2020-03-04

## 2020-03-04 RX ORDER — GLYCOPYRROLATE 0.2 MG/ML
INJECTION INTRAMUSCULAR; INTRAVENOUS
Status: DISCONTINUED | OUTPATIENT
Start: 2020-03-04 | End: 2020-03-04

## 2020-03-04 RX ORDER — MIDAZOLAM HYDROCHLORIDE 1 MG/ML
INJECTION INTRAMUSCULAR; INTRAVENOUS
Status: DISCONTINUED | OUTPATIENT
Start: 2020-03-04 | End: 2020-03-04

## 2020-03-04 RX ORDER — SODIUM CHLORIDE 9 MG/ML
INJECTION, SOLUTION INTRAVENOUS CONTINUOUS
Status: DISCONTINUED | OUTPATIENT
Start: 2020-03-04 | End: 2020-03-04 | Stop reason: HOSPADM

## 2020-03-04 RX ORDER — OXYCODONE HYDROCHLORIDE 5 MG/1
5 TABLET ORAL EVERY 4 HOURS PRN
Status: DISCONTINUED | OUTPATIENT
Start: 2020-03-04 | End: 2020-03-04 | Stop reason: HOSPADM

## 2020-03-04 RX ORDER — ONDANSETRON 2 MG/ML
INJECTION INTRAMUSCULAR; INTRAVENOUS
Status: DISCONTINUED | OUTPATIENT
Start: 2020-03-04 | End: 2020-03-04

## 2020-03-04 RX ORDER — ACETAMINOPHEN 500 MG
1000 TABLET ORAL
Status: COMPLETED | OUTPATIENT
Start: 2020-03-04 | End: 2020-03-04

## 2020-03-04 RX ORDER — LIDOCAINE HCL/PF 100 MG/5ML
SYRINGE (ML) INTRAVENOUS
Status: DISCONTINUED | OUTPATIENT
Start: 2020-03-04 | End: 2020-03-04

## 2020-03-04 RX ORDER — CEFAZOLIN SODIUM 1 G/3ML
2 INJECTION, POWDER, FOR SOLUTION INTRAMUSCULAR; INTRAVENOUS
Status: COMPLETED | OUTPATIENT
Start: 2020-03-04 | End: 2020-03-04

## 2020-03-04 RX ORDER — DEXAMETHASONE SODIUM PHOSPHATE 4 MG/ML
INJECTION, SOLUTION INTRA-ARTICULAR; INTRALESIONAL; INTRAMUSCULAR; INTRAVENOUS; SOFT TISSUE
Status: DISCONTINUED | OUTPATIENT
Start: 2020-03-04 | End: 2020-03-04

## 2020-03-04 RX ORDER — SODIUM CHLORIDE 0.9 % (FLUSH) 0.9 %
10 SYRINGE (ML) INJECTION
Status: DISCONTINUED | OUTPATIENT
Start: 2020-03-04 | End: 2020-03-04 | Stop reason: HOSPADM

## 2020-03-04 RX ORDER — FENTANYL CITRATE 50 UG/ML
INJECTION, SOLUTION INTRAMUSCULAR; INTRAVENOUS
Status: DISCONTINUED | OUTPATIENT
Start: 2020-03-04 | End: 2020-03-04

## 2020-03-04 RX ORDER — HYDROMORPHONE HYDROCHLORIDE 2 MG/ML
0.4 INJECTION, SOLUTION INTRAMUSCULAR; INTRAVENOUS; SUBCUTANEOUS EVERY 5 MIN PRN
Status: DISCONTINUED | OUTPATIENT
Start: 2020-03-04 | End: 2020-03-04 | Stop reason: HOSPADM

## 2020-03-04 RX ORDER — ONDANSETRON 2 MG/ML
4 INJECTION INTRAMUSCULAR; INTRAVENOUS DAILY PRN
Status: DISCONTINUED | OUTPATIENT
Start: 2020-03-04 | End: 2020-03-04 | Stop reason: HOSPADM

## 2020-03-04 RX ORDER — PROPOFOL 10 MG/ML
VIAL (ML) INTRAVENOUS
Status: DISCONTINUED | OUTPATIENT
Start: 2020-03-04 | End: 2020-03-04

## 2020-03-04 RX ORDER — SODIUM CHLORIDE 0.9 % (FLUSH) 0.9 %
3 SYRINGE (ML) INJECTION
Status: DISCONTINUED | OUTPATIENT
Start: 2020-03-04 | End: 2020-03-04 | Stop reason: HOSPADM

## 2020-03-04 RX ORDER — SODIUM CHLORIDE, SODIUM LACTATE, POTASSIUM CHLORIDE, CALCIUM CHLORIDE 600; 310; 30; 20 MG/100ML; MG/100ML; MG/100ML; MG/100ML
INJECTION, SOLUTION INTRAVENOUS CONTINUOUS
Status: DISCONTINUED | OUTPATIENT
Start: 2020-03-04 | End: 2020-03-04 | Stop reason: HOSPADM

## 2020-03-04 RX ORDER — ACETAMINOPHEN 325 MG/1
650 TABLET ORAL EVERY 4 HOURS PRN
Status: DISCONTINUED | OUTPATIENT
Start: 2020-03-04 | End: 2020-03-04 | Stop reason: HOSPADM

## 2020-03-04 RX ORDER — ROPIVACAINE HYDROCHLORIDE 5 MG/ML
INJECTION, SOLUTION EPIDURAL; INFILTRATION; PERINEURAL
Status: DISCONTINUED | OUTPATIENT
Start: 2020-03-04 | End: 2020-03-04

## 2020-03-04 RX ORDER — OXYCODONE HYDROCHLORIDE 5 MG/1
5 TABLET ORAL
Status: DISCONTINUED | OUTPATIENT
Start: 2020-03-04 | End: 2020-03-04 | Stop reason: HOSPADM

## 2020-03-04 RX ORDER — OXYCODONE HYDROCHLORIDE 5 MG/1
10 TABLET ORAL EVERY 4 HOURS PRN
Status: DISCONTINUED | OUTPATIENT
Start: 2020-03-04 | End: 2020-03-04 | Stop reason: HOSPADM

## 2020-03-04 RX ADMIN — OXYCODONE HYDROCHLORIDE 5 MG: 5 TABLET ORAL at 10:03

## 2020-03-04 RX ADMIN — CEFAZOLIN 2 G: 330 INJECTION, POWDER, FOR SOLUTION INTRAMUSCULAR; INTRAVENOUS at 09:03

## 2020-03-04 RX ADMIN — GLYCOPYRROLATE 0.2 MG: 0.2 INJECTION, SOLUTION INTRAMUSCULAR; INTRAVENOUS at 09:03

## 2020-03-04 RX ADMIN — LIDOCAINE HYDROCHLORIDE 100 MG: 20 INJECTION, SOLUTION INTRAVENOUS at 08:03

## 2020-03-04 RX ADMIN — ROPIVACAINE HYDROCHLORIDE 30 ML: 5 INJECTION, SOLUTION EPIDURAL; INFILTRATION; PERINEURAL at 10:03

## 2020-03-04 RX ADMIN — FENTANYL CITRATE 100 MCG: 50 INJECTION, SOLUTION INTRAMUSCULAR; INTRAVENOUS at 08:03

## 2020-03-04 RX ADMIN — DEXAMETHASONE SODIUM PHOSPHATE 4 MG: 4 INJECTION, SOLUTION INTRAMUSCULAR; INTRAVENOUS at 09:03

## 2020-03-04 RX ADMIN — PREGABALIN 50 MG: 50 CAPSULE ORAL at 06:03

## 2020-03-04 RX ADMIN — PROPOFOL 200 MG: 10 INJECTION, EMULSION INTRAVENOUS at 08:03

## 2020-03-04 RX ADMIN — MIDAZOLAM HYDROCHLORIDE 2 MG: 1 INJECTION, SOLUTION INTRAMUSCULAR; INTRAVENOUS at 08:03

## 2020-03-04 RX ADMIN — PHENYLEPHRINE HYDROCHLORIDE 100 MCG: 10 INJECTION INTRAVENOUS at 09:03

## 2020-03-04 RX ADMIN — ACETAMINOPHEN 1000 MG: 500 TABLET, FILM COATED ORAL at 06:03

## 2020-03-04 RX ADMIN — SODIUM CHLORIDE, SODIUM LACTATE, POTASSIUM CHLORIDE, AND CALCIUM CHLORIDE: 600; 310; 30; 20 INJECTION, SOLUTION INTRAVENOUS at 10:03

## 2020-03-04 RX ADMIN — ONDANSETRON 4 MG: 2 INJECTION INTRAMUSCULAR; INTRAVENOUS at 09:03

## 2020-03-04 RX ADMIN — SODIUM CHLORIDE, SODIUM LACTATE, POTASSIUM CHLORIDE, AND CALCIUM CHLORIDE: 600; 310; 30; 20 INJECTION, SOLUTION INTRAVENOUS at 08:03

## 2020-03-04 NOTE — TRANSFER OF CARE
"Anesthesia Transfer of Care Note    Patient: Michael Lowe    Procedure(s) Performed: Procedure(s) (LRB):  DECOMPRESSION, NERVE, ULNAR, LEFT ELBOW (Left)  RELEASE, CARPAL TUNNEL (Left)    Patient location: PACU    Anesthesia Type: general    Transport from OR: Transported from OR on 2-3 L/min O2 by NC with adequate spontaneous ventilation    Post pain: adequate analgesia    Post assessment: no apparent anesthetic complications    Post vital signs: stable    Level of consciousness: awake and alert    Nausea/Vomiting: no nausea/vomiting    Complications: none    Transfer of care protocol was followed      Last vitals:   Visit Vitals  /73 (BP Location: Left arm, Patient Position: Sitting)   Pulse 65   Temp 36.6 °C (97.9 °F) (Oral)   Resp 16   Ht 6' 3" (1.905 m)   Wt 111.6 kg (246 lb)   SpO2 98%   BMI 30.75 kg/m²     "

## 2020-03-04 NOTE — ANESTHESIA PROCEDURE NOTES
Left supraclavicular    Patient location during procedure: post-op   Block not for primary anesthetic.  Reason for block: at surgeon's request and post-op pain management   Post-op Pain Location: Left elbow     Staffing  Authorizing Provider: Nazario Bird MD  Performing Provider: Wilson Chase MD    Preanesthetic Checklist  Completed: patient identified, site marked, surgical consent, pre-op evaluation, timeout performed, IV checked, risks and benefits discussed and monitors and equipment checked  Peripheral Block  Patient position: right lateral decubitus  Prep: ChloraPrep and site prepped and draped  Patient monitoring: heart rate, cardiac monitor, continuous pulse ox and frequent blood pressure checks  Block type: interscalene  Laterality: left  Injection technique: single shot  Needle  Needle type: Echogenic   Needle gauge: 21 G  Needle length: 4 in  Needle localization: ultrasound guidance and anatomical landmarks   -ultrasound image captured on disc.  Assessment  Injection assessment: negative aspiration, negative parasthesia and local visualized surrounding nerve  Paresthesia pain: none  Heart rate change: no  Slow fractionated injection: yes  Additional Notes  Checked postop by Dr Austin Mendoza

## 2020-03-04 NOTE — ANESTHESIA POSTPROCEDURE EVALUATION
Anesthesia Post Evaluation    Patient: Michael Lowe    Procedure(s) Performed: Procedure(s) (LRB):  DECOMPRESSION, NERVE, ULNAR, LEFT ELBOW (Left)  RELEASE, CARPAL TUNNEL (Left)    Final Anesthesia Type: general    Patient location during evaluation: PACU  Patient participation: Yes- Able to Participate  Level of consciousness: awake and alert  Post-procedure vital signs: reviewed and stable  Pain management: adequate  Airway patency: patent    PONV status at discharge: No PONV  Anesthetic complications: no      Cardiovascular status: blood pressure returned to baseline  Respiratory status: unassisted and room air  Hydration status: euvolemic  Follow-up not needed.          Vitals Value Taken Time   /79 3/4/2020 11:15 AM   Temp 36.6 °C (97.9 °F) 3/4/2020 11:15 AM   Pulse 72 3/4/2020 11:15 AM   Resp 16 3/4/2020 11:15 AM   SpO2 95 % 3/4/2020 11:15 AM         Event Time     Out of Recovery 11:09:46          Pain/Wilber Score: Pain Rating Prior to Med Admin: 5 (3/4/2020 10:33 AM)  Pain Rating Post Med Admin: 0 (3/4/2020 10:50 AM)  Wilber Score: 10 (3/4/2020 11:15 AM)

## 2020-03-04 NOTE — OP NOTE
[facility]  Surgery Department  Operative Note    SUMMARY     Date of Procedure: 3/4/2020     Procedure: Procedure(s) (LRB):  DECOMPRESSION, NERVE, ULNAR, LEFT ELBOW (Left)  RELEASE, CARPAL TUNNEL (Left)     Surgeon(s) and Role:     * Mackenzie Harden MD - Primary     * Janet Rosario MD - Resident - Assisting        Pre-Operative Diagnosis: Ulnar nerve compression, left [G56.22]    Post-Operative Diagnosis: Post-Op Diagnosis Codes:     * Ulnar nerve compression, left [G56.22]    Anesthesia: General    Technical Procedures Used: surgery    Description of the Findings of the Procedure:  Indication for procedure this Mr. Lowe is a 49-year-old male with numbness and tingling into his left side. Patient had a history right-sided ulnar nerve and carpal tunnel compression he had undergone a surgery in the past by another surgeon attendant to progress he underwent a revision surgery did better with pain however the significant muscle loss atrophy he had obtained prior to the surgeries were significant he has almost a claw hand after much discussion would not desire that to occur with his left hand he did have numbness and tingling EMG nerve conduction study was performed after much discussion with the patient elected for surgical intervention risks and benefits were explained to the patient in clinic consents were signed in clinic    Procedure in detail the correct site was marked with the patient's participation the holding area the patient was brought to the operating placed supine position underwent general anesthesia left upper extremity was prepped draped normal sterile fashion sterile tourniquet well-padded position was placed on the left upper extremity a time-out was conducted for the correct procedure to be indicated IV antibiotics were given to the patient preoperatively incision was marked out just posterior to the medial epicondyle as well as using Macario's cardinal lines in the  carpal tunnel the arm was exsanguinated with an Esmarch tourniquet was insufflated 250 mmHg attention was 1st turned to the ulnar nerve incision was made careful dissection around the medial antebrachial cutaneous nerve was performed the ulnar nerve was identified was very fatty in nature very compressed specially at the level of the cubital tunnel itself it was not a nice white tubular structure it again was very compressed and hypervascular decompression was conducted once that was completed skin hemostat was passed over the ulnar nerve to confirm a complete release proximally and distally to the cubital tunnel as well as through the cubital tunnel the elbow was placed through range of motion showed the nerve did not sublux the areas irrigated copious amounts normal saline clean clear X injectable placed Vicryl Monocryl Dermabond closed the skin our attention was then turned to the carpal tunnel incision was made careful dissection down to the palmar fascia was identified palmar fascia was sharply incised the transverse ligament was identified and sharply incised the skin hemostat was passed on the undersurface the dressing as 1st ligament proximally and distally to free it from the median nerve Metzenbaum scissors were used to cut the to transverse ligament proximally distally a mosquito hemostat was passed once again to confirm complete release the areas irrigated copious amounts normal saline nylon closed the skin tourniquet was deflated brisk cap refill sued sterile dressing was applied patient was placed in a long-arm posterior splint tolerated suture was brought to cover area in stable condition    Postop plans patient keep the dressing clean dry and intact will see the patient back in 2 weeks time therapy to be initiated    Patient was assessed postoperatively showed she was neurovascularly intact both motor and sensation        Significant Surgical Tasks Conducted by the Assistant(s), if Applicable:  retraction    Complications: No    Estimated Blood Loss (EBL): * No values recorded between 3/4/2020  9:26 AM and 3/4/2020  9:59 AM *           Implants:   Implant Name Type Inv. Item Serial No.  Lot No. LRB No. Used   [id number]  [employer] LIZ [id number] [employer] [id number] 1       Specimens:   Specimen (12h ago, onward)    None                  Condition: Good    Disposition: PACU - hemodynamically stable.    Attestation: I performed the procedure.    Discharge Note    SUMMARY     Admit Date: 3/4/2020    Discharge Date and Time: No discharge date for patient encounter.    Hospital Course (synopsis of major diagnoses, care, treatment, and services provided during the course of the hospital stay): surgery     Final Diagnosis: Post-Op Diagnosis Codes:     * Ulnar nerve compression, left [G56.22]    Disposition: Home or Self Care    Follow Up/Patient Instructions:     Medications:  Reconciled Home Medications:      Medication List      ASK your doctor about these medications    atorvastatin 80 MG tablet  Commonly known as:  LIPITOR  Take 1 tablet (80 mg total) by mouth once daily.     Fish Oil 1,000 mg Cap  Generic drug:  omega-3 fatty acids-vitamin E  Take 1 capsule by mouth once daily.     HYDROcodone-acetaminophen 5-325 mg per tablet  Commonly known as:  NORCO  Take 1 tablet by mouth every 6 (six) hours as needed for Pain.     lysine 500 mg Tab  Commonly known as:  L-LYSINE  Take 1 tablet by mouth once daily.     milk thistle 175 mg tablet  Take 175 mg by mouth once daily.     TART CHERRY ORAL  Take by mouth.          No discharge procedures on file.

## 2020-03-04 NOTE — OR NURSING
Notified OR that pt is consented for left CTR as well as left ulnar decompression.  Only posted for ulnar decompression.

## 2020-03-04 NOTE — INTERVAL H&P NOTE
The patient has been examined and the H&P has been reviewed:    I concur with the findings and no changes have occurred since H&P was written.    Anesthesia/Surgery risks, benefits and alternative options discussed and understood by patient/family.          Active Hospital Problems    Diagnosis  POA    Cubital tunnel syndrome [G56.20]  Unknown      Resolved Hospital Problems   No resolved problems to display.

## 2020-03-04 NOTE — DISCHARGE INSTRUCTIONS

## 2020-03-04 NOTE — BRIEF OP NOTE
[facility]  Brief Operative Note    Surgery Date: 3/4/2020     Surgeon(s) and Role:     * Mackenzie Harden MD - Primary     * Janet Rosario MD - Resident - Assisting        Pre-op Diagnosis:  Ulnar nerve compression, left [G56.22]    Post-op Diagnosis:  Post-Op Diagnosis Codes:     * Ulnar nerve compression, left [G56.22]    Procedure(s) (LRB):  DECOMPRESSION, NERVE, ULNAR, LEFT ELBOW (Left)  RELEASE, CARPAL TUNNEL (Left)    Anesthesia: General    Description of the findings of the procedure(s): L CTR and L CuTR    Estimated Blood Loss: * No values recorded between 3/4/2020  9:26 AM and 3/4/2020 10:08 AM *         Specimens:   Specimen (12h ago, onward)    None            Discharge Note    OUTCOME: Patient tolerated treatment/procedure well without complication and is now ready for discharge.    DISPOSITION: Home or Self Care    FINAL DIAGNOSIS:  Cubital tunnel syndrome    FOLLOWUP: In clinic    DISCHARGE INSTRUCTIONS:    Discharge Procedure Orders   Diet general     Call MD for:  temperature >100.4     Call MD for:  persistent nausea and vomiting     Call MD for:  severe uncontrolled pain     Call MD for:  difficulty breathing, headache or visual disturbances     Call MD for:  redness, tenderness, or signs of infection (pain, swelling, redness, odor or green/yellow discharge around incision site)     Call MD for:  hives     Call MD for:  persistent dizziness or light-headedness     Call MD for:  extreme fatigue     Leave dressing on - Keep it clean, dry, and intact until clinic visit

## 2020-03-09 ENCOUNTER — PATIENT MESSAGE (OUTPATIENT)
Dept: ORTHOPEDICS | Facility: CLINIC | Age: 50
End: 2020-03-09

## 2020-03-10 ENCOUNTER — PATIENT MESSAGE (OUTPATIENT)
Dept: ORTHOPEDICS | Facility: CLINIC | Age: 50
End: 2020-03-10

## 2020-03-10 DIAGNOSIS — Z98.890 POST-OPERATIVE STATE: ICD-10-CM

## 2020-03-10 RX ORDER — HYDROCODONE BITARTRATE AND ACETAMINOPHEN 5; 325 MG/1; MG/1
1 TABLET ORAL EVERY 6 HOURS PRN
Qty: 20 TABLET | Refills: 0 | Status: SHIPPED | OUTPATIENT
Start: 2020-03-10 | End: 2021-06-25

## 2020-03-12 ENCOUNTER — OFFICE VISIT (OUTPATIENT)
Dept: ORTHOPEDICS | Facility: CLINIC | Age: 50
End: 2020-03-12
Payer: COMMERCIAL

## 2020-03-12 DIAGNOSIS — Z98.890 POST-OPERATIVE STATE: Primary | ICD-10-CM

## 2020-03-12 PROCEDURE — 99024 PR POST-OP FOLLOW-UP VISIT: ICD-10-PCS | Mod: S$GLB,,, | Performed by: PHYSICIAN ASSISTANT

## 2020-03-12 PROCEDURE — 99024 POSTOP FOLLOW-UP VISIT: CPT | Mod: S$GLB,,, | Performed by: PHYSICIAN ASSISTANT

## 2020-03-12 PROCEDURE — 99999 PR PBB SHADOW E&M-EST. PATIENT-LVL II: ICD-10-PCS | Mod: PBBFAC,,, | Performed by: PHYSICIAN ASSISTANT

## 2020-03-12 PROCEDURE — 99999 PR PBB SHADOW E&M-EST. PATIENT-LVL II: CPT | Mod: PBBFAC,,, | Performed by: PHYSICIAN ASSISTANT

## 2020-03-12 NOTE — PROGRESS NOTES
Pt is s/p left ulnar nerve decompression and left carpal tunnel release. He presents due to loosening of the distal bandage with some of the gauze falling out. He reports he did bump the wrist a couple of days ago which caused a bit of increased pain.     Splint removed. Incisions with sutures in place, healing well. New long arm plaster splint applied.     RTC at regular post op visit.

## 2020-03-17 NOTE — PROGRESS NOTES
"Mr. Lowe is here today for a post-operative visit.  He is 14 days status post left carpal tunnel release and left ulnar nerve decompression at the elbow by Dr. Harden on 3/4/20. He reports that he is doing well.  Pain is intermittent, improving.  He is taking pain medication as needed.  He denies fever, chills, and sweats since the time of the surgery.     Physical exam:    Vitals:    03/18/20 0911   BP: 118/82   Pulse: 72   Weight: 111.6 kg (246 lb)   Height: 6' 3" (1.905 m)   PainSc: 0-No pain     Vital signs are stable, patient is afebrile.  Patient is well dressed and well groomed, no acute distress.  Alert and oriented to person, place, and time.  Post op dressing taken down.  Incision is clean, dry and intact.  There is no erythema or exudate.  There is no sign of any infection. He is NVI. Sutures removed without difficulty. Good ROM elbow, wrist, and fingers.     Assessment: status post left carpal tunnel release and left ulnar nerve decompression at the elbow by Dr. Harden on 3/4/20    Plan:  Michael was seen today for post-op evaluation.    Diagnoses and all orders for this visit:    Post-operative state    - PO instruction reviewed and provided to patient  -left gel brace applied to wrist and ACE wrap to elbow   -pt to attend OT today   -RTC if needed     Anna Sullivan PA-C  [facility]   [facility] [place], [place]    Of note, patient treated during COVID-19 pandemic.    "

## 2020-03-18 ENCOUNTER — OFFICE VISIT (OUTPATIENT)
Dept: ORTHOPEDICS | Facility: CLINIC | Age: 50
End: 2020-03-18
Payer: COMMERCIAL

## 2020-03-18 ENCOUNTER — CLINICAL SUPPORT (OUTPATIENT)
Dept: REHABILITATION | Facility: HOSPITAL | Age: 50
End: 2020-03-18
Payer: COMMERCIAL

## 2020-03-18 VITALS
HEIGHT: 75 IN | BODY MASS INDEX: 30.59 KG/M2 | WEIGHT: 246 LBS | DIASTOLIC BLOOD PRESSURE: 82 MMHG | SYSTOLIC BLOOD PRESSURE: 118 MMHG | HEART RATE: 72 BPM

## 2020-03-18 DIAGNOSIS — M79.642 LEFT HAND PAIN: ICD-10-CM

## 2020-03-18 DIAGNOSIS — Z98.890 POST-OPERATIVE STATE: ICD-10-CM

## 2020-03-18 DIAGNOSIS — Z98.890 POST-OPERATIVE STATE: Primary | ICD-10-CM

## 2020-03-18 DIAGNOSIS — M25.522 ELBOW PAIN, LEFT: ICD-10-CM

## 2020-03-18 PROCEDURE — 97165 OT EVAL LOW COMPLEX 30 MIN: CPT

## 2020-03-18 PROCEDURE — 97110 THERAPEUTIC EXERCISES: CPT

## 2020-03-18 PROCEDURE — 99999 PR PBB SHADOW E&M-EST. PATIENT-LVL III: CPT | Mod: PBBFAC,,, | Performed by: PHYSICIAN ASSISTANT

## 2020-03-18 PROCEDURE — 99999 PR PBB SHADOW E&M-EST. PATIENT-LVL III: ICD-10-PCS | Mod: PBBFAC,,, | Performed by: PHYSICIAN ASSISTANT

## 2020-03-18 PROCEDURE — 99024 POSTOP FOLLOW-UP VISIT: CPT | Mod: S$GLB,,, | Performed by: PHYSICIAN ASSISTANT

## 2020-03-18 PROCEDURE — 99024 PR POST-OP FOLLOW-UP VISIT: ICD-10-PCS | Mod: S$GLB,,, | Performed by: PHYSICIAN ASSISTANT

## 2020-03-18 NOTE — PROGRESS NOTES
Occupational Therapy -Hand / Wrist  Evaluation       [facility] Occupational Therapy  Initial Hand Evaluation     Date: 3/18/2020  Name: Michael Lowe  Clinic Number: [id number]    Medical Diagnosis: Left CTR and ulnar nerve decompression at elbow 3/4/2020  Therapy Diagnosis:   Encounter Diagnoses   Name Primary?    Post-operative state     Left hand pain     Elbow pain, left      Physician: Anna Sullivan PA-C    Physician Orders:eval and treat     Surgical Procedure and Date: 3/4/20 - CTR, ulnar nerve decompression at elbow   Evaluation Date: 3/18/2020    Plan of Care Certification Period:5/18/2020  Date of Return to MD: as needed     Visit # / Visits authorized: 1 / 12  Insurance Authorization Period Expiration: pending  No foto     Time In:10 am   Time Out: 1045 am   Total Billable Time: 45  minutes    Precautions:  Standard    Subjective     Involved Side: left   Dominant Side: Right  Date of Onset: 3/4/2020  Mechanism of Injury: repetitive use   History of Current Condition: He is 14 days status post left carpal tunnel release and left ulnar nerve decompression at the elbow by Dr. Harden on 3/4/20.   Imaging:  See chart   Previous Therapy: OT for right ulnar neuropathy/ median/ulnar nerve palsy 2014, 2015, 2018 -2019    Past Medical History/Physical Systems Review:   Michael Lowe  has a past medical history of Carpal tunnel syndrome of right wrist, Cervical disc herniation, Hyperlipidemia, and Low back pain.    Michael Lowe  has a past surgical history that includes right carpal tunnel release (10-23-13); Carpal tunnel release (Right, 1/4/2019); Release of ulnar nerve at cubital tunnel (Right, 1/4/2019); and Carpal tunnel release (Left, 3/4/2020).    Michael has a current medication list which includes the following prescription(s): atorvastatin, c/sourcherry/celery/grape seed, hydrocodone-acetaminophen, lysine, milk thistle, and omega-3 fatty acids-vitamin e, and the  following Facility-Administered Medications: mupirocin.    Review of patient's allergies indicates:   Allergen Reactions    No known drug allergies         Patient's Goals for Therapy: regain use LUE     Pain:  Functional Pain Scale Rating 0-10:   3/10 on average  0/10 at best  5/10 at worst  Location: left volar wrist and elbow incision   Description: Aching, Tight and sore   Aggravating Factors: Bending  Easing Factors: rest    Occupation: Occupation:  Self-Employed, Fabricated outdoor furniture   Working presently:  yes  Workmen's Compensation:  no    Functional Limitations/Social History:    Previous functional status includes: Independent with all ADLs.     Current FunctionalStatus   Home/Living environment : lives with their spouse      Limitation of Functional Status as follows:   ADLs/IADLs:     - Feeding: IND     - Bathing/ Hygiene: IND     - Dressing/Grooming: IND     - Driving: limited use for gripping     - writing / typing IND     - /pinch limited use for gripping, holding things, lifting/carrying, tool use     - work activities: tool use, gripping, carrying, holding things.      Leisure: woodworking         Objective     Observation/Appearance:  No deformities noted        Sensation:   Slightly diminished in median / ulnar distribution, not formally assessed     Wound/Scar:   Patient with 3 cm volar wrist and 5 cm posterior elbow incision, stitches removed this date; no significant hypersensitivity     Edema. Measured in centimeters.   None     Hand ROM. Measured in degrees.     wnl for LUE          Manual Muscle Testing   BICEPS 3+/5   BRACHIALIS 3+/5  BRACHIORADIALIS 3+/5   FCR/FCU 3/5     Special Tests:  NT      PATIENT ASSESSED PRE-SURGERY AS FOLLOWS:    Strength (Dyanmometer) and Pinch Strength (Pinch Gauge)  Measured in pounds and psi. Average of three trials.   3/18/2020 3/18/2020    RIGHT LEFT    Rung II 68  126    Rung III  75  135    Key Pinch 8 20    3pt Pinch nt 15    2pt Pinch 5  15    * Unable to assess right 3 pt pinch due to median/ulnar neuropathy, atrophy              Treatment     Treatment Time In:1045 am   Treatment Time Out: 11 am   Total Treatment time separate from Evaluation time:15 min    Michael received therapeutic exercises for 15 minutes including:  -ulnar nerve glides, post op positions 1-3 in limited, pain free range x 10 reps each, 2-3 x daily   - tendon glides including hook, wave and roll and press x 10 reps to promote tendon excursion thru scar adhesion   - median nerve glides x 10 reps  - scar massage x 5 min 1-2 x daily   - modality use for pain, swelling, stiffness     Home Exercise Program/Education:  Issued HEP (see patient instructions in EMR) and educated on  use of hot/cold modality use for pain, stiffness and edema management . Exercises were reviewed and Michael was able to demonstrate them prior to the end of the session.   Pt received a written copy of exercises to perform at home. Michael demonstrated good  understanding of the education provided.  Pt was advised to perform these exercises with minimal pain/discomfort of 3-4 out of 10 at worse, discontinue if pain worsens.    Patient/Family Education: role of OT, goals for OT, scheduling/cancellations - pt verbalized understanding. Discussed insurance limitations with patient.    Additional Education provided: per above    Assessment     Michael Lowe is a 49 y.o. year old referred to outpatient occupational therapy and presents with a medical diagnosis of left CTR and Ulnar nerve decompression , resulting in Decreased  strength, Decreased pinch strength, Decreased muscle strength, Decreased functional hand use, Increased pain, Joint Stiffness, Scar Adhesions and Diminished/Impaired Sensation and demonstrates limitations as described in the chart below.   Following medical record review it is determined that pt will benefit from occupational therapy services in order to maximize pain free and/or functional  use of left hand/UE   The following goals were discussed with the patient and patient is in agreement with them as to be addressed in the treatment plan. The patient's rehab potential is Good.     Anticipated barriers to occupational therapy: None   Pt has no cultural, educational or language barriers to learning provided.    Profile and History Assessment of Occupational Performance Level of Clinical Decision Making Complexity Score   Occupational Profile:   Michael Lowe is a 49 y.o. male who lives with their spouse and is currently self-employed as Fabricator of outdoor woodwork/furniture . Michael Lowe has difficulty with    driving/transportation management, phone/computer use, housework/household chores and gripping, lifting, carrying, tool use,   affecting his/her daily functional abilities. His/her main goal for therapy is regain use of LUE .     Comorbidities:   Carpal tunnel syndrome of right wrist, Cervical disc herniation, Hyperlipidemia, and Low back pain.    Medical and Therapy History Review:   Brief               Performance Deficits    Physical:  Muscle Power/Strength  Muscle Endurance  Skin Integrity/Scar Formation   Strength  Pinch Strength  Gross Motor Coordination  Fine Motor Coordination  Pain    Cognitive:  No Deficits    Psychosocial:    Habits  Routines     Clinical Decision Making:  low    Assessment Process:  Problem-Focused Assessments    Modification/Need for Assistance:  Not Necessary    Intervention Selection:  Limited Treatment Options       Low   Based on PMHX, co morbidities , data from assessments and functional level of assistance required with task and clinical presentation directly impacting function.       The following goals were discussed with the patient and patient is in agreement with them as to be addressed in the treatment plan.       Goals:   Short Term Goals (4 weeks)   1)  Patient to be IND with HEP and modalities for pain management  2)   Increase  " strength 2-4 lbs. For gripping tools, lifting wood  3)  Increase pinch 1-3 psi's for typing, computer work, FM activities    Long Term Goals (8 weeks)   1)  Increase  strength 5-8 lbs. For tool use, gripping, carrying, lifting furniture   2)  Increase pinch strength 4-6  psi's for FM activities and tool use      Plan   Recommend continued Outpatient Occupataional Therapy  1x week for 8 weeks to include the following interventions Paraffin, Manual therapy/joint mobilizations, Modalities for pain management, US 3 mhz, Therapeutic exercises/activities., Strengthening, Edema Control, Scar Management, Wound Care and Electrical Modalities.    Within the Certification Period/Plan of care expiration: 3/18/2020 to 2020.      Linn Gallo OT. CLARISSE                       Occupation:  Self-Employed, Fabricated outdoor furniture   Working presently:  yes  Workmen's Compensation:  no     Date of onset:   Involved area:  l hand distal muscle weakness  Mechanism of Injury:   R CTS and ulnar nerve decompression on 10/23/13  Past Medical History/Physical Systems Review: CAD;      Environmental Concerns/ Fall Risk:  None  Barriers to Learning: None   Cultural/Spiritual : None   Developmental/Education: None   Abuse/ Neglect: none   Nutritional Deficit: None   Language: None   Hearing/Vision Deficit: None   Other:      Subjective:  Patient was treated previously as a patient for this condition from 2014 to 2015 .  He returns to clinic today for re-evaluation to assess current condition and to compare to prior assessment prior to possible surgical consult by Dr. Austin Mendoza.     Pt reports I have trouble with coordination, pinching things, and my strength is weak"      Pain   At rest: 0 out of 10  With work/ Activity: 0 out of 10  Sleepin out of 10  Location of Pain : No pain reported      Patients goals for therapy are:  I'd like to get my hand working better, I need more strength in my hand     Objective: "   Observation  :atrophy web space, intrinsic muscle weakness     Sensation: No significant sensory impairment ulnar/median nerves  Scar / Wound: old scar from previous CTR, cubital tunnel release   Edema:  None         Range of Motion:   WNL  + Tiffany's sign, + Froment's sign                                            Manual Muscle Test:       Atrophy of 1st web space, minimal ape/claw hand secondary to ulnar/median palsy and intrinsic weakness                                          Fine motor coordination:  Requires increase time to complete grooved pegboard activity with compensatory pinch postures  In hand manipulation wfl's.      Functional Limitations:   Self Care / ADL: Limited use of R hand for ADLs, grooming, hygiene, gripping/pinching   Work/Activities: Limited use of R hand  for gripping, pinching, hand weakness, holding tools  Leisure: Limited use of R hand  For tool use, pinching, gripping      Treatment Included:   OT evaluation and reviewed  HEP including  Red theraputty for lumbrical squeeze, key pinch, gross sustained , thumb adduction; AROM for intrinsic minus, finger ab/ad, opposition, thumb flexion x 10 reps each, 3 x daily.  Provided patient with red/green theraputty for HEP.      Patient demonstrates continued atrophy of first dorsal webspace and thenar/hypothenar wasting, with ape hand type posture and intrinsic muscle weakness.  Sensory testing wnl's.   strength has shown 15 lbs. Increase, however pinch strength is significantly diminished since last assessment in 2015.  FM coordination is delayed with increased time and compensatory postures required to perform FM tasks; in hand manipulation wfl's.                 Assessment:   Problem List : R hand       Decreased ROM   Decreased  and pinch strength   Decreased muscle strength   Decreased functional hand use   Decreased coordination   Ulnar/median palsy      Goals: ( 4 weeks)   1)  Patient to be IND with HEP and modalities for  pain management     Plan:   Patient to be treated by Occupational Therapy  for eval only prior to return to MD for possible surgical consultation per MD request    during the certification period from       to achieve the established goals.    Treatment to include :  paraffin, fluidotherapy, manual therapy/joint mobilizations, orthotic fabrication/fitting/training, kinesiotaping, e-stim, modalities for pain management, US 3mhz, therapeutic exercises/activities,        strengthening,    scar and edema management, as well as any other treatments deemed necessary based on the patient's needs or progress.                I certify the need for these services furnished under this plan of treatment and while under my care     ____________________________________                         __________________  Physician/Referring Practitioner                                               Date of Signature

## 2020-04-14 ENCOUNTER — PATIENT OUTREACH (OUTPATIENT)
Dept: ADMINISTRATIVE | Facility: HOSPITAL | Age: 50
End: 2020-04-14

## 2020-04-22 ENCOUNTER — DOCUMENTATION ONLY (OUTPATIENT)
Dept: REHABILITATION | Facility: HOSPITAL | Age: 50
End: 2020-04-22

## 2020-04-22 NOTE — PROGRESS NOTES
4/22/2020    Left a message requesting return phone from patient to check on status, answer questions, and provide HEP upgrade if needed. Also informed patient that Telehealth virtual visits are now available due to therapy postponement due to COVID-19.      KELLEY Villar, ISIDOROT

## 2020-05-03 ENCOUNTER — PATIENT MESSAGE (OUTPATIENT)
Dept: INTERNAL MEDICINE | Facility: CLINIC | Age: 50
End: 2020-05-03

## 2020-05-07 ENCOUNTER — PATIENT MESSAGE (OUTPATIENT)
Dept: ORTHOPEDICS | Facility: CLINIC | Age: 50
End: 2020-05-07

## 2020-05-07 DIAGNOSIS — Z98.890 POST-OPERATIVE STATE: Primary | ICD-10-CM

## 2020-05-18 ENCOUNTER — CLINICAL SUPPORT (OUTPATIENT)
Dept: REHABILITATION | Facility: HOSPITAL | Age: 50
End: 2020-05-18
Payer: COMMERCIAL

## 2020-05-18 DIAGNOSIS — Z98.890 POST-OPERATIVE STATE: ICD-10-CM

## 2020-05-18 DIAGNOSIS — M79.642 LEFT HAND PAIN: ICD-10-CM

## 2020-05-18 DIAGNOSIS — M25.522 ELBOW PAIN, LEFT: ICD-10-CM

## 2020-05-18 PROCEDURE — 97018 PARAFFIN BATH THERAPY: CPT | Mod: 59

## 2020-05-18 PROCEDURE — 97110 THERAPEUTIC EXERCISES: CPT

## 2020-05-18 NOTE — PROGRESS NOTES
"See POC     Occupational Therapy Daily Treatment Note and updated POC      Date: 5/18/2020  Name: Michael Lowe  Clinic Number: [id number]    Medical Diagnosis: Left CTR and ulnar nerve decompression at elbow 3/4/2020  Therapy Diagnosis:        Encounter Diagnoses   Name Primary?   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   Response to previous treatment:more movement   Functional change: improved functional use for making furniture, but weak  and pinch remain     Pain: 0/10  Location: bilateral hands - Aching -muscle fatigue, weakness       Objective     Michael received the following supervised modalities after being cleared for contradictions for 10  minutes:   -Paraffin bath  x 10 min to B  Hand(s) post treatment  to increase blood flow, circulation and tissue elasticity and for muscle pain management following tx session     Michael received therapeutic exercises for 35 minutes including:  -ulnar nerve glides, post op positions 1-3 in limited, pain free range x 10 reps each, 2-3 x daily   - tendon glides including hook, wave and roll and press x 10 reps to promote tendon excursion thru scar adhesion   - median nerve glides x 10 reps  - 45 LB. For B  strengthening x 15 " "reps each   - green putty for sustained  x 15 reps each for B hand strengthening   - red putty for B key, 2, and 3 pt pinch x 10 reps each   - red putty "donut" for composite digit extension x B hands x 10 reps each   - constant force green for digit extension B with block to MP's x 20 reps for extensor strengthening   - attempted EPL/APB with red rubberband and constant force with min success for strengthening  With placement and hold   - provided yellow/red rubberband and green theraputty for HEP.       Assessed /pinch  as follows:     3/18/2020 5/18/2020 3/18/2020 5/18/2020     RIGHT RIGHT LEFT  LEFT    Rung II 68  60 (-8) 126  102 (-24)    Rung III  75  70 (-5)  135  98 (-37)    Bledsoe Pinch 8 9 (+1)  20  17.5 (-2.5)    3pt Pinch nt NT  15  13 (-2)    2pt Pinch 5 4(-1)  15  10 (-5)          Home Exercises and Education Provided     Education provided:   - upgrade to , pinch and digit extensor exercises   - Progress towards goals     Written Home Exercises Provided: Patient instructed to cont prior HEP.  Exercises were reviewed and Michael was able to demonstrate them prior to the end of the session.  Michael demonstrated good  understanding of the HEP provided.   .   See EMR under Patient Instructions for exercises provided 5/18/2020.        Assessment     Michael is progressing well towards his goals and there are no updates to goals at this time. Pt prognosis is Good.      and pinch strength diminished in B hands following L CTR/CT surgery in March.  Will progress with strengthening program as tolerated to increase  , pinch and BUE strength and endurance.  Pt will continue to benefit from skilled outpatient occupational therapy to address the deficits listed in the problem list on initial evaluation to improve ROM, strength, pain management, /pinch strength, functional use, scar and edema management and to maximize pt's level of independence with ADLs in the home, work  and community environment. "     Anticipated barriers to occupational therapy: None     Pt's spiritual, cultural and educational needs considered and pt agreeable to plan of care and goals.    The following goals were discussed with the patient and patient is in agreement with them as to be addressed in the treatment plan.       Goals:   Short Term Goals (4 weeks)   1)  Patient to be IND with HEP and modalities for pain management  2)   Increase  strength 2-4 lbs. For gripping tools, lifting wood--   3)  Increase pinch 1-3 psi's for typing, computer work, FM activities     Long Term Goals (8 weeks)   1)  Increase  strength 5-8 lbs. For tool use, gripping, carrying, lifting furniture   2)  Increase pinch strength 4-6  psi's for FM activities and tool use        Plan   Recommend continued Outpatient Occupataional Therapy  1x week for 8 weeks to include the following interventions Paraffin, Manual therapy/joint mobilizations, Modalities for pain management, US 3 mhz, Therapeutic exercises/activities., Strengthening, Edema Control, Scar Management, Wound Care and Electrical Modalities.     Within the Certification Period/Plan of care expiration: 5/18/2020 to 7/18/2020     I certify the need for these services furnished under the plan of treatment and while under my care.     ____________________________________________________________________  Physician/Referring Practitioner   Date of Signature     Linn Gallo OT, CHT

## 2020-05-18 NOTE — PLAN OF CARE
"  Occupational Therapy Daily Treatment Note and updated POC      Date: 5/18/2020  Name: iMchael Lowe  Clinic Number: [id number]    Medical Diagnosis: Left CTR and ulnar nerve decompression at elbow 3/4/2020  Therapy Diagnosis:        Encounter Diagnoses   Name Primary?   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   Response to previous treatment:more movement   Functional change: improved functional use for making furniture, but weak  and pinch remain     Pain: 0/10  Location: bilateral hands - Aching -muscle fatigue, weakness       Objective     Michael received the following supervised modalities after being cleared for contradictions for 10  minutes:   -Paraffin bath  x 10 min to B  Hand(s) post treatment  to increase blood flow, circulation and tissue elasticity and for muscle pain management following tx session     Michael received therapeutic exercises for 35 minutes including:  -ulnar nerve glides, post op positions 1-3 in limited, pain free range x 10 reps each, 2-3 x daily   - tendon glides including hook, wave and roll and press x 10 reps to promote tendon excursion thru scar adhesion   - median nerve glides x 10 reps  - 45 LB. For B  strengthening x 15 reps each " "  - green putty for sustained  x 15 reps each for B hand strengthening   - red putty for B key, 2, and 3 pt pinch x 10 reps each   - red putty "donut" for composite digit extension x B hands x 10 reps each   - constant force green for digit extension B with block to MP's x 20 reps for extensor strengthening   - attempted EPL/APB with red rubberband and constant force with min success for strengthening  With placement and hold   - provided yellow/red rubberband and green theraputty for HEP.       Assessed /pinch  as follows:     3/18/2020 5/18/2020 3/18/2020 5/18/2020     RIGHT RIGHT LEFT  LEFT    Rung II 68  60 (-8) 126  102 (-24)    Rung III  75  70 (-5)  135  98 (-37)    Bledsoe Pinch 8 9 (+1)  20  17.5 (-2.5)    3pt Pinch nt NT  15  13 (-2)    2pt Pinch 5 4(-1)  15  10 (-5)          Home Exercises and Education Provided     Education provided:   - upgrade to , pinch and digit extensor exercises   - Progress towards goals     Written Home Exercises Provided: Patient instructed to cont prior HEP.  Exercises were reviewed and Michael was able to demonstrate them prior to the end of the session.  Michael demonstrated good  understanding of the HEP provided.   .   See EMR under Patient Instructions for exercises provided 5/18/2020.        Assessment     Michael is progressing well towards his goals and there are no updates to goals at this time. Pt prognosis is Good.      and pinch strength diminished in B hands following L CTR/CT surgery in March.  Will progress with strengthening program as tolerated to increase  , pinch and BUE strength and endurance.  Pt will continue to benefit from skilled outpatient occupational therapy to address the deficits listed in the problem list on initial evaluation to improve ROM, strength, pain management, /pinch strength, functional use, scar and edema management and to maximize pt's level of independence with ADLs in the home, work  and community environment. "     Anticipated barriers to occupational therapy: None     Pt's spiritual, cultural and educational needs considered and pt agreeable to plan of care and goals.    The following goals were discussed with the patient and patient is in agreement with them as to be addressed in the treatment plan.       Goals:   Short Term Goals (4 weeks)   1)  Patient to be IND with HEP and modalities for pain management  2)   Increase  strength 2-4 lbs. For gripping tools, lifting wood--   3)  Increase pinch 1-3 psi's for typing, computer work, FM activities     Long Term Goals (8 weeks)   1)  Increase  strength 5-8 lbs. For tool use, gripping, carrying, lifting furniture   2)  Increase pinch strength 4-6  psi's for FM activities and tool use        Plan   Recommend continued Outpatient Occupataional Therapy  1x week for 8 weeks to include the following interventions Paraffin, Manual therapy/joint mobilizations, Modalities for pain management, US 3 mhz, Therapeutic exercises/activities., Strengthening, Edema Control, Scar Management, Wound Care and Electrical Modalities.     Within the Certification Period/Plan of care expiration: 5/18/2020 to 7/18/2020     I certify the need for these services furnished under the plan of treatment and while under my care.     ____________________________________________________________________  Physician/Referring Practitioner   Date of Signature     Linn Gallo OT, CHT

## 2020-06-01 ENCOUNTER — CLINICAL SUPPORT (OUTPATIENT)
Dept: REHABILITATION | Facility: HOSPITAL | Age: 50
End: 2020-06-01
Payer: COMMERCIAL

## 2020-06-01 DIAGNOSIS — M79.642 LEFT HAND PAIN: ICD-10-CM

## 2020-06-01 DIAGNOSIS — M25.522 ELBOW PAIN, LEFT: ICD-10-CM

## 2020-06-01 PROCEDURE — 97110 THERAPEUTIC EXERCISES: CPT

## 2020-06-01 NOTE — PROGRESS NOTES
"See POC     Occupational Therapy Daily Treatment Note and updated POC      Date: 6/1/2020  Name: Michael Lowe  Clinic Number: [id number]    Medical Diagnosis: Left CTR and ulnar nerve decompression at elbow 3/4/2020  Therapy Diagnosis:        Encounter Diagnoses   Name Primary?    Post-operative state      Left hand pain      Elbow pain, left        Physician: Anna Sullivan PA-C     Physician Orders:eval and treat      Surgical Procedure and Date: 3/4/20 - CTR, ulnar nerve decompression at elbow   Evaluation Date: 3/18/2020     Plan of Care Certification Period:5/18/2020  Date of Return to MD: as needed      Visit # / Visits authorized: 3 / 20  Insurance Authorization Period Expiration:5/7/2021  No foto      Time In:915    Time Out: 10 am   Total Billable Time: 45  minutes     Precautions:  Standard    Subjective     Pt reports: "It shouldn't be tired, I only boiled crawfish over the weekend, it does get tired and achey at end of the day"   he was compliant with home exercise program given last session.   Response to previous treatment:more movement   Functional change: improved functional use for making furniture, but weak  and pinch remain     Pain: 0/10  Location: bilateral hands - Aching -muscle fatigue, weakness       Objective     Michael received the following supervised modalities after being cleared for contradictions for 10  minutes:   -Paraffin bath  x 10 min to B  Hand(s) post treatment  to increase blood flow, circulation and tissue elasticity and for muscle pain management following tx session     Michael received therapeutic exercises for 35 minutes including:  -ulnar nerve glides, post op positions 1-3 in limited, pain free range x 10 reps each, 2-3 x daily   - tendon glides including hook, wave and roll and press x 10 reps to promote tendon excursion thru scar adhesion   - median nerve glides x 10 reps  - 79 LB. For B  strengthening x 15 reps each   - green putty for sustained  x 15 " "reps each for B hand strengthening   - green  putty for B key, 2, and 3 pt pinch x 10 reps each   - green putty "donut" for composite digit extension x B hands x 10 reps each   - constant force green for digit extension B with block to MP's x 20 reps for extensor strengthening   - double yellow digi extender for EDC strengthening x 20 reps   - 8# clothespin for pom poms to increase pinch strength x 30 reps each       Assessed /pinch  as follows:     3/18/2020 5/18/2020 3/18/2020 5/18/2020 6/1/2020     RIGHT RIGHT LEFT  LEFT  Left    Rung II 68  60 (-8) 126  102 (-24)  98 (-4)   Rung III  75  70 (-5)  135  98 (-37)  95 (-3)    Key Pinch 8 9 (+1)  20  17.5 (-2.5)  14 (-3.5)   3pt Pinch nt NT  15  13 (-2)  13 (=)    2pt Pinch 5 4(-1)  15  10 (-5)  8 (-2)          Home Exercises and Education Provided     Education provided:   - upgrade to , pinch and digit extensor exercises   - Progress towards goals     Written Home Exercises Provided: Patient instructed to cont prior HEP.  Exercises were reviewed and Michael was able to demonstrate them prior to the end of the session.  Michael demonstrated good  understanding of the HEP provided.   .   See EMR under Patient Instructions for exercises provided 5/18/2020.        Assessment     Michael is progressing well towards his goals and there are no updates to goals at this time. Pt prognosis is Good.      and pinch strength continue to  diminish in B hands following L CTR/CT surgery in March.   Functional use is progressing, however, muscle fatigue and endurance remain limited.  Will progress with strengthening program as tolerated to increase  , pinch and BUE strength and endurance.  Pt will continue to benefit from skilled outpatient occupational therapy to address the deficits listed in the problem list on initial evaluation to improve ROM, strength, pain management, /pinch strength, functional use, scar and edema management and to maximize pt's level of " independence with ADLs in the home, work  and community environment.     Anticipated barriers to occupational therapy: None     Pt's spiritual, cultural and educational needs considered and pt agreeable to plan of care and goals.    The following goals were discussed with the patient and patient is in agreement with them as to be addressed in the treatment plan.       Goals:   Short Term Goals (4 weeks)   1)  Patient to be IND with HEP and modalities for pain management  2)   Increase  strength 2-4 lbs. For gripping tools, lifting wood--   3)  Increase pinch 1-3 psi's for typing, computer work, FM activities     Long Term Goals (8 weeks)   1)  Increase  strength 5-8 lbs. For tool use, gripping, carrying, lifting furniture   2)  Increase pinch strength 4-6  psi's for FM activities and tool use        Plan   Recommend continued Outpatient Occupataional Therapy  1x week for 8 weeks to include the following interventions Paraffin, Manual therapy/joint mobilizations, Modalities for pain management, US 3 mhz, Therapeutic exercises/activities., Strengthening, Edema Control, Scar Management, Wound Care and Electrical Modalities.     Within the Certification Period/Plan of care expiration: 5/18/2020 to 7/18/2020     Linn Gallo OT, CHT

## 2020-06-02 ENCOUNTER — LAB VISIT (OUTPATIENT)
Dept: PRIMARY CARE CLINIC | Facility: CLINIC | Age: 50
End: 2020-06-02
Payer: COMMERCIAL

## 2020-06-02 DIAGNOSIS — Z11.59 ENCOUNTER FOR SCREENING FOR OTHER VIRAL DISEASES: Primary | ICD-10-CM

## 2020-06-02 PROCEDURE — U0003 INFECTIOUS AGENT DETECTION BY NUCLEIC ACID (DNA OR RNA); SEVERE ACUTE RESPIRATORY SYNDROME CORONAVIRUS 2 (SARS-COV-2) (CORONAVIRUS DISEASE [COVID-19]), AMPLIFIED PROBE TECHNIQUE, MAKING USE OF HIGH THROUGHPUT TECHNOLOGIES AS DESCRIBED BY CMS-2020-01-R: HCPCS

## 2020-06-03 LAB — SARS-COV-2 RNA RESP QL NAA+PROBE: NOT DETECTED

## 2020-06-22 ENCOUNTER — CLINICAL SUPPORT (OUTPATIENT)
Dept: REHABILITATION | Facility: HOSPITAL | Age: 50
End: 2020-06-22
Payer: COMMERCIAL

## 2020-06-22 DIAGNOSIS — M79.642 LEFT HAND PAIN: ICD-10-CM

## 2020-06-22 DIAGNOSIS — M25.522 ELBOW PAIN, LEFT: ICD-10-CM

## 2020-06-22 PROCEDURE — 97018 PARAFFIN BATH THERAPY: CPT | Mod: 59

## 2020-06-22 PROCEDURE — 97110 THERAPEUTIC EXERCISES: CPT

## 2020-06-22 NOTE — PROGRESS NOTES
"    Occupational Therapy Daily Treatment Note      Date: 6/22/2020  Name: Michael Lowe  Clinic Number: [id number]    Medical Diagnosis: Left CTR and ulnar nerve decompression at elbow 3/4/2020  Therapy Diagnosis:        Encounter Diagnoses   Name Primary?    Post-operative state      Left hand pain      Elbow pain, left        Physician: Anna Sullivan PA-C     Physician Orders:eval and treat      Surgical Procedure and Date: 3/4/20 - CTR, ulnar nerve decompression at elbow   Evaluation Date: 3/18/2020     Plan of Care Certification Period:5/18/2020  Date of Return to MD: as needed      Visit # / Visits authorized: 4 / 20  Insurance Authorization Period Expiration:5/7/2021  No foto      Time In:915    Time Out: 10 am   Total Billable Time: 45  minutes     Precautions:  Standard  Subjective     Pt reports: "aching in left hand, stiff in the morning, like arthritis,   he was compliant with home exercise program given last session.   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 For B  strengthening x 15 reps each   - green putty for sustained  x 15 reps each for B hand strengthening   - green  putty for B key, 2, and 3 pt pinch x 10 reps each   - green putty "donut" for composite digit extension x B hands x 10 reps each   - constant force green for digit extension B with block to MP's x 20 reps for extensor strengthening   - double yellow digi extender for " EDC strengthening x 20 reps   - 8# clothespin for pom poms to increase pinch strength x 30 reps each       Assessed /pinch  as follows:   Pre-op measurements taken prior to surgery on 3/4/20    3/1/20 5/18/2020 3/1/20 5/18/2020 6/1/2020 6/22/20 6/22/2020     RIGHT RIGHT LEFT  LEFT  Left  Right  Left    Rung II 68  60 (-8) 126  102 (-24)  98 (-4) 70 (+10)  106 (+8)    Rung III  75  70 (-5)  135  98 (-37)  95 (-3)  70 (=)  109 (+14)    Key Pinch 8 9 (+1)  20  17.5 (-2.5)  14 (-3.5) 6 (-3)  15 (+1)    3pt Pinch nt NT  15  13 (-2)  13 (=)  NT  14 (+1)    2pt Pinch 5 4(-1)  15  10 (-5)  8 (-2)  4 (=)  10 (+2)      Home Exercises and Education Provided     Education provided:   - upgrade to , pinch and digit extensor exercises   - Progress towards goals     Written Home Exercises Provided: Patient instructed to cont prior HEP.  Exercises were reviewed and Michael was able to demonstrate them prior to the end of the session.  Michael demonstrated good  understanding of the HEP provided.   .   See EMR under Patient Instructions for exercises provided 5/18/2020.        Assessment     Michael is progressing well towards his goals and there are no updates to goals at this time. Pt prognosis is Good.      and pinch strength increased in left UE this date, R slightly increased for  strength only.    Functional use is progressing, however, muscle fatigue and endurance remain limited.  Will progress with strengthening program as tolerated to increase  , pinch and BUE strength and endurance.  Pt will continue to benefit from skilled outpatient occupational therapy to address the deficits listed in the problem list on initial evaluation to improve ROM, strength, pain management, /pinch strength, functional use, scar and edema management and to maximize pt's level of independence with ADLs in the home, work  and community environment.     Anticipated barriers to occupational therapy: None     Pt's spiritual, cultural and  educational needs considered and pt agreeable to plan of care and goals.    The following goals were discussed with the patient and patient is in agreement with them as to be addressed in the treatment plan.       Goals:   Short Term Goals (4 weeks)   1)  Patient to be IND with HEP and modalities for pain management  2)   Increase  strength 2-4 lbs. For gripping tools, lifting wood-- met  3)  Increase pinch 1-3 psi's for typing, computer work, FM activities- met      Long Term Goals (8 weeks)   1)  Increase  strength 5-8 lbs. For tool use, gripping, carrying, lifting furniture   2)  Increase pinch strength 4-6  psi's for FM activities and tool use        Plan   Recommend continued Outpatient Occupataional Therapy  1x week for 8 weeks to include the following interventions Paraffin, Manual therapy/joint mobilizations, Modalities for pain management, US 3 mhz, Therapeutic exercises/activities., Strengthening, Edema Control, Scar Management, Wound Care and Electrical Modalities.     Within the Certification Period/Plan of care expiration: 5/18/2020 to 7/18/2020     Linn Gallo OT, CHT

## 2020-07-23 ENCOUNTER — LAB VISIT (OUTPATIENT)
Dept: PRIMARY CARE CLINIC | Facility: OTHER | Age: 50
End: 2020-07-23
Attending: INTERNAL MEDICINE
Payer: COMMERCIAL

## 2020-07-23 DIAGNOSIS — Z03.818 ENCOUNTER FOR OBSERVATION FOR SUSPECTED EXPOSURE TO OTHER BIOLOGICAL AGENTS RULED OUT: ICD-10-CM

## 2020-07-23 PROCEDURE — U0003 INFECTIOUS AGENT DETECTION BY NUCLEIC ACID (DNA OR RNA); SEVERE ACUTE RESPIRATORY SYNDROME CORONAVIRUS 2 (SARS-COV-2) (CORONAVIRUS DISEASE [COVID-19]), AMPLIFIED PROBE TECHNIQUE, MAKING USE OF HIGH THROUGHPUT TECHNOLOGIES AS DESCRIBED BY CMS-2020-01-R: HCPCS

## 2020-07-24 ENCOUNTER — PATIENT OUTREACH (OUTPATIENT)
Dept: ADMINISTRATIVE | Facility: HOSPITAL | Age: 50
End: 2020-07-24

## 2020-07-26 LAB — SARS-COV-2 RNA RESP QL NAA+PROBE: NEGATIVE

## 2020-08-27 ENCOUNTER — LAB VISIT (OUTPATIENT)
Dept: PRIMARY CARE CLINIC | Facility: OTHER | Age: 50
End: 2020-08-27
Attending: INTERNAL MEDICINE
Payer: COMMERCIAL

## 2020-08-27 DIAGNOSIS — R05.9 COUGH: ICD-10-CM

## 2020-08-27 PROCEDURE — U0003 INFECTIOUS AGENT DETECTION BY NUCLEIC ACID (DNA OR RNA); SEVERE ACUTE RESPIRATORY SYNDROME CORONAVIRUS 2 (SARS-COV-2) (CORONAVIRUS DISEASE [COVID-19]), AMPLIFIED PROBE TECHNIQUE, MAKING USE OF HIGH THROUGHPUT TECHNOLOGIES AS DESCRIBED BY CMS-2020-01-R: HCPCS

## 2020-08-28 LAB — SARS-COV-2 RNA RESP QL NAA+PROBE: NOT DETECTED

## 2020-09-16 ENCOUNTER — PATIENT MESSAGE (OUTPATIENT)
Dept: ORTHOPEDICS | Facility: CLINIC | Age: 50
End: 2020-09-16

## 2020-10-08 ENCOUNTER — OFFICE VISIT (OUTPATIENT)
Dept: INTERNAL MEDICINE | Facility: CLINIC | Age: 50
End: 2020-10-08
Attending: INTERNAL MEDICINE
Payer: COMMERCIAL

## 2020-10-08 ENCOUNTER — LAB VISIT (OUTPATIENT)
Dept: LAB | Facility: OTHER | Age: 50
End: 2020-10-08
Attending: INTERNAL MEDICINE
Payer: COMMERCIAL

## 2020-10-08 VITALS
HEART RATE: 74 BPM | OXYGEN SATURATION: 95 % | WEIGHT: 249.56 LBS | HEIGHT: 75 IN | DIASTOLIC BLOOD PRESSURE: 76 MMHG | BODY MASS INDEX: 31.03 KG/M2 | SYSTOLIC BLOOD PRESSURE: 114 MMHG

## 2020-10-08 DIAGNOSIS — S86.912S STRAIN OF LEFT KNEE, SEQUELA: ICD-10-CM

## 2020-10-08 DIAGNOSIS — Z12.11 SCREENING FOR COLON CANCER: ICD-10-CM

## 2020-10-08 DIAGNOSIS — Z00.00 ANNUAL PHYSICAL EXAM: ICD-10-CM

## 2020-10-08 DIAGNOSIS — I25.10 CORONARY ARTERY DISEASE INVOLVING NATIVE CORONARY ARTERY OF NATIVE HEART WITHOUT ANGINA PECTORIS: ICD-10-CM

## 2020-10-08 DIAGNOSIS — E78.00 PURE HYPERCHOLESTEROLEMIA: ICD-10-CM

## 2020-10-08 DIAGNOSIS — Z12.5 PROSTATE CANCER SCREENING: ICD-10-CM

## 2020-10-08 DIAGNOSIS — Z00.00 ANNUAL PHYSICAL EXAM: Primary | ICD-10-CM

## 2020-10-08 LAB
ALBUMIN SERPL BCP-MCNC: 4.5 G/DL (ref 3.5–5.2)
ALP SERPL-CCNC: 67 U/L (ref 55–135)
ALT SERPL W/O P-5'-P-CCNC: 48 U/L (ref 10–44)
ANION GAP SERPL CALC-SCNC: 11 MMOL/L (ref 8–16)
AST SERPL-CCNC: 29 U/L (ref 10–40)
BASOPHILS # BLD AUTO: 0.03 K/UL (ref 0–0.2)
BASOPHILS NFR BLD: 0.6 % (ref 0–1.9)
BILIRUB SERPL-MCNC: 0.7 MG/DL (ref 0.1–1)
BUN SERPL-MCNC: 16 MG/DL (ref 6–20)
CALCIUM SERPL-MCNC: 9.5 MG/DL (ref 8.7–10.5)
CHLORIDE SERPL-SCNC: 105 MMOL/L (ref 95–110)
CHOLEST SERPL-MCNC: 150 MG/DL (ref 120–199)
CHOLEST/HDLC SERPL: 4.2 {RATIO} (ref 2–5)
CO2 SERPL-SCNC: 24 MMOL/L (ref 23–29)
COMPLEXED PSA SERPL-MCNC: 0.45 NG/ML (ref 0–4)
CREAT SERPL-MCNC: 1 MG/DL (ref 0.5–1.4)
DIFFERENTIAL METHOD: ABNORMAL
EOSINOPHIL # BLD AUTO: 0.1 K/UL (ref 0–0.5)
EOSINOPHIL NFR BLD: 1 % (ref 0–8)
ERYTHROCYTE [DISTWIDTH] IN BLOOD BY AUTOMATED COUNT: 11.5 % (ref 11.5–14.5)
EST. GFR  (AFRICAN AMERICAN): >60 ML/MIN/1.73 M^2
EST. GFR  (NON AFRICAN AMERICAN): >60 ML/MIN/1.73 M^2
ESTIMATED AVG GLUCOSE: 126 MG/DL (ref 68–131)
GLUCOSE SERPL-MCNC: 119 MG/DL (ref 70–110)
HBA1C MFR BLD HPLC: 6 % (ref 4–5.6)
HCT VFR BLD AUTO: 43.5 % (ref 40–54)
HDLC SERPL-MCNC: 36 MG/DL (ref 40–75)
HDLC SERPL: 24 % (ref 20–50)
HGB BLD-MCNC: 14.6 G/DL (ref 14–18)
IMM GRANULOCYTES # BLD AUTO: 0.01 K/UL (ref 0–0.04)
IMM GRANULOCYTES NFR BLD AUTO: 0.2 % (ref 0–0.5)
LDLC SERPL CALC-MCNC: 91 MG/DL (ref 63–159)
LYMPHOCYTES # BLD AUTO: 1.8 K/UL (ref 1–4.8)
LYMPHOCYTES NFR BLD: 35.1 % (ref 18–48)
MCH RBC QN AUTO: 31.7 PG (ref 27–31)
MCHC RBC AUTO-ENTMCNC: 33.6 G/DL (ref 32–36)
MCV RBC AUTO: 94 FL (ref 82–98)
MONOCYTES # BLD AUTO: 0.5 K/UL (ref 0.3–1)
MONOCYTES NFR BLD: 8.7 % (ref 4–15)
NEUTROPHILS # BLD AUTO: 2.8 K/UL (ref 1.8–7.7)
NEUTROPHILS NFR BLD: 54.4 % (ref 38–73)
NONHDLC SERPL-MCNC: 114 MG/DL
NRBC BLD-RTO: 0 /100 WBC
PLATELET # BLD AUTO: 270 K/UL (ref 150–350)
PMV BLD AUTO: 9.4 FL (ref 9.2–12.9)
POTASSIUM SERPL-SCNC: 4.4 MMOL/L (ref 3.5–5.1)
PROT SERPL-MCNC: 7.4 G/DL (ref 6–8.4)
RBC # BLD AUTO: 4.61 M/UL (ref 4.6–6.2)
SODIUM SERPL-SCNC: 140 MMOL/L (ref 136–145)
TRIGL SERPL-MCNC: 115 MG/DL (ref 30–150)
TSH SERPL DL<=0.005 MIU/L-ACNC: 1.52 UIU/ML (ref 0.4–4)
WBC # BLD AUTO: 5.16 K/UL (ref 3.9–12.7)

## 2020-10-08 PROCEDURE — 3008F BODY MASS INDEX DOCD: CPT | Mod: CPTII,S$GLB,, | Performed by: INTERNAL MEDICINE

## 2020-10-08 PROCEDURE — 36415 COLL VENOUS BLD VENIPUNCTURE: CPT

## 2020-10-08 PROCEDURE — 83036 HEMOGLOBIN GLYCOSYLATED A1C: CPT

## 2020-10-08 PROCEDURE — 85025 COMPLETE CBC W/AUTO DIFF WBC: CPT

## 2020-10-08 PROCEDURE — 80053 COMPREHEN METABOLIC PANEL: CPT

## 2020-10-08 PROCEDURE — 84443 ASSAY THYROID STIM HORMONE: CPT

## 2020-10-08 PROCEDURE — 99396 PR PREVENTIVE VISIT,EST,40-64: ICD-10-PCS | Mod: S$GLB,,, | Performed by: INTERNAL MEDICINE

## 2020-10-08 PROCEDURE — 99396 PREV VISIT EST AGE 40-64: CPT | Mod: S$GLB,,, | Performed by: INTERNAL MEDICINE

## 2020-10-08 PROCEDURE — 3008F PR BODY MASS INDEX (BMI) DOCUMENTED: ICD-10-PCS | Mod: CPTII,S$GLB,, | Performed by: INTERNAL MEDICINE

## 2020-10-08 PROCEDURE — 99999 PR PBB SHADOW E&M-EST. PATIENT-LVL III: CPT | Mod: PBBFAC,,, | Performed by: INTERNAL MEDICINE

## 2020-10-08 PROCEDURE — 84153 ASSAY OF PSA TOTAL: CPT

## 2020-10-08 PROCEDURE — 80061 LIPID PANEL: CPT

## 2020-10-08 PROCEDURE — 99999 PR PBB SHADOW E&M-EST. PATIENT-LVL III: ICD-10-PCS | Mod: PBBFAC,,, | Performed by: INTERNAL MEDICINE

## 2020-10-08 RX ORDER — NAPROXEN SODIUM 220 MG/1
81 TABLET, FILM COATED ORAL DAILY
Start: 2020-10-08

## 2020-10-08 NOTE — PROGRESS NOTES
"Subjective:       Patient ID: Michael Lowe is a 49 y.o. male.    Chief Complaint: Annual Exam    Here for annual exam    1 month prior played soccer with young family member and since then has had some medial left knee tightness and clicking and catching. Minimal swelling. No inciting trauma. No giving out sensation. No change to overlying skin.     Has gained some weight. EtOH is up some. Eating healthier in some regards, but overall calories are up.     -CAD-  4/18/18 Calcium Ct score- Agatston score 101-400: Moderate plaque burden. High cardiovascular disease risk.  He is tolerating atorvastatin 40 mg.          Review of Systems   Constitutional: Negative for appetite change, chills, fever and unexpected weight change.   HENT: Negative for hearing loss, sore throat and trouble swallowing.    Eyes: Negative for visual disturbance.   Respiratory: Negative for cough, chest tightness and shortness of breath.    Cardiovascular: Negative for chest pain and leg swelling.   Gastrointestinal: Negative for abdominal pain, blood in stool, constipation, diarrhea, nausea and vomiting.   Endocrine: Negative for polydipsia and polyuria.   Genitourinary: Negative for decreased urine volume, difficulty urinating, dysuria, frequency and urgency.   Musculoskeletal: Negative for gait problem.   Skin: Negative for rash.   Neurological: Negative for dizziness and numbness.   Psychiatric/Behavioral: The patient is not nervous/anxious.        Objective:      Vitals:    10/08/20 0745   BP: 114/76   Pulse: 74   SpO2: 95%   Weight: 113.2 kg (249 lb 9 oz)   Height: 6' 3" (1.905 m)      Physical Exam  Constitutional:       General: He is not in acute distress.     Appearance: He is well-developed.   HENT:      Head: Normocephalic and atraumatic.      Mouth/Throat:      Pharynx: No oropharyngeal exudate.   Eyes:      General: No scleral icterus.     Conjunctiva/sclera: Conjunctivae normal.      Pupils: Pupils are equal, round, and " reactive to light.   Neck:      Thyroid: No thyromegaly.   Cardiovascular:      Rate and Rhythm: Normal rate and regular rhythm.      Heart sounds: Normal heart sounds. No murmur.   Pulmonary:      Effort: Pulmonary effort is normal.      Breath sounds: Normal breath sounds. No wheezing or rales.   Abdominal:      General: There is no distension.      Palpations: Abdomen is soft.      Tenderness: There is no abdominal tenderness.   Musculoskeletal:         General: No tenderness.      Left knee: He exhibits normal range of motion, no swelling, no effusion, no bony tenderness and normal meniscus. No tenderness found.   Lymphadenopathy:      Cervical: No cervical adenopathy.   Skin:     General: Skin is warm and dry.   Neurological:      Mental Status: He is alert and oriented to person, place, and time.   Psychiatric:         Behavior: Behavior normal.         Assessment:       1. Annual physical exam    2. Prostate cancer screening    3. Screening for colon cancer    4. Strain of left knee, sequela    5. Pure hypercholesterolemia    6. Coronary artery disease involving native coronary artery of native heart without angina pectoris        Plan:       Michael was seen today for annual exam.    Diagnoses and all orders for this visit:    Annual physical exam  -     Comprehensive Metabolic Panel; Future  -     Lipid Panel; Future  -     TSH; Future  -     CBC auto differential; Future  -     Hemoglobin A1C; Future    Prostate cancer screening  -     PSA, Screening; Future    Screening for colon cancer  -     Ambulatory referral/consult to Gastroenterology; Future    Strain of left knee, sequela   No red flags, exam benign.  RICE and monitoring. Office and Emergency Department prompts discussed.    Pure hypercholesterolemia   Tolerating statin. Continue this.     Coronary artery disease involving native coronary artery of native heart without angina pectoris   continue ASA and statin. Increase exercise and decrease weight.  S/s of UA or HF       RTC in 1 year or sooner prmaggy Blum MD  Internal Medicine-[facility]        Side effects of medication(s) were discussed in detail and patient voiced understanding.  Patient will call back for any issues or complications.

## 2020-10-16 ENCOUNTER — OFFICE VISIT (OUTPATIENT)
Dept: URGENT CARE | Facility: CLINIC | Age: 50
End: 2020-10-16
Payer: COMMERCIAL

## 2020-10-16 VITALS
WEIGHT: 248 LBS | DIASTOLIC BLOOD PRESSURE: 72 MMHG | SYSTOLIC BLOOD PRESSURE: 125 MMHG | TEMPERATURE: 98 F | HEIGHT: 75 IN | HEART RATE: 78 BPM | OXYGEN SATURATION: 97 % | BODY MASS INDEX: 30.84 KG/M2 | RESPIRATION RATE: 16 BRPM

## 2020-10-16 DIAGNOSIS — Z11.59 SCREENING FOR VIRAL DISEASE: ICD-10-CM

## 2020-10-16 DIAGNOSIS — A08.4 VIRAL GASTROENTERITIS: Primary | ICD-10-CM

## 2020-10-16 DIAGNOSIS — R19.7 DIARRHEA, UNSPECIFIED TYPE: ICD-10-CM

## 2020-10-16 DIAGNOSIS — R50.9 FEVER, UNSPECIFIED FEVER CAUSE: ICD-10-CM

## 2020-10-16 LAB
CTP QC/QA: YES
SARS-COV-2 RDRP RESP QL NAA+PROBE: NEGATIVE

## 2020-10-16 PROCEDURE — 99214 OFFICE O/P EST MOD 30 MIN: CPT | Mod: S$GLB,,, | Performed by: NURSE PRACTITIONER

## 2020-10-16 PROCEDURE — 99214 PR OFFICE/OUTPT VISIT, EST, LEVL IV, 30-39 MIN: ICD-10-PCS | Mod: S$GLB,,, | Performed by: NURSE PRACTITIONER

## 2020-10-16 PROCEDURE — U0002 COVID-19 LAB TEST NON-CDC: HCPCS | Mod: QW,S$GLB,, | Performed by: NURSE PRACTITIONER

## 2020-10-16 PROCEDURE — U0002: ICD-10-PCS | Mod: QW,S$GLB,, | Performed by: NURSE PRACTITIONER

## 2020-10-16 NOTE — PATIENT INSTRUCTIONS
Treating Diarrhea    Diarrhea happens when you have loose, watery, or frequent bowel movements. It is a common problem with many causes. Most cases of diarrhea clear up on their own. But certain cases may need treatment. Be sure to see your healthcare provider if your symptoms do not improve within a few days.  Getting relief  Treatment of diarrhea depends on its cause. Diarrhea caused by bacterial or parasite infection is often treated with antibiotics. Diarrhea caused by other factors, such as a stomach virus, often improves with simple home treatment. The tips below may also help relieve your symptoms.  · Drink plenty of fluids. This helps prevent too much fluid loss (dehydration). Water, clear soups, and electrolyte solutions are good choices. Avoid alcohol, coffee, tea, and milk. These can irritate your intestines and make symptoms worse.  · Suck on ice chips if drinking makes you queasy.  · Return to your normal diet slowly. You may want to eat bland foods at first, such as rice and toast. Also, you may need to avoid certain foods for a while, such as dairy products. These can make symptoms worse. Ask your healthcare provider if there are any other foods you should avoid.  · If you were prescribed antibiotics, take them as directed.  · Do not take anti-diarrhea medicines without asking your healthcare provider first.  Call your healthcare provider   Call your healthcare provider if you have any of the following:   · A fever of 100.4°F (38.0°C) or higher, or as directed by your healthcare provider  · Severe pain  · Worsening diarrhea or diarrhea for more than 2 days  · Bloody vomit or stool  · Signs of dehydration (dizziness, dry mouth and tongue, rapid pulse, dark urine)  Date Last Reviewed: 7/1/2016 © 2000-2017 [employer]. [address] [place] [address], [place] [address]. All rights reserved. This information is not intended as a substitute for professional medical care. Always follow your  healthcare professional's instructions.        Viral Gastroenteritis (Adult)    Gastroenteritis is commonly called the stomach flu. It is most often caused by a virus that affects the stomach and intestinal tract and usually lasts from 2 to 7 days. Common viruses causing gastroenteritis include norovirus, rotavirus, and hepatitis A. Non-viral causes of gastroenteritis include bacteria, parasites, and toxins.  The danger from repeated vomiting or diarrhea is dehydration. This is the loss of too much fluid from the body. When this occurs, body fluids must be replaced. Antibiotics do not help with this illness because it is usually viral.Simple home treatment will be helpful.  Symptoms of viral gastroenteritis may include:  · Watery, loose stools  · Stomach pain or abdominal cramps  · Fever and chills  · Nausea and vomiting  · Loss of bowel control  · Headache  Home care  Gastroenteritis is transmitted by contact with the stool or vomit of an infected person. This can occur from person to person or from contact with a contaminated surface.  Follow these guidelines when caring for yourself at home:  · If symptoms are severe, rest at home for the next 24 hours or until you are feeling better.  · Wash your hands with soap and water or use alcohol-based  to prevent the spread of infection. Wash your hands after touching anyone who is sick.  · Wash your hands or use alcohol-based  after using the toilet and before meals. Clean the toilet after each use.  Remember these tips when preparing food:  · People with diarrhea should not prepare or serve food to others. When preparing foods, wash your hands before and after.  · Wash your hands after using cutting boards, countertops, knives, or utensils that have been in contact with raw food.  · Keep uncooked meats away from cooked and ready-to-eat foods.  Medicine  You may use acetaminophen or NSAID medicines like ibuprofen or naproxen to control fever unless  another medicine was given. If you have chronic liver or kidney disease, talk with your healthcare provider before using these medicines. Also talk with your provider if you've had a stomach ulcer or gastrointestinal bleeding. Don't give aspirin to anyone under 18 years of age who is ill with a fever. It may cause severe liver damage. Don't use NSAIDS is you are already taking one for another condition (like arthritis) or are on aspirin (such as for heart disease or after a stroke).  If medicine for vomiting or diarrhea are prescribed, take these only as directed. Do not take over-the-counter medicines for vomiting or diarrhea unless instructed by your healthcare provider.  Diet  Follow these guidelines for food:  · Water and liquids are important so you don't get dehydrated. Drink a small amount at a time or suck on ice chips if you are vomiting.  · If you eat, avoid fatty, greasy, spicy, or fried foods.  · Don't eat dairy if you have diarrhea. This can make diarrhea worse.  · Avoid tobacco, alcohol, and caffeine which may worsen symptoms.  During the first 24 hours (the first full day), follow the diet below:  · Beverages. Sports drinks, soft drinks without caffeine, ginger ale, mineral water (plain or flavored), decaffeinated tea and coffee. If you are very dehydrated, sports drinks aren't a good choice. They have too much sugar and not enough electrolytes. In this case, commercially available products called oral rehydration solutions, are best.  · Soups. Eat clear broth, consommé, and bouillon.  · Desserts. Eat gelatin, popsicles, and fruit juice bars.  During the next 24 hours (the second day), you may add the following to the above:  · Hot cereal, plain toast, bread, rolls, and crackers  · Plain noodles, rice, mashed potatoes, chicken noodle or rice soup  · Unsweetened canned fruit (avoid pineapple), bananas  · Limit fat intake to less than 15 grams per day. Do this by avoiding margarine, butter, oils,  mayonnaise, sauces, gravies, fried foods, peanut butter, meat, poultry, and fish.  · Limit fiber and avoid raw or cooked vegetables, fresh fruits (except bananas), and bran cereals.  · Limit caffeine and chocolate. Don't use spices or seasonings other than salt.  · Limit dairy products.  · Avoid alcohol.  During the next 24 hours:  · Gradually resume a normal diet as you feel better and your symptoms improve.  · If at any time it starts getting worse again, go back to clear liquids until you feel better.  Follow-up care  Follow up with your healthcare provider, or as advised. Call your provider if you don't get better within 24 hours or if diarrhea lasts more than a week. Also follow up if you are unable to keep down liquids and get dehydrated. If a stool (diarrhea) sample was taken, call as directed for the results.  Call 911  Call 911 if any of these occur:  · Trouble breathing  · Chest pain  · Confused  · Severe drowsiness or trouble awakening  · Fainting or loss of consciousness  · Rapid heart rate  · Seizure  · Stiff neck  When to seek medical advice  Call your healthcare provider right away if any of these occur:  · Abdominal pain that gets worse  · Continued vomiting (unable to keep liquids down)  · Frequent diarrhea (more than 5 times a day)  · Blood in vomit or stool (black or red color)  · Dark urine, reduced urine output, or extreme thirst  · Weakness or dizziness  · Drowsiness  · Fever of 100.4°F (38°C) oral or higher that does not get better with fever medicine  · New rash  Date Last Reviewed: 1/3/2016  © 7782-9033 [employer]. [address] [place] [address], [place] [address]. All rights reserved. This information is not intended as a substitute for professional medical care. Always follow your healthcare professional's instructions.

## 2020-10-16 NOTE — PROGRESS NOTES
"Subjective:       Patient ID: Michael Lowe is a 49 y.o. male.    Vitals:  height is 6' 3" (1.905 m) and weight is 112.5 kg (248 lb). His temperature is 97.8 °F (36.6 °C). His blood pressure is 125/72 and his pulse is 78. His respiration is 16 and oxygen saturation is 97%.     Chief Complaint: COVID-19 Concerns    Patient states he started to feel bad last night had multiple episodes of diarrhea which woke him up from his sleep.  Temp max of 100.3.  Unsure of exposure.    Fatigue  This is a new problem. The current episode started yesterday. The problem occurs constantly. The problem has been gradually worsening. Associated symptoms include abdominal pain, fatigue, a fever, myalgias and nausea. Pertinent negatives include no arthralgias, chest pain, chills, congestion, coughing, headaches, joint swelling, rash, sore throat, vertigo or vomiting. Associated symptoms comments: diarrhea. Nothing aggravates the symptoms. He has tried NSAIDs for the symptoms. The treatment provided mild relief.       Constitution: Positive for fatigue and fever. Negative for chills.        100.0F   HENT: Negative for congestion and sore throat.    Neck: Negative for painful lymph nodes.   Cardiovascular: Negative for chest pain and leg swelling.   Eyes: Negative for double vision and blurred vision.   Respiratory: Negative for cough and shortness of breath.    Gastrointestinal: Positive for abdominal pain, nausea and diarrhea. Negative for vomiting.   Genitourinary: Negative for dysuria, frequency and urgency.   Musculoskeletal: Positive for muscle ache. Negative for joint pain, joint swelling and muscle cramps.   Skin: Negative for color change, pale and rash.   Allergic/Immunologic: Negative for seasonal allergies.   Neurological: Negative for dizziness, history of vertigo, light-headedness, passing out and headaches.   Hematologic/Lymphatic: Negative for swollen lymph nodes, easy bruising/bleeding and history of blood clots. " Does not bruise/bleed easily.   Psychiatric/Behavioral: Negative for nervous/anxious, sleep disturbance and depression. The patient is not nervous/anxious.        Objective:      Physical Exam   Constitutional: He is oriented to person, place, and time. He appears well-developed. He is cooperative.  Non-toxic appearance. He does not appear ill. No distress.   HENT:   Head: Normocephalic and atraumatic.   Ears:   Right Ear: Hearing, tympanic membrane, external ear and ear canal normal.   Left Ear: Hearing, tympanic membrane, external ear and ear canal normal.   Nose: Nose normal. No mucosal edema, rhinorrhea or nasal deformity. No epistaxis. Right sinus exhibits no maxillary sinus tenderness and no frontal sinus tenderness. Left sinus exhibits no maxillary sinus tenderness and no frontal sinus tenderness.   Mouth/Throat: Uvula is midline, oropharynx is clear and moist and mucous membranes are normal. No trismus in the jaw. Normal dentition. No uvula swelling. No oropharyngeal exudate, posterior oropharyngeal edema or posterior oropharyngeal erythema.   Eyes: Conjunctivae and lids are normal. No scleral icterus.   Neck: Trachea normal, full passive range of motion without pain and phonation normal. Neck supple. No neck rigidity. No edema and no erythema present.   Cardiovascular: Normal rate, regular rhythm, S1 normal, S2 normal, normal heart sounds and normal pulses. Exam reveals no gallop and no friction rub.   No murmur heard.  Pulmonary/Chest: Effort normal and breath sounds normal. No stridor. No respiratory distress. He has no decreased breath sounds. He has no wheezes. He has no rhonchi.   Abdominal: Soft. Normal appearance. Bowel sounds are increased. There is generalized abdominal tenderness.   Musculoskeletal: Normal range of motion.         General: No deformity.   Neurological: He is alert and oriented to person, place, and time. He exhibits normal muscle tone. Coordination normal.   Skin: Skin is warm,  dry, intact, not diaphoretic and not pale. Psychiatric: His speech is normal and behavior is normal. Judgment and thought content normal.   Nursing note and vitals reviewed.        Results for orders placed or performed in visit on 10/16/20   POCT COVID-19 Rapid Screening   Result Value Ref Range    POC Rapid COVID Negative Negative     Acceptable Yes        Assessment:       1. Viral gastroenteritis    2. Diarrhea, unspecified type    3. Fever, unspecified fever cause    4. Screening for viral disease        Plan:         Viral gastroenteritis    Diarrhea, unspecified type    Fever, unspecified fever cause  -     POCT COVID-19 Rapid Screening    Screening for viral disease         Patient Instructions     Treating Diarrhea    Diarrhea happens when you have loose, watery, or frequent bowel movements. It is a common problem with many causes. Most cases of diarrhea clear up on their own. But certain cases may need treatment. Be sure to see your healthcare provider if your symptoms do not improve within a few days.  Getting relief  Treatment of diarrhea depends on its cause. Diarrhea caused by bacterial or parasite infection is often treated with antibiotics. Diarrhea caused by other factors, such as a stomach virus, often improves with simple home treatment. The tips below may also help relieve your symptoms.  · Drink plenty of fluids. This helps prevent too much fluid loss (dehydration). Water, clear soups, and electrolyte solutions are good choices. Avoid alcohol, coffee, tea, and milk. These can irritate your intestines and make symptoms worse.  · Suck on ice chips if drinking makes you queasy.  · Return to your normal diet slowly. You may want to eat bland foods at first, such as rice and toast. Also, you may need to avoid certain foods for a while, such as dairy products. These can make symptoms worse. Ask your healthcare provider if there are any other foods you should avoid.  · If you were  prescribed antibiotics, take them as directed.  · Do not take anti-diarrhea medicines without asking your healthcare provider first.  Call your healthcare provider   Call your healthcare provider if you have any of the following:   · A fever of 100.4°F (38.0°C) or higher, or as directed by your healthcare provider  · Severe pain  · Worsening diarrhea or diarrhea for more than 2 days  · Bloody vomit or stool  · Signs of dehydration (dizziness, dry mouth and tongue, rapid pulse, dark urine)  Date Last Reviewed: 7/1/2016 © 2000-2017 [employer]. [address] [place] [id number]. All rights reserved. This information is not intended as a substitute for professional medical care. Always follow your healthcare professional's instructions.        Viral Gastroenteritis (Adult)    Gastroenteritis is commonly called the stomach flu. It is most often caused by a virus that affects the stomach and intestinal tract and usually lasts from 2 to 7 days. Common viruses causing gastroenteritis include norovirus, rotavirus, and hepatitis A. Non-viral causes of gastroenteritis include bacteria, parasites, and toxins.  The danger from repeated vomiting or diarrhea is dehydration. This is the loss of too much fluid from the body. When this occurs, body fluids must be replaced. Antibiotics do not help with this illness because it is usually viral.Simple home treatment will be helpful.  Symptoms of viral gastroenteritis may include:  · Watery, loose stools  · Stomach pain or abdominal cramps  · Fever and chills  · Nausea and vomiting  · Loss of bowel control  · Headache  Home care  Gastroenteritis is transmitted by contact with the stool or vomit of an infected person. This can occur from person to person or from contact with a contaminated surface.  Follow these guidelines when caring for yourself at home:  · If symptoms are severe, rest at home for the next 24 hours or until you are feeling better.  · Wash your  hands with soap and water or use alcohol-based  to prevent the spread of infection. Wash your hands after touching anyone who is sick.  · Wash your hands or use alcohol-based  after using the toilet and before meals. Clean the toilet after each use.  Remember these tips when preparing food:  · People with diarrhea should not prepare or serve food to others. When preparing foods, wash your hands before and after.  · Wash your hands after using cutting boards, countertops, knives, or utensils that have been in contact with raw food.  · Keep uncooked meats away from cooked and ready-to-eat foods.  Medicine  You may use acetaminophen or NSAID medicines like ibuprofen or naproxen to control fever unless another medicine was given. If you have chronic liver or kidney disease, talk with your healthcare provider before using these medicines. Also talk with your provider if you've had a stomach ulcer or gastrointestinal bleeding. Don't give aspirin to anyone under 18 years of age who is ill with a fever. It may cause severe liver damage. Don't use NSAIDS is you are already taking one for another condition (like arthritis) or are on aspirin (such as for heart disease or after a stroke).  If medicine for vomiting or diarrhea are prescribed, take these only as directed. Do not take over-the-counter medicines for vomiting or diarrhea unless instructed by your healthcare provider.  Diet  Follow these guidelines for food:  · Water and liquids are important so you don't get dehydrated. Drink a small amount at a time or suck on ice chips if you are vomiting.  · If you eat, avoid fatty, greasy, spicy, or fried foods.  · Don't eat dairy if you have diarrhea. This can make diarrhea worse.  · Avoid tobacco, alcohol, and caffeine which may worsen symptoms.  During the first 24 hours (the first full day), follow the diet below:  · Beverages. Sports drinks, soft drinks without caffeine, ginger ale, mineral water (plain or  flavored), decaffeinated tea and coffee. If you are very dehydrated, sports drinks aren't a good choice. They have too much sugar and not enough electrolytes. In this case, commercially available products called oral rehydration solutions, are best.  · Soups. Eat clear broth, consommé, and bouillon.  · Desserts. Eat gelatin, popsicles, and fruit juice bars.  During the next 24 hours (the second day), you may add the following to the above:  · Hot cereal, plain toast, bread, rolls, and crackers  · Plain noodles, rice, mashed potatoes, chicken noodle or rice soup  · Unsweetened canned fruit (avoid pineapple), bananas  · Limit fat intake to less than 15 grams per day. Do this by avoiding margarine, butter, oils, mayonnaise, sauces, gravies, fried foods, peanut butter, meat, poultry, and fish.  · Limit fiber and avoid raw or cooked vegetables, fresh fruits (except bananas), and bran cereals.  · Limit caffeine and chocolate. Don't use spices or seasonings other than salt.  · Limit dairy products.  · Avoid alcohol.  During the next 24 hours:  · Gradually resume a normal diet as you feel better and your symptoms improve.  · If at any time it starts getting worse again, go back to clear liquids until you feel better.  Follow-up care  Follow up with your healthcare provider, or as advised. Call your provider if you don't get better within 24 hours or if diarrhea lasts more than a week. Also follow up if you are unable to keep down liquids and get dehydrated. If a stool (diarrhea) sample was taken, call as directed for the results.  Call 911  Call 911 if any of these occur:  · Trouble breathing  · Chest pain  · Confused  · Severe drowsiness or trouble awakening  · Fainting or loss of consciousness  · Rapid heart rate  · Seizure  · Stiff neck  When to seek medical advice  Call your healthcare provider right away if any of these occur:  · Abdominal pain that gets worse  · Continued vomiting (unable to keep liquids  down)  · Frequent diarrhea (more than 5 times a day)  · Blood in vomit or stool (black or red color)  · Dark urine, reduced urine output, or extreme thirst  · Weakness or dizziness  · Drowsiness  · Fever of 100.4°F (38°C) oral or higher that does not get better with fever medicine  · New rash  Date Last Reviewed: 1/3/2016  © 6151-3128 [employer]. [address] [place] [address], [place] [id number]. All rights reserved. This information is not intended as a substitute for professional medical care. Always follow your healthcare professional's instructions.

## 2020-11-10 ENCOUNTER — PATIENT MESSAGE (OUTPATIENT)
Dept: INTERNAL MEDICINE | Facility: CLINIC | Age: 50
End: 2020-11-10

## 2020-11-10 DIAGNOSIS — Z12.11 SCREENING FOR COLON CANCER: Primary | ICD-10-CM

## 2020-11-11 NOTE — TELEPHONE ENCOUNTER
Previously plan for colonoscopy with referral through [facility].  please provide patient with phone number to [facility] and pt can contact them to schedule his colonoscopy at his leisure

## 2020-11-23 ENCOUNTER — LAB VISIT (OUTPATIENT)
Dept: PRIMARY CARE CLINIC | Facility: OTHER | Age: 50
End: 2020-11-23
Attending: INTERNAL MEDICINE
Payer: COMMERCIAL

## 2020-11-23 DIAGNOSIS — Z03.818 ENCOUNTER FOR OBSERVATION FOR SUSPECTED EXPOSURE TO OTHER BIOLOGICAL AGENTS RULED OUT: ICD-10-CM

## 2020-11-23 PROCEDURE — U0003 INFECTIOUS AGENT DETECTION BY NUCLEIC ACID (DNA OR RNA); SEVERE ACUTE RESPIRATORY SYNDROME CORONAVIRUS 2 (SARS-COV-2) (CORONAVIRUS DISEASE [COVID-19]), AMPLIFIED PROBE TECHNIQUE, MAKING USE OF HIGH THROUGHPUT TECHNOLOGIES AS DESCRIBED BY CMS-2020-01-R: HCPCS

## 2020-11-25 LAB — SARS-COV-2 RNA RESP QL NAA+PROBE: NOT DETECTED

## 2020-11-30 ENCOUNTER — CLINICAL SUPPORT (OUTPATIENT)
Dept: URGENT CARE | Facility: CLINIC | Age: 50
End: 2020-11-30
Payer: COMMERCIAL

## 2020-11-30 DIAGNOSIS — Z20.822 EXPOSURE TO COVID-19 VIRUS: Primary | ICD-10-CM

## 2020-11-30 LAB
CTP QC/QA: YES
SARS-COV-2 RDRP RESP QL NAA+PROBE: NEGATIVE

## 2020-11-30 PROCEDURE — U0002 COVID-19 LAB TEST NON-CDC: HCPCS | Mod: QW,S$GLB,, | Performed by: INTERNAL MEDICINE

## 2020-11-30 PROCEDURE — U0002: ICD-10-PCS | Mod: QW,S$GLB,, | Performed by: INTERNAL MEDICINE

## 2020-12-04 ENCOUNTER — LAB VISIT (OUTPATIENT)
Dept: PRIMARY CARE CLINIC | Facility: OTHER | Age: 50
End: 2020-12-04
Attending: INTERNAL MEDICINE
Payer: COMMERCIAL

## 2020-12-04 DIAGNOSIS — Z03.818 ENCOUNTER FOR OBSERVATION FOR SUSPECTED EXPOSURE TO OTHER BIOLOGICAL AGENTS RULED OUT: ICD-10-CM

## 2020-12-04 PROCEDURE — U0003 INFECTIOUS AGENT DETECTION BY NUCLEIC ACID (DNA OR RNA); SEVERE ACUTE RESPIRATORY SYNDROME CORONAVIRUS 2 (SARS-COV-2) (CORONAVIRUS DISEASE [COVID-19]), AMPLIFIED PROBE TECHNIQUE, MAKING USE OF HIGH THROUGHPUT TECHNOLOGIES AS DESCRIBED BY CMS-2020-01-R: HCPCS

## 2020-12-07 LAB — SARS-COV-2 RNA RESP QL NAA+PROBE: NOT DETECTED

## 2020-12-08 ENCOUNTER — PATIENT OUTREACH (OUTPATIENT)
Dept: ADMINISTRATIVE | Facility: HOSPITAL | Age: 50
End: 2020-12-08

## 2020-12-17 ENCOUNTER — PATIENT OUTREACH (OUTPATIENT)
Dept: ADMINISTRATIVE | Facility: HOSPITAL | Age: 50
End: 2020-12-17

## 2021-01-08 ENCOUNTER — LAB VISIT (OUTPATIENT)
Dept: PRIMARY CARE CLINIC | Facility: OTHER | Age: 51
End: 2021-01-08
Attending: INTERNAL MEDICINE
Payer: COMMERCIAL

## 2021-01-08 DIAGNOSIS — Z03.818 ENCOUNTER FOR OBSERVATION FOR SUSPECTED EXPOSURE TO OTHER BIOLOGICAL AGENTS RULED OUT: ICD-10-CM

## 2021-01-08 PROCEDURE — U0003 INFECTIOUS AGENT DETECTION BY NUCLEIC ACID (DNA OR RNA); SEVERE ACUTE RESPIRATORY SYNDROME CORONAVIRUS 2 (SARS-COV-2) (CORONAVIRUS DISEASE [COVID-19]), AMPLIFIED PROBE TECHNIQUE, MAKING USE OF HIGH THROUGHPUT TECHNOLOGIES AS DESCRIBED BY CMS-2020-01-R: HCPCS

## 2021-01-09 LAB — SARS-COV-2 RNA RESP QL NAA+PROBE: NOT DETECTED

## 2021-04-16 ENCOUNTER — PATIENT MESSAGE (OUTPATIENT)
Dept: RESEARCH | Facility: HOSPITAL | Age: 51
End: 2021-04-16

## 2021-04-21 ENCOUNTER — CLINICAL SUPPORT (OUTPATIENT)
Dept: URGENT CARE | Facility: CLINIC | Age: 51
End: 2021-04-21
Payer: COMMERCIAL

## 2021-04-21 DIAGNOSIS — Z11.59 SCREENING FOR VIRAL DISEASE: Primary | ICD-10-CM

## 2021-04-21 PROCEDURE — 99211 PR OFFICE/OUTPT VISIT, EST, LEVL I: ICD-10-PCS | Mod: S$GLB,,, | Performed by: FAMILY MEDICINE

## 2021-04-21 PROCEDURE — 99211 OFF/OP EST MAY X REQ PHY/QHP: CPT | Mod: S$GLB,,, | Performed by: FAMILY MEDICINE

## 2021-04-21 PROCEDURE — U0005 INFEC AGEN DETEC AMPLI PROBE: HCPCS | Performed by: FAMILY MEDICINE

## 2021-04-21 PROCEDURE — U0003 INFECTIOUS AGENT DETECTION BY NUCLEIC ACID (DNA OR RNA); SEVERE ACUTE RESPIRATORY SYNDROME CORONAVIRUS 2 (SARS-COV-2) (CORONAVIRUS DISEASE [COVID-19]), AMPLIFIED PROBE TECHNIQUE, MAKING USE OF HIGH THROUGHPUT TECHNOLOGIES AS DESCRIBED BY CMS-2020-01-R: HCPCS | Performed by: FAMILY MEDICINE

## 2021-04-23 ENCOUNTER — CLINICAL SUPPORT (OUTPATIENT)
Dept: URGENT CARE | Facility: CLINIC | Age: 51
End: 2021-04-23
Payer: COMMERCIAL

## 2021-04-23 ENCOUNTER — TELEPHONE (OUTPATIENT)
Dept: URGENT CARE | Facility: CLINIC | Age: 51
End: 2021-04-23

## 2021-04-23 DIAGNOSIS — Z11.59 SCREENING FOR VIRAL DISEASE: Primary | ICD-10-CM

## 2021-04-23 LAB
CTP QC/QA: YES
SARS-COV-2 RDRP RESP QL NAA+PROBE: NEGATIVE
SARS-COV-2 RNA RESP QL NAA+PROBE: NOT DETECTED

## 2021-04-23 PROCEDURE — U0002: ICD-10-PCS | Mod: QW,S$GLB,, | Performed by: NURSE PRACTITIONER

## 2021-04-23 PROCEDURE — U0002 COVID-19 LAB TEST NON-CDC: HCPCS | Mod: QW,S$GLB,, | Performed by: NURSE PRACTITIONER

## 2021-06-05 NOTE — PROGRESS NOTES
C/o   Problem: At Risk for Falls  Goal: # Patient does not fall  Outcome: Outcome Not Met at Discharge (Non-Nursing Use Only)  Goal: # Takes action to control fall-related risks  Outcome: Outcome Not Met at Discharge (Non-Nursing Use Only)  Goal: # Verbalizes understanding of fall risk/precautions  Description: Document education using the patient education activity  Outcome: Outcome Not Met at Discharge (Non-Nursing Use Only)   Bilateral knee pain and weakness d/t pain and swelling.   Care Everywhere updates requested. Immunizations reconciled.  updated.

## 2021-06-09 ENCOUNTER — OFFICE VISIT (OUTPATIENT)
Dept: URGENT CARE | Facility: CLINIC | Age: 51
End: 2021-06-09
Payer: COMMERCIAL

## 2021-06-09 VITALS
BODY MASS INDEX: 30.46 KG/M2 | TEMPERATURE: 98 F | RESPIRATION RATE: 18 BRPM | HEART RATE: 85 BPM | SYSTOLIC BLOOD PRESSURE: 153 MMHG | WEIGHT: 245 LBS | OXYGEN SATURATION: 96 % | HEIGHT: 75 IN | DIASTOLIC BLOOD PRESSURE: 93 MMHG

## 2021-06-09 DIAGNOSIS — S90.30XA CONTUSION OF DORSUM OF FOOT: Primary | ICD-10-CM

## 2021-06-09 PROCEDURE — 73630 X-RAY EXAM OF FOOT: CPT | Mod: RT,S$GLB,, | Performed by: RADIOLOGY

## 2021-06-09 PROCEDURE — 99214 OFFICE O/P EST MOD 30 MIN: CPT | Mod: S$GLB,,, | Performed by: PHYSICIAN ASSISTANT

## 2021-06-09 PROCEDURE — 3008F BODY MASS INDEX DOCD: CPT | Mod: CPTII,S$GLB,, | Performed by: PHYSICIAN ASSISTANT

## 2021-06-09 PROCEDURE — 99214 PR OFFICE/OUTPT VISIT, EST, LEVL IV, 30-39 MIN: ICD-10-PCS | Mod: S$GLB,,, | Performed by: PHYSICIAN ASSISTANT

## 2021-06-09 PROCEDURE — 3008F PR BODY MASS INDEX (BMI) DOCUMENTED: ICD-10-PCS | Mod: CPTII,S$GLB,, | Performed by: PHYSICIAN ASSISTANT

## 2021-06-09 PROCEDURE — 73630 XR FOOT COMPLETE 3 VIEW RIGHT: ICD-10-PCS | Mod: RT,S$GLB,, | Performed by: RADIOLOGY

## 2021-06-09 RX ORDER — HYDROCODONE BITARTRATE AND ACETAMINOPHEN 5; 325 MG/1; MG/1
1 TABLET ORAL EVERY 6 HOURS PRN
Qty: 12 TABLET | Refills: 0 | Status: SHIPPED | OUTPATIENT
Start: 2021-06-09 | End: 2021-06-25

## 2021-06-25 ENCOUNTER — PATIENT MESSAGE (OUTPATIENT)
Dept: INTERNAL MEDICINE | Facility: CLINIC | Age: 51
End: 2021-06-25

## 2021-06-25 ENCOUNTER — OFFICE VISIT (OUTPATIENT)
Dept: INTERNAL MEDICINE | Facility: CLINIC | Age: 51
End: 2021-06-25
Attending: INTERNAL MEDICINE
Payer: COMMERCIAL

## 2021-06-25 ENCOUNTER — LAB VISIT (OUTPATIENT)
Dept: LAB | Facility: OTHER | Age: 51
End: 2021-06-25
Attending: INTERNAL MEDICINE
Payer: COMMERCIAL

## 2021-06-25 VITALS
DIASTOLIC BLOOD PRESSURE: 74 MMHG | BODY MASS INDEX: 30.65 KG/M2 | OXYGEN SATURATION: 97 % | HEIGHT: 75 IN | SYSTOLIC BLOOD PRESSURE: 118 MMHG | WEIGHT: 246.5 LBS | HEART RATE: 68 BPM

## 2021-06-25 DIAGNOSIS — R73.03 PRE-DIABETES: ICD-10-CM

## 2021-06-25 DIAGNOSIS — I25.10 CORONARY ARTERY DISEASE INVOLVING NATIVE CORONARY ARTERY OF NATIVE HEART WITHOUT ANGINA PECTORIS: ICD-10-CM

## 2021-06-25 DIAGNOSIS — I25.10 CORONARY ARTERY DISEASE INVOLVING NATIVE CORONARY ARTERY OF NATIVE HEART WITHOUT ANGINA PECTORIS: Primary | ICD-10-CM

## 2021-06-25 LAB
ALBUMIN SERPL BCP-MCNC: 4.1 G/DL (ref 3.5–5.2)
ALP SERPL-CCNC: 69 U/L (ref 55–135)
ALT SERPL W/O P-5'-P-CCNC: 65 U/L (ref 10–44)
ANION GAP SERPL CALC-SCNC: 10 MMOL/L (ref 8–16)
AST SERPL-CCNC: 38 U/L (ref 10–40)
BILIRUB SERPL-MCNC: 1.1 MG/DL (ref 0.1–1)
BUN SERPL-MCNC: 15 MG/DL (ref 6–20)
CALCIUM SERPL-MCNC: 9.2 MG/DL (ref 8.7–10.5)
CHLORIDE SERPL-SCNC: 108 MMOL/L (ref 95–110)
CO2 SERPL-SCNC: 23 MMOL/L (ref 23–29)
CREAT SERPL-MCNC: 0.9 MG/DL (ref 0.5–1.4)
EST. GFR  (AFRICAN AMERICAN): >60 ML/MIN/1.73 M^2
EST. GFR  (NON AFRICAN AMERICAN): >60 ML/MIN/1.73 M^2
ESTIMATED AVG GLUCOSE: 123 MG/DL (ref 68–131)
GLUCOSE SERPL-MCNC: 113 MG/DL (ref 70–110)
HBA1C MFR BLD: 5.9 % (ref 4–5.6)
POTASSIUM SERPL-SCNC: 4.5 MMOL/L (ref 3.5–5.1)
PROT SERPL-MCNC: 7.1 G/DL (ref 6–8.4)
SODIUM SERPL-SCNC: 141 MMOL/L (ref 136–145)

## 2021-06-25 PROCEDURE — 3008F PR BODY MASS INDEX (BMI) DOCUMENTED: ICD-10-PCS | Mod: CPTII,S$GLB,, | Performed by: INTERNAL MEDICINE

## 2021-06-25 PROCEDURE — 99999 PR PBB SHADOW E&M-EST. PATIENT-LVL III: CPT | Mod: PBBFAC,,, | Performed by: INTERNAL MEDICINE

## 2021-06-25 PROCEDURE — 1126F AMNT PAIN NOTED NONE PRSNT: CPT | Mod: S$GLB,,, | Performed by: INTERNAL MEDICINE

## 2021-06-25 PROCEDURE — 3008F BODY MASS INDEX DOCD: CPT | Mod: CPTII,S$GLB,, | Performed by: INTERNAL MEDICINE

## 2021-06-25 PROCEDURE — 99999 PR PBB SHADOW E&M-EST. PATIENT-LVL III: ICD-10-PCS | Mod: PBBFAC,,, | Performed by: INTERNAL MEDICINE

## 2021-06-25 PROCEDURE — 99214 OFFICE O/P EST MOD 30 MIN: CPT | Mod: S$GLB,,, | Performed by: INTERNAL MEDICINE

## 2021-06-25 PROCEDURE — 80053 COMPREHEN METABOLIC PANEL: CPT | Performed by: INTERNAL MEDICINE

## 2021-06-25 PROCEDURE — 36415 COLL VENOUS BLD VENIPUNCTURE: CPT | Performed by: INTERNAL MEDICINE

## 2021-06-25 PROCEDURE — 1126F PR PAIN SEVERITY QUANTIFIED, NO PAIN PRESENT: ICD-10-PCS | Mod: S$GLB,,, | Performed by: INTERNAL MEDICINE

## 2021-06-25 PROCEDURE — 83036 HEMOGLOBIN GLYCOSYLATED A1C: CPT | Performed by: INTERNAL MEDICINE

## 2021-06-25 PROCEDURE — 99214 PR OFFICE/OUTPT VISIT, EST, LEVL IV, 30-39 MIN: ICD-10-PCS | Mod: S$GLB,,, | Performed by: INTERNAL MEDICINE

## 2021-07-07 ENCOUNTER — OFFICE VISIT (OUTPATIENT)
Dept: DERMATOLOGY | Facility: CLINIC | Age: 51
End: 2021-07-07
Payer: COMMERCIAL

## 2021-07-07 DIAGNOSIS — L91.8 ACROCHORDON: ICD-10-CM

## 2021-07-07 DIAGNOSIS — Z12.83 SCREENING FOR SKIN CANCER: ICD-10-CM

## 2021-07-07 DIAGNOSIS — D22.9 MULTIPLE BENIGN NEVI: Primary | ICD-10-CM

## 2021-07-07 DIAGNOSIS — L81.4 LENTIGINES: ICD-10-CM

## 2021-07-07 DIAGNOSIS — D18.01 CHERRY ANGIOMA: ICD-10-CM

## 2021-07-07 DIAGNOSIS — L82.1 SK (SEBORRHEIC KERATOSIS): ICD-10-CM

## 2021-07-07 PROCEDURE — 99999 PR PBB SHADOW E&M-EST. PATIENT-LVL II: CPT | Mod: PBBFAC,,, | Performed by: DERMATOLOGY

## 2021-07-07 PROCEDURE — 1126F PR PAIN SEVERITY QUANTIFIED, NO PAIN PRESENT: ICD-10-PCS | Mod: S$GLB,,, | Performed by: DERMATOLOGY

## 2021-07-07 PROCEDURE — 1126F AMNT PAIN NOTED NONE PRSNT: CPT | Mod: S$GLB,,, | Performed by: DERMATOLOGY

## 2021-07-07 PROCEDURE — 99203 PR OFFICE/OUTPT VISIT, NEW, LEVL III, 30-44 MIN: ICD-10-PCS | Mod: S$GLB,,, | Performed by: DERMATOLOGY

## 2021-07-07 PROCEDURE — 99999 PR PBB SHADOW E&M-EST. PATIENT-LVL II: ICD-10-PCS | Mod: PBBFAC,,, | Performed by: DERMATOLOGY

## 2021-07-07 PROCEDURE — 99203 OFFICE O/P NEW LOW 30 MIN: CPT | Mod: S$GLB,,, | Performed by: DERMATOLOGY

## 2021-07-26 ENCOUNTER — PATIENT MESSAGE (OUTPATIENT)
Dept: INTERNAL MEDICINE | Facility: CLINIC | Age: 51
End: 2021-07-26

## 2021-08-15 ENCOUNTER — PATIENT MESSAGE (OUTPATIENT)
Dept: INTERNAL MEDICINE | Facility: CLINIC | Age: 51
End: 2021-08-15

## 2021-08-15 DIAGNOSIS — M25.532 LEFT WRIST PAIN: Primary | ICD-10-CM

## 2021-08-17 ENCOUNTER — TELEPHONE (OUTPATIENT)
Dept: ORTHOPEDICS | Facility: CLINIC | Age: 51
End: 2021-08-17

## 2021-08-17 DIAGNOSIS — R52 PAIN: Primary | ICD-10-CM

## 2021-08-19 ENCOUNTER — OFFICE VISIT (OUTPATIENT)
Dept: ORTHOPEDICS | Facility: CLINIC | Age: 51
End: 2021-08-19
Attending: INTERNAL MEDICINE
Payer: COMMERCIAL

## 2021-08-19 ENCOUNTER — HOSPITAL ENCOUNTER (OUTPATIENT)
Dept: RADIOLOGY | Facility: OTHER | Age: 51
Discharge: HOME | End: 2021-08-19
Attending: PHYSICIAN ASSISTANT
Payer: COMMERCIAL

## 2021-08-19 VITALS
WEIGHT: 246 LBS | HEART RATE: 69 BPM | DIASTOLIC BLOOD PRESSURE: 78 MMHG | BODY MASS INDEX: 30.59 KG/M2 | HEIGHT: 75 IN | SYSTOLIC BLOOD PRESSURE: 143 MMHG

## 2021-08-19 DIAGNOSIS — R52 PAIN: ICD-10-CM

## 2021-08-19 DIAGNOSIS — M65.4 RADIAL STYLOID TENOSYNOVITIS (DE QUERVAIN): Primary | ICD-10-CM

## 2021-08-19 DIAGNOSIS — M25.532 LEFT WRIST PAIN: ICD-10-CM

## 2021-08-19 PROCEDURE — 1160F PR REVIEW ALL MEDS BY PRESCRIBER/CLIN PHARMACIST DOCUMENTED: ICD-10-PCS | Mod: CPTII,S$GLB,, | Performed by: PHYSICIAN ASSISTANT

## 2021-08-19 PROCEDURE — 3044F PR MOST RECENT HEMOGLOBIN A1C LEVEL <7.0%: ICD-10-PCS | Mod: CPTII,S$GLB,, | Performed by: PHYSICIAN ASSISTANT

## 2021-08-19 PROCEDURE — 20550 NJX 1 TENDON SHEATH/LIGAMENT: CPT | Mod: LT,S$GLB,, | Performed by: PHYSICIAN ASSISTANT

## 2021-08-19 PROCEDURE — 73110 X-RAY EXAM OF WRIST: CPT | Mod: 26,LT,, | Performed by: RADIOLOGY

## 2021-08-19 PROCEDURE — 3077F SYST BP >= 140 MM HG: CPT | Mod: CPTII,S$GLB,, | Performed by: PHYSICIAN ASSISTANT

## 2021-08-19 PROCEDURE — 1159F PR MEDICATION LIST DOCUMENTED IN MEDICAL RECORD: ICD-10-PCS | Mod: CPTII,S$GLB,, | Performed by: PHYSICIAN ASSISTANT

## 2021-08-19 PROCEDURE — 3044F HG A1C LEVEL LT 7.0%: CPT | Mod: CPTII,S$GLB,, | Performed by: PHYSICIAN ASSISTANT

## 2021-08-19 PROCEDURE — 3008F BODY MASS INDEX DOCD: CPT | Mod: CPTII,S$GLB,, | Performed by: PHYSICIAN ASSISTANT

## 2021-08-19 PROCEDURE — 1160F RVW MEDS BY RX/DR IN RCRD: CPT | Mod: CPTII,S$GLB,, | Performed by: PHYSICIAN ASSISTANT

## 2021-08-19 PROCEDURE — 99999 PR PBB SHADOW E&M-EST. PATIENT-LVL IV: CPT | Mod: PBBFAC,,, | Performed by: PHYSICIAN ASSISTANT

## 2021-08-19 PROCEDURE — 99999 PR PBB SHADOW E&M-EST. PATIENT-LVL IV: ICD-10-PCS | Mod: PBBFAC,,, | Performed by: PHYSICIAN ASSISTANT

## 2021-08-19 PROCEDURE — 99213 OFFICE O/P EST LOW 20 MIN: CPT | Mod: 25,S$GLB,, | Performed by: PHYSICIAN ASSISTANT

## 2021-08-19 PROCEDURE — 99213 PR OFFICE/OUTPT VISIT, EST, LEVL III, 20-29 MIN: ICD-10-PCS | Mod: 25,S$GLB,, | Performed by: PHYSICIAN ASSISTANT

## 2021-08-19 PROCEDURE — 73110 XR WRIST COMPLETE 3 VIEWS LEFT: ICD-10-PCS | Mod: 26,LT,, | Performed by: RADIOLOGY

## 2021-08-19 PROCEDURE — 1125F AMNT PAIN NOTED PAIN PRSNT: CPT | Mod: CPTII,S$GLB,, | Performed by: PHYSICIAN ASSISTANT

## 2021-08-19 PROCEDURE — 3078F DIAST BP <80 MM HG: CPT | Mod: CPTII,S$GLB,, | Performed by: PHYSICIAN ASSISTANT

## 2021-08-19 PROCEDURE — 1159F MED LIST DOCD IN RCRD: CPT | Mod: CPTII,S$GLB,, | Performed by: PHYSICIAN ASSISTANT

## 2021-08-19 PROCEDURE — 3008F PR BODY MASS INDEX (BMI) DOCUMENTED: ICD-10-PCS | Mod: CPTII,S$GLB,, | Performed by: PHYSICIAN ASSISTANT

## 2021-08-19 PROCEDURE — 3078F PR MOST RECENT DIASTOLIC BLOOD PRESSURE < 80 MM HG: ICD-10-PCS | Mod: CPTII,S$GLB,, | Performed by: PHYSICIAN ASSISTANT

## 2021-08-19 PROCEDURE — 3077F PR MOST RECENT SYSTOLIC BLOOD PRESSURE >= 140 MM HG: ICD-10-PCS | Mod: CPTII,S$GLB,, | Performed by: PHYSICIAN ASSISTANT

## 2021-08-19 PROCEDURE — 73110 X-RAY EXAM OF WRIST: CPT | Mod: TC,FY,LT

## 2021-08-19 PROCEDURE — 1125F PR PAIN SEVERITY QUANTIFIED, PAIN PRESENT: ICD-10-PCS | Mod: CPTII,S$GLB,, | Performed by: PHYSICIAN ASSISTANT

## 2021-08-19 PROCEDURE — 20550 PR INJECT TENDON SHEATH/LIGAMENT: ICD-10-PCS | Mod: LT,S$GLB,, | Performed by: PHYSICIAN ASSISTANT

## 2021-08-19 RX ORDER — LIDOCAINE HYDROCHLORIDE 10 MG/ML
0.5 INJECTION, SOLUTION EPIDURAL; INFILTRATION; INTRACAUDAL; PERINEURAL
Status: COMPLETED | OUTPATIENT
Start: 2021-08-19 | End: 2021-08-19

## 2021-08-19 RX ORDER — DEXAMETHASONE SODIUM PHOSPHATE 4 MG/ML
4 INJECTION, SOLUTION INTRA-ARTICULAR; INTRALESIONAL; INTRAMUSCULAR; INTRAVENOUS; SOFT TISSUE
Status: COMPLETED | OUTPATIENT
Start: 2021-08-19 | End: 2021-08-19

## 2021-08-19 RX ADMIN — DEXAMETHASONE SODIUM PHOSPHATE 4 MG: 4 INJECTION, SOLUTION INTRA-ARTICULAR; INTRALESIONAL; INTRAMUSCULAR; INTRAVENOUS; SOFT TISSUE at 09:08

## 2021-08-19 RX ADMIN — LIDOCAINE HYDROCHLORIDE 5 MG: 10 INJECTION, SOLUTION EPIDURAL; INFILTRATION; INTRACAUDAL; PERINEURAL at 09:08

## 2021-11-02 ENCOUNTER — PATIENT MESSAGE (OUTPATIENT)
Dept: INTERNAL MEDICINE | Facility: CLINIC | Age: 51
End: 2021-11-02
Payer: COMMERCIAL

## 2021-11-04 ENCOUNTER — PATIENT MESSAGE (OUTPATIENT)
Dept: INTERNAL MEDICINE | Facility: CLINIC | Age: 51
End: 2021-11-04

## 2021-11-04 ENCOUNTER — IMMUNIZATION (OUTPATIENT)
Dept: INTERNAL MEDICINE | Facility: CLINIC | Age: 51
End: 2021-11-04
Payer: COMMERCIAL

## 2021-11-04 DIAGNOSIS — Z23 NEED FOR VACCINATION: Primary | ICD-10-CM

## 2021-11-04 PROCEDURE — 0013A COVID-19, MRNA, LNP-S, PF, 100 MCG/0.5 ML DOSE VACCINE: CPT | Mod: PBBFAC | Performed by: INTERNAL MEDICINE

## 2021-11-04 PROCEDURE — 91301 COVID-19, MRNA, LNP-S, PF, 100 MCG/0.5 ML DOSE VACCINE: CPT | Mod: PBBFAC | Performed by: INTERNAL MEDICINE

## 2021-11-15 ENCOUNTER — PATIENT MESSAGE (OUTPATIENT)
Dept: INTERNAL MEDICINE | Facility: CLINIC | Age: 51
End: 2021-11-15
Payer: COMMERCIAL

## 2021-11-15 DIAGNOSIS — S86.912S STRAIN OF LEFT KNEE, SEQUELA: Primary | ICD-10-CM

## 2021-11-18 DIAGNOSIS — M25.562 LEFT KNEE PAIN, UNSPECIFIED CHRONICITY: Primary | ICD-10-CM

## 2021-12-01 ENCOUNTER — APPOINTMENT (OUTPATIENT)
Dept: RADIOLOGY | Facility: OTHER | Age: 51
End: 2021-12-01
Attending: ORTHOPAEDIC SURGERY
Payer: COMMERCIAL

## 2021-12-01 ENCOUNTER — OFFICE VISIT (OUTPATIENT)
Dept: SPORTS MEDICINE | Facility: CLINIC | Age: 51
End: 2021-12-01
Attending: INTERNAL MEDICINE
Payer: COMMERCIAL

## 2021-12-01 VITALS
SYSTOLIC BLOOD PRESSURE: 125 MMHG | BODY MASS INDEX: 30.59 KG/M2 | WEIGHT: 246 LBS | DIASTOLIC BLOOD PRESSURE: 85 MMHG | HEIGHT: 75 IN

## 2021-12-01 DIAGNOSIS — M25.562 LEFT KNEE PAIN, UNSPECIFIED CHRONICITY: ICD-10-CM

## 2021-12-01 DIAGNOSIS — G89.29 CHRONIC PAIN OF LEFT KNEE: Primary | ICD-10-CM

## 2021-12-01 DIAGNOSIS — M25.562 CHRONIC PAIN OF LEFT KNEE: Primary | ICD-10-CM

## 2021-12-01 DIAGNOSIS — M22.8X2 PATELLAR TRACKING DISORDER, LEFT: ICD-10-CM

## 2021-12-01 PROCEDURE — 99204 OFFICE O/P NEW MOD 45 MIN: CPT | Mod: S$GLB,,, | Performed by: ORTHOPAEDIC SURGERY

## 2021-12-01 PROCEDURE — 73562 X-RAY EXAM OF KNEE 3: CPT | Mod: 26,RT,, | Performed by: RADIOLOGY

## 2021-12-01 PROCEDURE — 73564 XR KNEE ORTHO LEFT WITH FLEXION: ICD-10-PCS | Mod: 26,LT,, | Performed by: RADIOLOGY

## 2021-12-01 PROCEDURE — 73564 X-RAY EXAM KNEE 4 OR MORE: CPT | Mod: 26,LT,, | Performed by: RADIOLOGY

## 2021-12-01 PROCEDURE — 73564 X-RAY EXAM KNEE 4 OR MORE: CPT | Mod: TC,LT

## 2021-12-01 PROCEDURE — 99999 PR PBB SHADOW E&M-EST. PATIENT-LVL III: CPT | Mod: PBBFAC,,, | Performed by: ORTHOPAEDIC SURGERY

## 2021-12-01 PROCEDURE — 99204 PR OFFICE/OUTPT VISIT, NEW, LEVL IV, 45-59 MIN: ICD-10-PCS | Mod: S$GLB,,, | Performed by: ORTHOPAEDIC SURGERY

## 2021-12-01 PROCEDURE — 97110 PR THERAPEUTIC EXERCISES: ICD-10-PCS | Mod: S$GLB,,, | Performed by: ORTHOPAEDIC SURGERY

## 2021-12-01 PROCEDURE — 73562 XR KNEE ORTHO LEFT WITH FLEXION: ICD-10-PCS | Mod: 26,RT,, | Performed by: RADIOLOGY

## 2021-12-01 PROCEDURE — 99999 PR PBB SHADOW E&M-EST. PATIENT-LVL III: ICD-10-PCS | Mod: PBBFAC,,, | Performed by: ORTHOPAEDIC SURGERY

## 2021-12-01 PROCEDURE — 97110 THERAPEUTIC EXERCISES: CPT | Mod: S$GLB,,, | Performed by: ORTHOPAEDIC SURGERY

## 2021-12-01 RX ORDER — MELOXICAM 15 MG/1
15 TABLET ORAL DAILY
Qty: 30 TABLET | Refills: 0 | Status: SHIPPED | OUTPATIENT
Start: 2021-12-01 | End: 2024-01-11

## 2021-12-06 ENCOUNTER — PATIENT MESSAGE (OUTPATIENT)
Dept: INTERNAL MEDICINE | Facility: CLINIC | Age: 51
End: 2021-12-06
Payer: COMMERCIAL

## 2021-12-07 ENCOUNTER — IMMUNIZATION (OUTPATIENT)
Dept: INTERNAL MEDICINE | Facility: CLINIC | Age: 51
End: 2021-12-07
Payer: COMMERCIAL

## 2021-12-07 DIAGNOSIS — Z23 NEED FOR VACCINATION: Primary | ICD-10-CM

## 2021-12-07 PROCEDURE — 0012A COVID-19, MRNA, LNP-S, PF, 100 MCG/0.5 ML DOSE VACCINE: CPT | Mod: PBBFAC | Performed by: INTERNAL MEDICINE

## 2021-12-16 ENCOUNTER — PATIENT MESSAGE (OUTPATIENT)
Dept: INTERNAL MEDICINE | Facility: CLINIC | Age: 51
End: 2021-12-16

## 2021-12-16 ENCOUNTER — OFFICE VISIT (OUTPATIENT)
Dept: INTERNAL MEDICINE | Facility: CLINIC | Age: 51
End: 2021-12-16
Attending: INTERNAL MEDICINE
Payer: COMMERCIAL

## 2021-12-16 ENCOUNTER — LAB VISIT (OUTPATIENT)
Dept: LAB | Facility: OTHER | Age: 51
End: 2021-12-16
Attending: INTERNAL MEDICINE
Payer: COMMERCIAL

## 2021-12-16 VITALS
OXYGEN SATURATION: 98 % | BODY MASS INDEX: 31.17 KG/M2 | HEIGHT: 75 IN | WEIGHT: 250.69 LBS | DIASTOLIC BLOOD PRESSURE: 82 MMHG | HEART RATE: 71 BPM | SYSTOLIC BLOOD PRESSURE: 123 MMHG

## 2021-12-16 DIAGNOSIS — E78.00 PURE HYPERCHOLESTEROLEMIA: ICD-10-CM

## 2021-12-16 DIAGNOSIS — D64.9 ANEMIA, UNSPECIFIED TYPE: Primary | ICD-10-CM

## 2021-12-16 DIAGNOSIS — Z12.5 PROSTATE CANCER SCREENING: ICD-10-CM

## 2021-12-16 DIAGNOSIS — Z71.84 TRAVEL ADVICE ENCOUNTER: ICD-10-CM

## 2021-12-16 DIAGNOSIS — I25.10 CORONARY ARTERY DISEASE INVOLVING NATIVE CORONARY ARTERY OF NATIVE HEART WITHOUT ANGINA PECTORIS: ICD-10-CM

## 2021-12-16 DIAGNOSIS — Z00.00 ANNUAL PHYSICAL EXAM: ICD-10-CM

## 2021-12-16 DIAGNOSIS — Z00.00 ANNUAL PHYSICAL EXAM: Primary | ICD-10-CM

## 2021-12-16 LAB
ALBUMIN SERPL BCP-MCNC: 4.4 G/DL (ref 3.5–5.2)
ALP SERPL-CCNC: 70 U/L (ref 55–135)
ALT SERPL W/O P-5'-P-CCNC: 50 U/L (ref 10–44)
ANION GAP SERPL CALC-SCNC: 11 MMOL/L (ref 8–16)
AST SERPL-CCNC: 32 U/L (ref 10–40)
BASOPHILS # BLD AUTO: 0.02 K/UL (ref 0–0.2)
BASOPHILS NFR BLD: 0.4 % (ref 0–1.9)
BILIRUB SERPL-MCNC: 0.8 MG/DL (ref 0.1–1)
BUN SERPL-MCNC: 12 MG/DL (ref 6–20)
CALCIUM SERPL-MCNC: 9.3 MG/DL (ref 8.7–10.5)
CHLORIDE SERPL-SCNC: 107 MMOL/L (ref 95–110)
CHOLEST SERPL-MCNC: 145 MG/DL (ref 120–199)
CHOLEST/HDLC SERPL: 3.9 {RATIO} (ref 2–5)
CO2 SERPL-SCNC: 24 MMOL/L (ref 23–29)
COMPLEXED PSA SERPL-MCNC: 0.47 NG/ML (ref 0–4)
CREAT SERPL-MCNC: 0.9 MG/DL (ref 0.5–1.4)
DIFFERENTIAL METHOD: ABNORMAL
EOSINOPHIL # BLD AUTO: 0.1 K/UL (ref 0–0.5)
EOSINOPHIL NFR BLD: 1.4 % (ref 0–8)
ERYTHROCYTE [DISTWIDTH] IN BLOOD BY AUTOMATED COUNT: 11.6 % (ref 11.5–14.5)
EST. GFR  (AFRICAN AMERICAN): >60 ML/MIN/1.73 M^2
EST. GFR  (NON AFRICAN AMERICAN): >60 ML/MIN/1.73 M^2
ESTIMATED AVG GLUCOSE: 128 MG/DL (ref 68–131)
GLUCOSE SERPL-MCNC: 113 MG/DL (ref 70–110)
HBA1C MFR BLD: 6.1 % (ref 4–5.6)
HCT VFR BLD AUTO: 41.2 % (ref 40–54)
HDLC SERPL-MCNC: 37 MG/DL (ref 40–75)
HDLC SERPL: 25.5 % (ref 20–50)
HGB BLD-MCNC: 13.6 G/DL (ref 14–18)
IMM GRANULOCYTES # BLD AUTO: 0.01 K/UL (ref 0–0.04)
IMM GRANULOCYTES NFR BLD AUTO: 0.2 % (ref 0–0.5)
LDLC SERPL CALC-MCNC: 86.4 MG/DL (ref 63–159)
LYMPHOCYTES # BLD AUTO: 2.1 K/UL (ref 1–4.8)
LYMPHOCYTES NFR BLD: 40.7 % (ref 18–48)
MCH RBC QN AUTO: 31.3 PG (ref 27–31)
MCHC RBC AUTO-ENTMCNC: 33 G/DL (ref 32–36)
MCV RBC AUTO: 95 FL (ref 82–98)
MONOCYTES # BLD AUTO: 0.4 K/UL (ref 0.3–1)
MONOCYTES NFR BLD: 8.1 % (ref 4–15)
NEUTROPHILS # BLD AUTO: 2.5 K/UL (ref 1.8–7.7)
NEUTROPHILS NFR BLD: 49.2 % (ref 38–73)
NONHDLC SERPL-MCNC: 108 MG/DL
NRBC BLD-RTO: 0 /100 WBC
PLATELET # BLD AUTO: 292 K/UL (ref 150–450)
PMV BLD AUTO: 8.8 FL (ref 9.2–12.9)
POTASSIUM SERPL-SCNC: 4.5 MMOL/L (ref 3.5–5.1)
PROT SERPL-MCNC: 7.1 G/DL (ref 6–8.4)
RBC # BLD AUTO: 4.35 M/UL (ref 4.6–6.2)
SODIUM SERPL-SCNC: 142 MMOL/L (ref 136–145)
TRIGL SERPL-MCNC: 108 MG/DL (ref 30–150)
TSH SERPL DL<=0.005 MIU/L-ACNC: 1.24 UIU/ML (ref 0.4–4)
WBC # BLD AUTO: 5.08 K/UL (ref 3.9–12.7)

## 2021-12-16 PROCEDURE — 99396 PREV VISIT EST AGE 40-64: CPT | Mod: S$GLB,,, | Performed by: INTERNAL MEDICINE

## 2021-12-16 PROCEDURE — 99999 PR PBB SHADOW E&M-EST. PATIENT-LVL III: ICD-10-PCS | Mod: PBBFAC,,, | Performed by: INTERNAL MEDICINE

## 2021-12-16 PROCEDURE — 83036 HEMOGLOBIN GLYCOSYLATED A1C: CPT | Performed by: INTERNAL MEDICINE

## 2021-12-16 PROCEDURE — 84153 ASSAY OF PSA TOTAL: CPT | Performed by: INTERNAL MEDICINE

## 2021-12-16 PROCEDURE — 85025 COMPLETE CBC W/AUTO DIFF WBC: CPT | Performed by: INTERNAL MEDICINE

## 2021-12-16 PROCEDURE — 80053 COMPREHEN METABOLIC PANEL: CPT | Performed by: INTERNAL MEDICINE

## 2021-12-16 PROCEDURE — 36415 COLL VENOUS BLD VENIPUNCTURE: CPT | Performed by: INTERNAL MEDICINE

## 2021-12-16 PROCEDURE — 99396 PR PREVENTIVE VISIT,EST,40-64: ICD-10-PCS | Mod: S$GLB,,, | Performed by: INTERNAL MEDICINE

## 2021-12-16 PROCEDURE — 80061 LIPID PANEL: CPT | Performed by: INTERNAL MEDICINE

## 2021-12-16 PROCEDURE — 84443 ASSAY THYROID STIM HORMONE: CPT | Performed by: INTERNAL MEDICINE

## 2021-12-16 PROCEDURE — 99999 PR PBB SHADOW E&M-EST. PATIENT-LVL III: CPT | Mod: PBBFAC,,, | Performed by: INTERNAL MEDICINE

## 2021-12-20 ENCOUNTER — CLINICAL SUPPORT (OUTPATIENT)
Dept: INTERNAL MEDICINE | Facility: CLINIC | Age: 51
End: 2021-12-20
Payer: COMMERCIAL

## 2021-12-20 ENCOUNTER — PATIENT MESSAGE (OUTPATIENT)
Dept: INTERNAL MEDICINE | Facility: CLINIC | Age: 51
End: 2021-12-20
Payer: COMMERCIAL

## 2021-12-20 DIAGNOSIS — Z71.84 TRAVEL ADVICE ENCOUNTER: ICD-10-CM

## 2021-12-20 PROCEDURE — U0005 INFEC AGEN DETEC AMPLI PROBE: HCPCS | Performed by: INTERNAL MEDICINE

## 2021-12-20 PROCEDURE — U0003 INFECTIOUS AGENT DETECTION BY NUCLEIC ACID (DNA OR RNA); SEVERE ACUTE RESPIRATORY SYNDROME CORONAVIRUS 2 (SARS-COV-2) (CORONAVIRUS DISEASE [COVID-19]), AMPLIFIED PROBE TECHNIQUE, MAKING USE OF HIGH THROUGHPUT TECHNOLOGIES AS DESCRIBED BY CMS-2020-01-R: HCPCS | Performed by: INTERNAL MEDICINE

## 2021-12-21 LAB
SARS-COV-2 RNA RESP QL NAA+PROBE: NOT DETECTED
SARS-COV-2- CYCLE NUMBER: NORMAL

## 2021-12-22 ENCOUNTER — PATIENT MESSAGE (OUTPATIENT)
Dept: INTERNAL MEDICINE | Facility: CLINIC | Age: 51
End: 2021-12-22
Payer: COMMERCIAL

## 2022-01-06 ENCOUNTER — LAB VISIT (OUTPATIENT)
Dept: LAB | Facility: OTHER | Age: 52
End: 2022-01-06
Attending: INTERNAL MEDICINE
Payer: COMMERCIAL

## 2022-01-06 DIAGNOSIS — D64.9 ANEMIA, UNSPECIFIED TYPE: ICD-10-CM

## 2022-01-06 LAB
BASOPHILS # BLD AUTO: 0.03 K/UL (ref 0–0.2)
BASOPHILS NFR BLD: 0.4 % (ref 0–1.9)
DIFFERENTIAL METHOD: ABNORMAL
EOSINOPHIL # BLD AUTO: 0.1 K/UL (ref 0–0.5)
EOSINOPHIL NFR BLD: 0.7 % (ref 0–8)
ERYTHROCYTE [DISTWIDTH] IN BLOOD BY AUTOMATED COUNT: 11.7 % (ref 11.5–14.5)
FERRITIN SERPL-MCNC: 66 NG/ML (ref 20–300)
HCT VFR BLD AUTO: 39.8 % (ref 40–54)
HGB BLD-MCNC: 13.3 G/DL (ref 14–18)
IMM GRANULOCYTES # BLD AUTO: 0.02 K/UL (ref 0–0.04)
IMM GRANULOCYTES NFR BLD AUTO: 0.3 % (ref 0–0.5)
IRON SERPL-MCNC: 150 UG/DL (ref 45–160)
LYMPHOCYTES # BLD AUTO: 2.7 K/UL (ref 1–4.8)
LYMPHOCYTES NFR BLD: 39.6 % (ref 18–48)
MCH RBC QN AUTO: 31.7 PG (ref 27–31)
MCHC RBC AUTO-ENTMCNC: 33.4 G/DL (ref 32–36)
MCV RBC AUTO: 95 FL (ref 82–98)
MONOCYTES # BLD AUTO: 0.6 K/UL (ref 0.3–1)
MONOCYTES NFR BLD: 8.9 % (ref 4–15)
NEUTROPHILS # BLD AUTO: 3.4 K/UL (ref 1.8–7.7)
NEUTROPHILS NFR BLD: 50.1 % (ref 38–73)
NRBC BLD-RTO: 0 /100 WBC
PLATELET # BLD AUTO: 275 K/UL (ref 150–450)
PMV BLD AUTO: 9.6 FL (ref 9.2–12.9)
RBC # BLD AUTO: 4.19 M/UL (ref 4.6–6.2)
RETICS/RBC NFR AUTO: 1.7 % (ref 0.4–2)
SATURATED IRON: 37 % (ref 20–50)
TOTAL IRON BINDING CAPACITY: 403 UG/DL (ref 250–450)
TRANSFERRIN SERPL-MCNC: 272 MG/DL (ref 200–375)
VIT B12 SERPL-MCNC: 338 PG/ML (ref 210–950)
WBC # BLD AUTO: 6.72 K/UL (ref 3.9–12.7)

## 2022-01-06 PROCEDURE — 36415 COLL VENOUS BLD VENIPUNCTURE: CPT | Performed by: INTERNAL MEDICINE

## 2022-01-06 PROCEDURE — 85025 COMPLETE CBC W/AUTO DIFF WBC: CPT | Performed by: INTERNAL MEDICINE

## 2022-01-06 PROCEDURE — 82607 VITAMIN B-12: CPT | Performed by: INTERNAL MEDICINE

## 2022-01-06 PROCEDURE — 84466 ASSAY OF TRANSFERRIN: CPT | Performed by: INTERNAL MEDICINE

## 2022-01-06 PROCEDURE — 82728 ASSAY OF FERRITIN: CPT | Performed by: INTERNAL MEDICINE

## 2022-01-06 PROCEDURE — 85045 AUTOMATED RETICULOCYTE COUNT: CPT | Performed by: INTERNAL MEDICINE

## 2022-06-16 ENCOUNTER — OFFICE VISIT (OUTPATIENT)
Dept: INTERNAL MEDICINE | Facility: CLINIC | Age: 52
End: 2022-06-16
Attending: INTERNAL MEDICINE
Payer: COMMERCIAL

## 2022-06-16 ENCOUNTER — HOSPITAL ENCOUNTER (OUTPATIENT)
Dept: CARDIOLOGY | Facility: OTHER | Age: 52
Discharge: HOME | End: 2022-06-16
Attending: INTERNAL MEDICINE
Payer: COMMERCIAL

## 2022-06-16 ENCOUNTER — TELEPHONE (OUTPATIENT)
Dept: INTERNAL MEDICINE | Facility: CLINIC | Age: 52
End: 2022-06-16

## 2022-06-16 VITALS
SYSTOLIC BLOOD PRESSURE: 110 MMHG | WEIGHT: 242.5 LBS | HEART RATE: 63 BPM | HEIGHT: 75 IN | DIASTOLIC BLOOD PRESSURE: 72 MMHG | OXYGEN SATURATION: 97 % | BODY MASS INDEX: 30.15 KG/M2

## 2022-06-16 DIAGNOSIS — R07.2 PRECORDIAL PAIN: ICD-10-CM

## 2022-06-16 DIAGNOSIS — D64.9 ANEMIA, UNSPECIFIED TYPE: Primary | ICD-10-CM

## 2022-06-16 DIAGNOSIS — Z12.5 PROSTATE CANCER SCREENING: ICD-10-CM

## 2022-06-16 DIAGNOSIS — R73.03 PRE-DIABETES: ICD-10-CM

## 2022-06-16 DIAGNOSIS — I25.10 CORONARY ARTERY DISEASE INVOLVING NATIVE CORONARY ARTERY OF NATIVE HEART WITHOUT ANGINA PECTORIS: ICD-10-CM

## 2022-06-16 PROCEDURE — 3008F PR BODY MASS INDEX (BMI) DOCUMENTED: ICD-10-PCS | Mod: CPTII,S$GLB,, | Performed by: INTERNAL MEDICINE

## 2022-06-16 PROCEDURE — 3078F PR MOST RECENT DIASTOLIC BLOOD PRESSURE < 80 MM HG: ICD-10-PCS | Mod: CPTII,S$GLB,, | Performed by: INTERNAL MEDICINE

## 2022-06-16 PROCEDURE — 3074F SYST BP LT 130 MM HG: CPT | Mod: CPTII,S$GLB,, | Performed by: INTERNAL MEDICINE

## 2022-06-16 PROCEDURE — 99214 OFFICE O/P EST MOD 30 MIN: CPT | Mod: S$GLB,,, | Performed by: INTERNAL MEDICINE

## 2022-06-16 PROCEDURE — 99999 PR PBB SHADOW E&M-EST. PATIENT-LVL III: CPT | Mod: PBBFAC,,, | Performed by: INTERNAL MEDICINE

## 2022-06-16 PROCEDURE — 1159F PR MEDICATION LIST DOCUMENTED IN MEDICAL RECORD: ICD-10-PCS | Mod: CPTII,S$GLB,, | Performed by: INTERNAL MEDICINE

## 2022-06-16 PROCEDURE — 3008F BODY MASS INDEX DOCD: CPT | Mod: CPTII,S$GLB,, | Performed by: INTERNAL MEDICINE

## 2022-06-16 PROCEDURE — 3078F DIAST BP <80 MM HG: CPT | Mod: CPTII,S$GLB,, | Performed by: INTERNAL MEDICINE

## 2022-06-16 PROCEDURE — 99999 PR PBB SHADOW E&M-EST. PATIENT-LVL III: ICD-10-PCS | Mod: PBBFAC,,, | Performed by: INTERNAL MEDICINE

## 2022-06-16 PROCEDURE — 3074F PR MOST RECENT SYSTOLIC BLOOD PRESSURE < 130 MM HG: ICD-10-PCS | Mod: CPTII,S$GLB,, | Performed by: INTERNAL MEDICINE

## 2022-06-16 PROCEDURE — 1159F MED LIST DOCD IN RCRD: CPT | Mod: CPTII,S$GLB,, | Performed by: INTERNAL MEDICINE

## 2022-06-16 PROCEDURE — 99214 PR OFFICE/OUTPT VISIT, EST, LEVL IV, 30-39 MIN: ICD-10-PCS | Mod: S$GLB,,, | Performed by: INTERNAL MEDICINE

## 2022-06-16 NOTE — TELEPHONE ENCOUNTER
Pt scheduled for both but wasn't sure if Dr. Salazar was going to order the echo stress test for treadmill or bicycle and that he would call around to the cardio depts. Pt asked that I double check with Dr. Salazar about this matter. Dr. Salazar stated that he wanted pt to do the echo stress test with the treadmill but n/a. He wanted pt to get the best test that's why he chose the echo stress test with the medication because this test is the best test.      ----- Message from Basilio Salazar MD sent at 6/16/2022  9:24 AM CDT -----  Please contact patient to schedule EKG and stress test as this was ordered after his appointment.  Thanks.

## 2022-06-16 NOTE — Clinical Note
Please contact patient to schedule EKG and stress test as this was ordered after his appointment.  Thanks.

## 2022-06-16 NOTE — PROGRESS NOTES
Subjective:       Patient ID: Michael Lowe is a 51 y.o. male.    Chief Complaint: Back Pain (6mth f/u)    Here for routine f/u    Contracted COVID approximately 60 weeks ago while traveling.  Overall no complications turned infection but does report some decreased stamina since then.  Denies chest pain with exertion, chest pain at rest, lower extremity edema, PND, orthopnea, pleuritic chest pain, night sweats, fever chills, productive cough.  He does note an occasional left-sided chest twinge that can occur at random last seconds at a time, nonradiating. He has chronic left knee pain that limits his ability to run.         ### cervical DJD ###  C5-C6 herniated disc. Ulnar and medial nerve entrapment s/p scar tissues removal from elbow and carpal tunnel release. He has not regained full fine motor control but symptoms are manageable. No significant pain issues.       ### CAD non obstructive  ###  4/18/18 Calcium Ct score- Agatston score 101-400: Moderate plaque burden. High cardiovascular disease risk.  He is tolerating atorvastatin 40 mg.          LDLCALC                  86.4                12/16/2021 09:50 AM        LDLCALC                  91.0                10/08/2020 08:39 AM        LDLCALC                  97.8                01/24/2020 09:35 AM        LDLCALC                  80.0                05/23/2019 09:40 AM        LDLCALC                  91.6                06/12/2018 08:05 AM        LDLCALC                  204.4 (H)           04/18/2018 09:32 AM        LDLCALC                  181.8 (H)           06/30/2017 11:39 AM        LDLCALC                  188.8 (H)           02/04/2016 11:42 AM        LDLCALC                  161.0 (H)           08/19/2013 08:36 AM        LDLCALC                  175.0 (H)           07/11/2012 09:20 AM           Review of Systems   Constitutional: Negative for appetite change, chills, fever and unexpected weight change.   HENT: Negative for hearing loss, sore  "throat and trouble swallowing.    Eyes: Negative for visual disturbance.   Respiratory: Positive for shortness of breath. Negative for cough and chest tightness.    Cardiovascular: Positive for chest pain. Negative for leg swelling.   Gastrointestinal: Negative for abdominal pain, blood in stool, constipation, diarrhea, nausea and vomiting.   Endocrine: Negative for polydipsia and polyuria.   Genitourinary: Negative for decreased urine volume, difficulty urinating, dysuria, frequency and urgency.   Musculoskeletal: Positive for arthralgias. Negative for gait problem.   Skin: Negative for rash.   Neurological: Negative for dizziness and numbness.   Psychiatric/Behavioral: The patient is not nervous/anxious.        Objective:      Vitals:    06/16/22 0835   BP: 110/72   Pulse: 63   SpO2: 97%   Weight: 110 kg (242 lb 8.1 oz)   Height: 6' 3" (1.905 m)      Physical Exam  Constitutional:       General: He is not in acute distress.     Appearance: He is well-developed.   HENT:      Head: Normocephalic and atraumatic.      Mouth/Throat:      Pharynx: No oropharyngeal exudate.   Eyes:      General: No scleral icterus.     Conjunctiva/sclera: Conjunctivae normal.      Pupils: Pupils are equal, round, and reactive to light.   Neck:      Thyroid: No thyromegaly.   Cardiovascular:      Rate and Rhythm: Normal rate and regular rhythm.      Heart sounds: Normal heart sounds. No murmur heard.  Pulmonary:      Effort: Pulmonary effort is normal.      Breath sounds: Normal breath sounds. No wheezing or rales.   Abdominal:      General: There is no distension.      Palpations: Abdomen is soft.      Tenderness: There is no abdominal tenderness.   Musculoskeletal:         General: No tenderness.   Lymphadenopathy:      Cervical: No cervical adenopathy.   Skin:     General: Skin is warm and dry.   Neurological:      Mental Status: He is alert and oriented to person, place, and time.   Psychiatric:         Behavior: Behavior normal.      "    Assessment:       1. Anemia, unspecified type    2. Prostate cancer screening    3. Pre-diabetes    4. Coronary artery disease involving native coronary artery of native heart without angina pectoris    5. Precordial pain        Plan:       Michael was seen today for back pain.    Diagnoses and all orders for this visit:    Anemia, unspecified type  -     CBC Auto Differential; Future  -     Reticulocytes; Future  -     Vitamin B12; Future  -     Ferritin; Future  -     Iron and TIBC; Future    Prostate cancer screening  -     PSA, Screening; Future    Pre-diabetes  -     Hemoglobin A1C; Future    Coronary artery disease involving native coronary artery of native heart without angina pectoris    Precordial pain  -     Stress Echo Which stress agent will be used? Pharmacological; Color Flow Doppler? No; Future           Basilio Blum MD  Internal Medicine-[facility]        Side effects of medication(s) were discussed in detail and patient voiced understanding.  Patient will call back for any issues or complications.

## 2022-06-22 ENCOUNTER — HOSPITAL ENCOUNTER (OUTPATIENT)
Dept: CARDIOLOGY | Facility: HOSPITAL | Age: 52
Discharge: HOME | End: 2022-06-22
Attending: INTERNAL MEDICINE
Payer: COMMERCIAL

## 2022-06-22 VITALS
RESPIRATION RATE: 18 BRPM | SYSTOLIC BLOOD PRESSURE: 127 MMHG | WEIGHT: 240 LBS | HEIGHT: 75 IN | HEART RATE: 61 BPM | BODY MASS INDEX: 29.84 KG/M2 | DIASTOLIC BLOOD PRESSURE: 72 MMHG

## 2022-06-22 DIAGNOSIS — R07.2 PRECORDIAL PAIN: ICD-10-CM

## 2022-06-22 LAB
ASCENDING AORTA: 2.99 CM
BSA FOR ECHO PROCEDURE: 2.4 M2
CV ECHO LV RWT: 0.32 CM
CV STRESS BASE HR: 61 BPM
DIASTOLIC BLOOD PRESSURE: 72 MMHG
DOP CALC LVOT AREA: 4 CM2
DOP CALC LVOT DIAMETER: 2.26 CM
DOP CALC LVOT PEAK VEL: 1.45 M/S
DOP CALC LVOT STROKE VOLUME: 114.43 CM3
DOP CALCLVOT PEAK VEL VTI: 28.54 CM
E WAVE DECELERATION TIME: 219.65 MSEC
E/A RATIO: 1.69
E/E' RATIO: 5.43 M/S
ECHO LV POSTERIOR WALL: 0.88 CM (ref 0.6–1.1)
EJECTION FRACTION: 65 %
FRACTIONAL SHORTENING: 36 % (ref 28–44)
INTERVENTRICULAR SEPTUM: 0.8 CM (ref 0.6–1.1)
IVRT: 82.78 MSEC
LA MAJOR: 5.22 CM
LA MINOR: 5.23 CM
LA WIDTH: 4.68 CM
LEFT ATRIUM SIZE: 3.75 CM
LEFT ATRIUM VOLUME INDEX MOD: 24.7 ML/M2
LEFT ATRIUM VOLUME INDEX: 32.9 ML/M2
LEFT ATRIUM VOLUME MOD: 58.45 CM3
LEFT ATRIUM VOLUME: 77.94 CM3
LEFT INTERNAL DIMENSION IN SYSTOLE: 3.5 CM (ref 2.1–4)
LEFT VENTRICLE DIASTOLIC VOLUME INDEX: 61.08 ML/M2
LEFT VENTRICLE DIASTOLIC VOLUME: 144.77 ML
LEFT VENTRICLE MASS INDEX: 71 G/M2
LEFT VENTRICLE SYSTOLIC VOLUME INDEX: 21.4 ML/M2
LEFT VENTRICLE SYSTOLIC VOLUME: 50.82 ML
LEFT VENTRICULAR INTERNAL DIMENSION IN DIASTOLE: 5.46 CM (ref 3.5–6)
LEFT VENTRICULAR MASS: 168.02 G
LV LATERAL E/E' RATIO: 5.07 M/S
LV SEPTAL E/E' RATIO: 5.85 M/S
MV A" WAVE DURATION": 7.99 MSEC
MV PEAK A VEL: 0.45 M/S
MV PEAK E VEL: 0.76 M/S
MV STENOSIS PRESSURE HALF TIME: 63.7 MS
MV VALVE AREA P 1/2 METHOD: 3.45 CM2
OHS CV CPX 1 MINUTE RECOVERY HEART RATE: 150 BPM
OHS CV CPX 85 PERCENT MAX PREDICTED HEART RATE MALE: 145
OHS CV CPX MAX PREDICTED HEART RATE: 170
OHS CV CPX PATIENT IS FEMALE: 0
OHS CV CPX PATIENT IS MALE: 1
OHS CV CPX PEAK DIASTOLIC BLOOD PRESSURE: 52 MMHG
OHS CV CPX PEAK HEAR RATE: 151 BPM
OHS CV CPX PEAK RATE PRESSURE PRODUCT: ABNORMAL
OHS CV CPX PEAK SYSTOLIC BLOOD PRESSURE: 130 MMHG
OHS CV CPX PERCENT MAX PREDICTED HEART RATE ACHIEVED: 89
OHS CV CPX RATE PRESSURE PRODUCT PRESENTING: 7747
PISA TR MAX VEL: 2.35 M/S
PULM VEIN S/D RATIO: 0.7
PV PEAK D VEL: 0.69 M/S
PV PEAK S VEL: 0.48 M/S
RA MAJOR: 5.62 CM
RA PRESSURE: 3 MMHG
RA WIDTH: 4.2 CM
RIGHT VENTRICULAR END-DIASTOLIC DIMENSION: 3.69 CM
RV TISSUE DOPPLER FREE WALL SYSTOLIC VELOCITY 1 (APICAL 4 CHAMBER VIEW): 12.96 CM/S
SINUS: 3.6 CM
STJ: 2.77 CM
STRESS ST DEPRESSION: 1 MM
SYSTOLIC BLOOD PRESSURE: 127 MMHG
TDI LATERAL: 0.15 M/S
TDI SEPTAL: 0.13 M/S
TDI: 0.14 M/S
TR MAX PG: 22 MMHG
TRICUSPID ANNULAR PLANE SYSTOLIC EXCURSION: 2.21 CM
TV REST PULMONARY ARTERY PRESSURE: 25 MMHG

## 2022-06-22 PROCEDURE — 93351 STRESS TTE COMPLETE: CPT | Mod: 26,,, | Performed by: INTERNAL MEDICINE

## 2022-06-22 PROCEDURE — 93351 STRESS TTE COMPLETE: CPT

## 2022-06-22 PROCEDURE — 63600175 PHARM REV CODE 636 W HCPCS: Performed by: INTERNAL MEDICINE

## 2022-06-22 PROCEDURE — 63600150 PHARM REV CODE 636: Performed by: INTERNAL MEDICINE

## 2022-06-22 PROCEDURE — 93351 STRESS ECHO (CUPID ONLY): ICD-10-PCS | Mod: 26,,, | Performed by: INTERNAL MEDICINE

## 2022-06-22 RX ORDER — DOBUTAMINE HYDROCHLORIDE 100 MG/100ML
30 INJECTION INTRAVENOUS
Status: COMPLETED | OUTPATIENT
Start: 2022-06-22 | End: 2022-06-22

## 2022-06-22 RX ORDER — ATROPINE SULFATE 0.1 MG/ML
0.75 INJECTION INTRAVENOUS
Status: COMPLETED | OUTPATIENT
Start: 2022-06-22 | End: 2022-06-22

## 2022-06-22 RX ADMIN — DOBUTAMINE IN DEXTROSE 30 MCG/KG/MIN: 100 INJECTION, SOLUTION INTRAVENOUS at 09:06

## 2022-06-22 RX ADMIN — ATROPINE SULFATE 0.75 MG: 0.1 INJECTION PARENTERAL at 09:06

## 2022-06-22 NOTE — NURSING NOTE
"Patient verified by 2 identifiers and allergies reviewed.  22g IV placed to Lt AC.  DSE explained to patient, consent obtained & testing completed.  Pt tolerated testing well except for C/O "belching" during peak testing which resolved during the recovery period. IV removed post testing.  Post study discharge instructions reviewed with patient, patient verbalized understanding.  Patient discharged to home in stable condition.  "

## 2022-08-31 ENCOUNTER — OFFICE VISIT (OUTPATIENT)
Dept: DERMATOLOGY | Facility: CLINIC | Age: 52
End: 2022-08-31
Payer: COMMERCIAL

## 2022-08-31 DIAGNOSIS — L81.4 LENTIGINES: ICD-10-CM

## 2022-08-31 DIAGNOSIS — D22.9 MULTIPLE BENIGN NEVI: ICD-10-CM

## 2022-08-31 DIAGNOSIS — Z12.83 SKIN CANCER SCREENING: Primary | ICD-10-CM

## 2022-08-31 DIAGNOSIS — D18.01 CHERRY ANGIOMA: ICD-10-CM

## 2022-08-31 DIAGNOSIS — L82.1 SK (SEBORRHEIC KERATOSIS): ICD-10-CM

## 2022-08-31 DIAGNOSIS — L91.8 ST (SKIN TAG): ICD-10-CM

## 2022-08-31 PROCEDURE — 99213 PR OFFICE/OUTPT VISIT, EST, LEVL III, 20-29 MIN: ICD-10-PCS | Mod: S$GLB,,, | Performed by: DERMATOLOGY

## 2022-08-31 PROCEDURE — 1159F PR MEDICATION LIST DOCUMENTED IN MEDICAL RECORD: ICD-10-PCS | Mod: CPTII,S$GLB,, | Performed by: DERMATOLOGY

## 2022-08-31 PROCEDURE — 1160F RVW MEDS BY RX/DR IN RCRD: CPT | Mod: CPTII,S$GLB,, | Performed by: DERMATOLOGY

## 2022-08-31 PROCEDURE — 99999 PR PBB SHADOW E&M-EST. PATIENT-LVL III: ICD-10-PCS | Mod: PBBFAC,,, | Performed by: DERMATOLOGY

## 2022-08-31 PROCEDURE — 99999 PR PBB SHADOW E&M-EST. PATIENT-LVL III: CPT | Mod: PBBFAC,,, | Performed by: DERMATOLOGY

## 2022-08-31 PROCEDURE — 3044F PR MOST RECENT HEMOGLOBIN A1C LEVEL <7.0%: ICD-10-PCS | Mod: CPTII,S$GLB,, | Performed by: DERMATOLOGY

## 2022-08-31 PROCEDURE — 99213 OFFICE O/P EST LOW 20 MIN: CPT | Mod: S$GLB,,, | Performed by: DERMATOLOGY

## 2022-08-31 PROCEDURE — 1159F MED LIST DOCD IN RCRD: CPT | Mod: CPTII,S$GLB,, | Performed by: DERMATOLOGY

## 2022-08-31 PROCEDURE — 3044F HG A1C LEVEL LT 7.0%: CPT | Mod: CPTII,S$GLB,, | Performed by: DERMATOLOGY

## 2022-08-31 PROCEDURE — 1160F PR REVIEW ALL MEDS BY PRESCRIBER/CLIN PHARMACIST DOCUMENTED: ICD-10-PCS | Mod: CPTII,S$GLB,, | Performed by: DERMATOLOGY

## 2022-08-31 NOTE — PATIENT INSTRUCTIONS

## 2022-08-31 NOTE — PROGRESS NOTES
Subjective:       Patient ID:  Michael Lowe is a 51 y.o. male who presents for   Chief Complaint   Patient presents with    Skin Check     TBSE     HPI  Pt presents today for TBSE.   Denies any new, changing, or growing lesions on skin today.   States girlfriend has HPV and he would like to make sure he doesn't have anything concerning for rectal cancer. He's not aware of any warts in the groin area.    Review of Systems   Constitutional:  Negative for fever, chills, weight loss, weight gain, fatigue, night sweats and malaise.   Skin:  Positive for wears hat. Negative for daily sunscreen use, activity-related sunscreen use and recent sunburn (1 month ago-scalp).   Hematologic/Lymphatic: Bruises/bleeds easily.      Objective:    Physical Exam   Constitutional: He appears well-developed and well-nourished. No distress.   Genitourinary:         Neurological: He is alert and oriented to person, place, and time. He is not disoriented.   Psychiatric: He has a normal mood and affect.   Skin:   Areas Examined (abnormalities noted in diagram):   Scalp / Hair Palpated and Inspected  Head / Face Inspection Performed  Neck Inspection Performed  Chest / Axilla Inspection Performed  Abdomen Inspection Performed  Genitals / Buttocks / Groin Inspection Performed  Back Inspection Performed  RUE Inspected  LUE Inspection Performed  RLE Inspected  LLE Inspection Performed  Nails and Digits Inspection Performed                     Diagram Legend     Erythematous scaling macule/papule c/w actinic keratosis       Vascular papule c/w angioma      Pigmented verrucoid papule/plaque c/w seborrheic keratosis      Yellow umbilicated papule c/w sebaceous hyperplasia      Irregularly shaped tan macule c/w lentigo     1-2 mm smooth white papules consistent with Milia      Movable subcutaneous cyst with punctum c/w epidermal inclusion cyst      Subcutaneous movable cyst c/w pilar cyst      Firm pink to brown papule c/w dermatofibroma       Pedunculated fleshy papule(s) c/w skin tag(s)      Evenly pigmented macule c/w junctional nevus     Mildly variegated pigmented, slightly irregular-bordered macule c/w mildly atypical nevus      Flesh colored to evenly pigmented papule c/w intradermal nevus       Pink pearly papule/plaque c/w basal cell carcinoma      Erythematous hyperkeratotic cursted plaque c/w SCC      Surgical scar with no sign of skin cancer recurrence      Open and closed comedones      Inflammatory papules and pustules      Verrucoid papule consistent consistent with wart     Erythematous eczematous patches and plaques     Dystrophic onycholytic nail with subungual debris c/w onychomycosis     Umbilicated papule    Erythematous-base heme-crusted tan verrucoid plaque consistent with inflamed seborrheic keratosis     Erythematous Silvery Scaling Plaque c/w Psoriasis     See annotation      Assessment / Plan:        Skin cancer screening  Total body skin examination performed today including at least 12 points as noted in physical examination. No lesions suspicious for malignancy noted.  Patient instructed in importance of daily broad spectrum sunscreen use with spf at least 30. Sun avoidance and topical protection/protective clothing discussed.    Multiple benign nevi  Benign-appearing nevi present on exam today. Reassurance provided. Periodically examine moles and return to clinic if any moles change or become symptomatic (bleeding, itching, pain, etc).    SK (seborrheic keratosis)  These are benign inherited growths without a malignant potential. Reassurance given to patient. No treatment is necessary.   Treatment of benign, asymptomatic lesions may be considered cosmetic.  Warned about risk of hypo- or hyperpigmentation with treatment and risk of recurrence.    ST (skin tag)  Reassurance given to patient. No treatment is necessary.   Treatment of benign, asymptomatic lesions may be considered cosmetic.    Lentigines  These are benign sun  spots which should be monitored for changes. Patient instructed in importance of daily broad spectrum sunscreen use with spf at least 30. Sun avoidance and topical protection/protective clothing discussed.    Kinney angioma  This is a benign vascular lesion. Reassurance given. No treatment required. Treatment of benign, asymptomatic lesions may be considered cosmetic.    Offered to shave lesion on penis (SK vs verruca), but pt would rather just monitor and bx if changes/grows.    Follow up in about 1 year (around 8/31/2023) for skin check or sooner for any concerns.

## 2022-10-27 DIAGNOSIS — I25.10 CORONARY ARTERY DISEASE INVOLVING NATIVE CORONARY ARTERY OF NATIVE HEART WITHOUT ANGINA PECTORIS: ICD-10-CM

## 2022-10-27 DIAGNOSIS — E78.00 PURE HYPERCHOLESTEROLEMIA: ICD-10-CM

## 2022-10-27 RX ORDER — ATORVASTATIN CALCIUM 80 MG/1
TABLET, FILM COATED ORAL
Qty: 90 TABLET | Refills: 0 | Status: SHIPPED | OUTPATIENT
Start: 2022-10-27 | End: 2023-02-16 | Stop reason: SDUPTHER

## 2022-10-27 NOTE — TELEPHONE ENCOUNTER
Refill Authorization Note   Michael Lowe  is requesting a refill authorization.  Brief Assessment and Rationale for Refill:  Approve    -Medication-Related Problems Identified: Requires labs  Medication Therapy Plan:       Medication Reconciliation Completed: No   Comments:     Provider Staff:     Action is required for this patient.   Please see care gap opportunities below in Care Due Message.     Thanks!  [facility]     Appointments      Date Provider   Last Visit   6/16/2022 Basilio Salazar MD   Next Visit   Visit date not found Basilio Salazar MD     Note composed:1:51 PM 10/27/2022           Note composed:1:51 PM 10/27/2022

## 2022-10-27 NOTE — TELEPHONE ENCOUNTER
Care Due:                  Date            Visit Type   Department     Provider  --------------------------------------------------------------------------------                                EP -                              PRIMARY      [facility] INTERNAL  Last Visit: 06-      CARE ([facility])   MEDICINE       Basilio Salazar  Next Visit: None Scheduled  None         None Found                                                            Last  Test          Frequency    Reason                     Performed    Due Date  --------------------------------------------------------------------------------    CMP.........  12 months..  atorvastatin.............  12- 12-    Lipid Panel.  12 months..  atorvastatin.............  12- 12-    [employer] Embedded Care Gaps. Reference number: [id number]. 10/27/2022   1:47:27 PM CDT

## 2023-03-20 ENCOUNTER — OFFICE VISIT (OUTPATIENT)
Dept: INTERNAL MEDICINE | Facility: CLINIC | Age: 53
End: 2023-03-20
Attending: INTERNAL MEDICINE
Payer: COMMERCIAL

## 2023-03-20 VITALS
WEIGHT: 254.88 LBS | HEART RATE: 87 BPM | SYSTOLIC BLOOD PRESSURE: 138 MMHG | DIASTOLIC BLOOD PRESSURE: 75 MMHG | BODY MASS INDEX: 31.85 KG/M2 | OXYGEN SATURATION: 99 %

## 2023-03-20 DIAGNOSIS — Z12.5 PROSTATE CANCER SCREENING: ICD-10-CM

## 2023-03-20 DIAGNOSIS — D64.9 ANEMIA, UNSPECIFIED TYPE: ICD-10-CM

## 2023-03-20 DIAGNOSIS — Z00.00 ANNUAL PHYSICAL EXAM: Primary | ICD-10-CM

## 2023-03-20 DIAGNOSIS — Z87.891 FORMER SMOKER, STOPPED SMOKING MANY YEARS AGO: ICD-10-CM

## 2023-03-20 DIAGNOSIS — R73.03 PRE-DIABETES: ICD-10-CM

## 2023-03-20 DIAGNOSIS — I25.10 CORONARY ARTERY DISEASE INVOLVING NATIVE CORONARY ARTERY OF NATIVE HEART WITHOUT ANGINA PECTORIS: ICD-10-CM

## 2023-03-20 DIAGNOSIS — R04.2 HEMOPTYSIS: ICD-10-CM

## 2023-03-20 PROCEDURE — 99999 PR PBB SHADOW E&M-EST. PATIENT-LVL IV: ICD-10-PCS | Mod: PBBFAC,,, | Performed by: INTERNAL MEDICINE

## 2023-03-20 PROCEDURE — 99999 PR PBB SHADOW E&M-EST. PATIENT-LVL IV: CPT | Mod: PBBFAC,,, | Performed by: INTERNAL MEDICINE

## 2023-03-20 PROCEDURE — 99396 PR PREVENTIVE VISIT,EST,40-64: ICD-10-PCS | Mod: S$GLB,,, | Performed by: INTERNAL MEDICINE

## 2023-03-20 PROCEDURE — 99396 PREV VISIT EST AGE 40-64: CPT | Mod: S$GLB,,, | Performed by: INTERNAL MEDICINE

## 2023-03-20 NOTE — PROGRESS NOTES
Subjective:       Patient ID: Michael Lowe is a 52 y.o. male.    Chief Complaint: Annual Exam    Here for annual exam    Isolated episode of coughing up sputum with blood. Former smoker. Quit 18 years prior. Pt denies SOB at rest or BARNEY, PND, chest tightness, wheezing, night sweats, or unexpected weight loss. Occasional NSAIDS or COXi for aches/pains.      ### cervical DJD ###  C5-C6 herniated disc. Ulnar and medial nerve entrapment s/p scar tissues removal from elbow and carpal tunnel release. He has not regained full fine motor control but symptoms are manageable. No significant pain issues.       ### CAD non obstructive  ###  4/18/18 Calcium Ct score- Agatston score 101-400: Moderate plaque burden. High cardiovascular disease risk.  He is tolerating atorvastatin 40 mg.          LDLCALC                  86.4                12/16/2021 09:50 AM        LDLCALC                  91.0                10/08/2020 08:39 AM        LDLCALC                  97.8                01/24/2020 09:35 AM        LDLCALC                  80.0                05/23/2019 09:40 AM         Review of Systems   Constitutional:  Negative for appetite change, chills, fever and unexpected weight change.   HENT:  Negative for hearing loss, sore throat and trouble swallowing.    Eyes:  Negative for visual disturbance.   Respiratory:  Negative for cough, chest tightness and shortness of breath.    Cardiovascular:  Negative for chest pain and leg swelling.   Gastrointestinal:  Negative for abdominal pain, blood in stool, constipation, diarrhea, nausea and vomiting.   Endocrine: Negative for polydipsia and polyuria.   Genitourinary:  Negative for decreased urine volume, difficulty urinating, dysuria, frequency and urgency.   Musculoskeletal:  Negative for gait problem.   Skin:  Negative for rash.   Neurological:  Negative for dizziness and numbness.   Psychiatric/Behavioral:  The patient is not nervous/anxious.      Objective:      Vitals:     03/20/23 1306   BP: 138/75   BP Location: Left arm   Patient Position: Sitting   Pulse: 87   SpO2: 99%   Weight: 115.6 kg (254 lb 13.6 oz)      Physical Exam  Vitals and nursing note reviewed.   Constitutional:       General: He is not in acute distress.     Appearance: Normal appearance. He is well-developed.   HENT:      Head: Normocephalic and atraumatic.      Mouth/Throat:      Pharynx: No oropharyngeal exudate.   Eyes:      General: No scleral icterus.     Conjunctiva/sclera: Conjunctivae normal.      Pupils: Pupils are equal, round, and reactive to light.   Neck:      Thyroid: No thyromegaly.   Cardiovascular:      Rate and Rhythm: Normal rate and regular rhythm.      Heart sounds: Normal heart sounds. No murmur heard.  Pulmonary:      Effort: Pulmonary effort is normal.      Breath sounds: Normal breath sounds. No wheezing or rales.   Abdominal:      General: There is no distension.   Musculoskeletal:         General: No tenderness.   Lymphadenopathy:      Cervical: No cervical adenopathy.   Skin:     General: Skin is warm and dry.   Neurological:      Mental Status: He is alert and oriented to person, place, and time.   Psychiatric:         Behavior: Behavior normal.       Assessment:       1. Annual physical exam    2. Former smoker, stopped smoking many years ago    3. Pre-diabetes    4. Anemia, unspecified type    5. Prostate cancer screening    6. Coronary artery disease involving native coronary artery of native heart without angina pectoris    7. Hemoptysis          Plan:       Michael was seen today for annual exam.    Diagnoses and all orders for this visit:    Annual physical exam  -     Comprehensive Metabolic Panel; Future  -     Lipid Panel; Future  -     TSH; Future  -     CBC Auto Differential; Future  -     Hemoglobin A1C; Future    Former smoker, stopped smoking many years ago    Pre-diabetes   Update A1c    Anemia, unspecified type    Prostate cancer screening  -     PSA, Screening;  Future    Coronary artery disease involving native coronary artery of native heart without angina pectoris   Needs better BP control. Discussed need for medication at this level   Denies frequent or current HA, intermittent blurry vision, dizziness, CP, or SOB.    Hemoptysis  -     CT Chest Without Contrast; Future    RTC in 6 months or sooner jayla Blum MD  Internal Medicine-[facility]        Side effects of medication(s) were discussed in detail and patient voiced understanding.  Patient will call back for any issues or complications.

## 2023-03-21 ENCOUNTER — HOSPITAL ENCOUNTER (OUTPATIENT)
Dept: RADIOLOGY | Facility: OTHER | Age: 53
Discharge: HOME | End: 2023-03-21
Attending: INTERNAL MEDICINE
Payer: COMMERCIAL

## 2023-03-21 DIAGNOSIS — R04.2 HEMOPTYSIS: ICD-10-CM

## 2023-03-21 PROCEDURE — 71250 CT THORAX DX C-: CPT | Mod: 26,,, | Performed by: RADIOLOGY

## 2023-03-21 PROCEDURE — 71250 CT THORAX DX C-: CPT | Mod: TC

## 2023-03-21 PROCEDURE — 71250 CT CHEST WITHOUT CONTRAST: ICD-10-PCS | Mod: 26,,, | Performed by: RADIOLOGY

## 2023-03-27 ENCOUNTER — PATIENT MESSAGE (OUTPATIENT)
Dept: INTERNAL MEDICINE | Facility: CLINIC | Age: 53
End: 2023-03-27
Payer: COMMERCIAL

## 2023-03-27 DIAGNOSIS — Z30.09 VASECTOMY EVALUATION: ICD-10-CM

## 2023-03-27 DIAGNOSIS — Z30.2 ENCOUNTER FOR VASECTOMY: Primary | ICD-10-CM

## 2023-03-31 ENCOUNTER — OFFICE VISIT (OUTPATIENT)
Dept: UROLOGY | Facility: CLINIC | Age: 53
End: 2023-03-31
Attending: INTERNAL MEDICINE
Payer: COMMERCIAL

## 2023-03-31 VITALS
DIASTOLIC BLOOD PRESSURE: 90 MMHG | OXYGEN SATURATION: 98 % | BODY MASS INDEX: 31.08 KG/M2 | HEIGHT: 75 IN | RESPIRATION RATE: 16 BRPM | SYSTOLIC BLOOD PRESSURE: 142 MMHG | HEART RATE: 68 BPM | WEIGHT: 250 LBS

## 2023-03-31 DIAGNOSIS — Z30.09 VASECTOMY EVALUATION: ICD-10-CM

## 2023-03-31 PROCEDURE — 99244 OFF/OP CNSLTJ NEW/EST MOD 40: CPT | Mod: S$GLB,,, | Performed by: UROLOGY

## 2023-03-31 PROCEDURE — 99999 PR PBB SHADOW E&M-EST. PATIENT-LVL IV: ICD-10-PCS | Mod: PBBFAC,,, | Performed by: UROLOGY

## 2023-03-31 PROCEDURE — 99244 PR OFFICE CONSULTATION,LEVEL IV: ICD-10-PCS | Mod: S$GLB,,, | Performed by: UROLOGY

## 2023-03-31 PROCEDURE — 99999 PR PBB SHADOW E&M-EST. PATIENT-LVL IV: CPT | Mod: PBBFAC,,, | Performed by: UROLOGY

## 2023-03-31 RX ORDER — DIAZEPAM 5 MG/1
5 TABLET ORAL ONCE
Qty: 1 TABLET | Refills: 0 | Status: SHIPPED | OUTPATIENT
Start: 2023-03-31 | End: 2024-01-11

## 2023-03-31 NOTE — PROGRESS NOTES
"Subjective:      Mr. Lowe is a 52 y.o.  male referred by Basilio Salazar MD for evaluation regarding vasectomy. He has no children and he is certain that he desires no more. He is aware of other forms of contraception. He is aware that vasectomy is to be considered permanent/irreversible.    He denies orchalgia and history of testicular surgery or infection.    The following portions of the patient's history were reviewed and updated as appropriate: allergies, current medications, past family history, past medical history, past social history, past surgical history and problem list.    Review of Systems  Constitutional: no fever or chills  ENT: no nasal congestion or sore throat  Respiratory: no cough or shortness of breath  Cardiovascular: no chest pain or palpitations  Gastrointestinal: no nausea or vomiting, tolerating diet  Genitourinary: as per HPI  Hematologic/Lymphatic: no easy bruising or lymphadenopathy  Musculoskeletal: no arthralgias or myalgias  Neurological: no seizures or tremors  Behavioral/Psych: no auditory or visual hallucinations     Objective:   Vitals: BP (!) 142/90 (BP Location: Right arm, Patient Position: Sitting, BP Method: Large (Automatic))   Pulse 68   Resp 16   Ht 6' 3" (1.905 m)   Wt 113.4 kg (250 lb)   SpO2 98%   BMI 31.25 kg/m²     Physical Exam   General: alert and oriented, no acute distress  Head: normocephalic, atraumatic  Neck: supple, no lymphadenopathy, normal ROM, no masses  Respiratory: Symmetric expansion, non-labored breathing  Cardiovascular: regular rate and rhythm, nomal pulses, no peripheral edema  Abdomen: soft, non tender, non distended, no palpable masses, no hernias, no hepatomegaly or splenomegaly  Genitourinary:   Penis: normal, no lesions, patent orthotopic meatus, no plaques  Scrotum: no rashes or skin changes;   Testes: descended bilaterally, no masses, nontender, normal epididymides bilaterally, no hydroceles  Lymphatic: no inguinal " nodes  Skin: normal coloration and turgor, no rashes, no suspicious skin lesions noted  Neuro: alert and oriented x3, no gross deficits  Psych: normal judgment and insight, normal mood/affect, and non-anxious    Assessment:   Michael Lowe is a 52 y.o. male who presents for evaluation regarding vasectomy.    Plan:   I discussed with the patient risks and benefits, as well as alternatives. We discussed possible complications at length, including fever, infection, bleeding--possibly requiring reoperation,testicular injury or atrophy with loss of function, chronic pain, persistent or recurrent presence of sperm in the ejaculate, vasal recanalization, as well as the risks attendant to the anesthetic.  We discussed that he should stop aspirin, ibuprofen, and similar products at least one week prior to the procedure. Acetominophen is okay to use for headaches, pain, etc.  I recommended that he bring an athletic supporter on the day of the procedure.  We discussed that he must continue to use contraception for 3 months and until semen analyses reveals azoospermia.  He will schedule an elective vasectomy.  Prescription provided for valium with instructions to take upon arrival at office the day of the procedure.  Consent signed and scanned into chart.

## 2023-04-03 ENCOUNTER — PROCEDURE VISIT (OUTPATIENT)
Dept: UROLOGY | Facility: CLINIC | Age: 53
End: 2023-04-03
Attending: UROLOGY
Payer: COMMERCIAL

## 2023-04-03 VITALS — DIASTOLIC BLOOD PRESSURE: 76 MMHG | HEART RATE: 76 BPM | SYSTOLIC BLOOD PRESSURE: 145 MMHG

## 2023-04-03 DIAGNOSIS — Z30.2 ADMISSION FOR VASECTOMY: Primary | ICD-10-CM

## 2023-04-03 DIAGNOSIS — Z30.09 VASECTOMY EVALUATION: ICD-10-CM

## 2023-04-03 PROCEDURE — 55250 VASECTOMY: ICD-10-PCS | Mod: S$GLB,,, | Performed by: UROLOGY

## 2023-04-03 PROCEDURE — 55250 REMOVAL OF SPERM DUCT(S): CPT | Mod: S$GLB,,, | Performed by: UROLOGY

## 2023-04-03 RX ORDER — LIDOCAINE HYDROCHLORIDE AND EPINEPHRINE 10; 10 MG/ML; UG/ML
1 INJECTION, SOLUTION INFILTRATION; PERINEURAL
Status: SHIPPED | OUTPATIENT
Start: 2023-04-03

## 2023-04-03 NOTE — PROCEDURES
"Vasectomy    Date/Time: 4/3/2023 2:30 PM  Performed by: Grzegorz Pelaez MD  Authorized by: Grzegorz Pelaez MD     Consent Done?:  Yes (Written)  Time out: Immediately prior to procedure a "time out" was called to verify the correct patient, procedure, equipment, support staff and site/side marked as required.    Indications:  [place] male  Position:  Dorsal lithotomy  Anesthesia:  Other (5cc 1% lidocaine)  Patient sedated: No    Preparation: Patient was prepped and draped in usual sterile fashion    Incisions:  1  Length vas excised:  2.5 cm  Vas:  Fulgurated and Buried  Sutures:  3-0 chromic SH (for fascial interposition)  Skin closures:  3-0 chromic SH  Same procedure performed on both sides    Patient tolerance:  Patient tolerated the procedure well with no immediate complications     Post-Procedure Care:  -- No heavy lifting for 5-7 days  -- No intercourse for 3-4 days  -- OK to shower tomorrow; no soaking in water for 7 days  -- Call for severe pain, bleeding, concern for infection    FU in 3 months for semen analysis.  "

## 2023-04-21 DIAGNOSIS — M25.562 CHRONIC PAIN OF LEFT KNEE: Primary | ICD-10-CM

## 2023-04-21 DIAGNOSIS — G89.29 CHRONIC PAIN OF LEFT KNEE: Primary | ICD-10-CM

## 2023-04-24 ENCOUNTER — APPOINTMENT (OUTPATIENT)
Dept: RADIOLOGY | Facility: OTHER | Age: 53
End: 2023-04-24
Attending: PHYSICIAN ASSISTANT
Payer: COMMERCIAL

## 2023-04-24 ENCOUNTER — OFFICE VISIT (OUTPATIENT)
Dept: SPORTS MEDICINE | Facility: CLINIC | Age: 53
End: 2023-04-24
Payer: COMMERCIAL

## 2023-04-24 VITALS
DIASTOLIC BLOOD PRESSURE: 82 MMHG | WEIGHT: 250 LBS | HEIGHT: 75 IN | SYSTOLIC BLOOD PRESSURE: 131 MMHG | BODY MASS INDEX: 31.08 KG/M2

## 2023-04-24 DIAGNOSIS — M25.562 CHRONIC PAIN OF LEFT KNEE: ICD-10-CM

## 2023-04-24 DIAGNOSIS — G89.29 CHRONIC PAIN OF LEFT KNEE: ICD-10-CM

## 2023-04-24 DIAGNOSIS — M17.12 PRIMARY OSTEOARTHRITIS OF LEFT KNEE: Primary | ICD-10-CM

## 2023-04-24 PROCEDURE — 99215 OFFICE O/P EST HI 40 MIN: CPT | Mod: 25,S$GLB,, | Performed by: PHYSICIAN ASSISTANT

## 2023-04-24 PROCEDURE — 73562 X-RAY EXAM OF KNEE 3: CPT | Mod: 26,RT,, | Performed by: RADIOLOGY

## 2023-04-24 PROCEDURE — 20611 LARGE JOINT ASPIRATION/INJECTION: L KNEE: ICD-10-PCS | Mod: LT,S$GLB,, | Performed by: PHYSICIAN ASSISTANT

## 2023-04-24 PROCEDURE — 73564 X-RAY EXAM KNEE 4 OR MORE: CPT | Mod: TC,LT

## 2023-04-24 PROCEDURE — 73564 XR KNEE ORTHO LEFT WITH FLEXION: ICD-10-PCS | Mod: 26,LT,, | Performed by: RADIOLOGY

## 2023-04-24 PROCEDURE — 73564 X-RAY EXAM KNEE 4 OR MORE: CPT | Mod: 26,LT,, | Performed by: RADIOLOGY

## 2023-04-24 PROCEDURE — 20611 DRAIN/INJ JOINT/BURSA W/US: CPT | Mod: LT,S$GLB,, | Performed by: PHYSICIAN ASSISTANT

## 2023-04-24 PROCEDURE — 99215 PR OFFICE/OUTPT VISIT, EST, LEVL V, 40-54 MIN: ICD-10-PCS | Mod: 25,S$GLB,, | Performed by: PHYSICIAN ASSISTANT

## 2023-04-24 PROCEDURE — 99999 PR PBB SHADOW E&M-EST. PATIENT-LVL IV: CPT | Mod: PBBFAC,,, | Performed by: PHYSICIAN ASSISTANT

## 2023-04-24 PROCEDURE — 99999 PR PBB SHADOW E&M-EST. PATIENT-LVL IV: ICD-10-PCS | Mod: PBBFAC,,, | Performed by: PHYSICIAN ASSISTANT

## 2023-04-24 PROCEDURE — 73562 XR KNEE ORTHO LEFT WITH FLEXION: ICD-10-PCS | Mod: 26,RT,, | Performed by: RADIOLOGY

## 2023-04-24 RX ORDER — BUPIVACAINE HYDROCHLORIDE 2.5 MG/ML
2 INJECTION, SOLUTION INFILTRATION; PERINEURAL
Status: DISCONTINUED | OUTPATIENT
Start: 2023-04-24 | End: 2023-04-24 | Stop reason: HOSPADM

## 2023-04-24 RX ORDER — TRIAMCINOLONE ACETONIDE 40 MG/ML
40 INJECTION, SUSPENSION INTRA-ARTICULAR; INTRAMUSCULAR
Status: DISCONTINUED | OUTPATIENT
Start: 2023-04-24 | End: 2023-04-24 | Stop reason: HOSPADM

## 2023-04-24 RX ADMIN — BUPIVACAINE HYDROCHLORIDE 2 ML: 2.5 INJECTION, SOLUTION INFILTRATION; PERINEURAL at 03:04

## 2023-04-24 RX ADMIN — TRIAMCINOLONE ACETONIDE 40 MG: 40 INJECTION, SUSPENSION INTRA-ARTICULAR; INTRAMUSCULAR at 03:04

## 2023-04-24 NOTE — PROGRESS NOTES
NEW PATIENT    HISTORY OF PRESENT ILLNESS   52 y.o. Male with a history of left knee pain who has previously seen Dr. Mann for his left knee a year and a half ago.  He complains of having increased medial-sided knee pain, swelling, and tightness.  Occasionally has a catching sensation but denies having any instability.  He has had no previous injury or significant treatment other than over-the-counter NSAIDs and activity modification.  He has been unable to run or jog but can continue to ride his bike without significant discomfort or difficulty.      + mechanical symptoms, - instability    Is affecting ADLs.  Pain is 8/10 at it's worst.        PAST MEDICAL HISTORY    Past Medical History:   Diagnosis Date    Carpal tunnel syndrome of right wrist     Cervical disc herniation     Hyperlipidemia     Low back pain        PAST SURGICAL HISTORY     Past Surgical History:   Procedure Laterality Date    CARPAL TUNNEL RELEASE Right 1/4/2019    Procedure: RELEASE, CARPAL TUNNEL right revision;  Surgeon: Mackenzie Harden MD;  Location: [facility];  Service: Orthopedics;  Laterality: Right;  Anesthesia: General and Regional. Stretcher, supine, hand pan 1 and pan 2, CLARIX, AXOGEN    CARPAL TUNNEL RELEASE Left 3/4/2020    Procedure: RELEASE, CARPAL TUNNEL;  Surgeon: Mackenzie Harden MD;  Location: [facility] OR;  Service: Orthopedics;  Laterality: Left;    RELEASE OF ULNAR NERVE AT CUBITAL TUNNEL Right 1/4/2019    Procedure: RELEASE, ULNAR TUNNEL right elbow (submuscular transposition);  Surgeon: Mackenzie Harden MD;  Location: [facility];  Service: Orthopedics;  Laterality: Right;  Anesthesia: General and Regional. Stretcher, supine, hand pan 1 and pan 2, CLARIX, AXOGEN    right carpal tunnel release  10-23-13       FAMILY HISTORY    Family History   Problem Relation Age of Onset    Diabetes Mother     Pacemaker/defibrilator Mother     Hypertension Mother     Cancer Neg Hx     Heart disease Neg Hx     Colon polyps Neg  Hx     Hyperlipidemia Neg Hx        SOCIAL HISTORY    Social History     Socioeconomic History    Marital status: Single   Occupational History    Occupation: [employer]     Employer: SELF   Tobacco Use    Smoking status: Former     Types: Cigarettes     Quit date: 2005     Years since quittin.6    Smokeless tobacco: Never   Substance and Sexual Activity    Alcohol use: Yes     Alcohol/week: 20.0 standard drinks     Types: 8 Glasses of wine, 12 Cans of beer per week     Comment: socially    Drug use: No    Sexual activity: Yes     Partners: Female     Comment: partner has iud       MEDICATIONS      Current Outpatient Medications:     aspirin 81 MG Chew, Take 1 tablet (81 mg total) by mouth once daily., Disp: , Rfl:     atorvastatin (LIPITOR) 80 MG tablet, Take 1 tablet (80 mg total) by mouth once daily., Disp: 90 tablet, Rfl: 1    lysine 500 mg Tab, Take 1 tablet by mouth once daily. , Disp: , Rfl:     milk thistle 175 mg tablet, Take 175 mg by mouth once daily., Disp: , Rfl:     C/sourcherry/celery/grape seed (TART CHERRY ORAL), Take by mouth., Disp: , Rfl:     diazePAM (VALIUM) 5 MG tablet, Take 1 tablet (5 mg total) by mouth once. Take 30 minutes before procedure. for 1 dose, Disp: 1 tablet, Rfl: 0    meloxicam (MOBIC) 15 MG tablet, Take 1 tablet (15 mg total) by mouth once daily. (Patient not taking: Reported on 2023), Disp: 30 tablet, Rfl: 0    omega-3 fatty acids-vitamin E 1,000 mg Cap, Take 1 capsule by mouth once daily., Disp: , Rfl:     Current Facility-Administered Medications:     LIDOcaine-EPINEPHrine 1%-1:100,000 injection 1 mL, 1 mL, Other, 1 time in Clinic/HOD, Grzegorz Pelaez MD    Facility-Administered Medications Ordered in Other Visits:     mupirocin 2 % ointment, , Nasal, On Call Procedure, Brian Rider MD, Given at 19 0921    ALLERGIES     Review of patient's allergies indicates:   Allergen Reactions    No known drug allergies          REVIEW OF SYSTEMS  "  Constitution: Negative. Negative for chills, fever and night sweats.   HENT: Negative for congestion and headaches.    Eyes: Negative for blurred vision, left vision loss and right vision loss.   Cardiovascular: Negative for chest pain and syncope.   Respiratory: Negative for cough and shortness of breath.    Endocrine: Negative for polydipsia, polyphagia and polyuria.   Hematologic/Lymphatic: Negative for bleeding problem. Does not bruise/bleed easily.   Skin: Negative for dry skin, itching and rash.   Musculoskeletal: Negative for falls. Positive for left knee pain.  Gastrointestinal: Negative for abdominal pain and bowel incontinence.   Genitourinary: Negative for bladder incontinence and nocturia.   Neurological: Negative for disturbances in coordination, loss of balance and seizures.   Psychiatric/Behavioral: Negative for depression. The patient does not have insomnia.    Allergic/Immunologic: Negative for hives and persistent infections.     PHYSICAL EXAMINATION    Vitals: /82   Ht 6' 3" (1.905 m)   Wt 113.4 kg (250 lb)   BMI 31.25 kg/m²     General: The patient appears active and healthy with no apparent physical problems.  No disturbance of mood or affect is demonstrated. Alert and Oriented.    HEENT: Eyes normal, pupils equally round, nose normal.    Resp: Equal and symmetrical chest rises. No wheezing    CV: Regular rate    Neck: Supple; nonpainful range of motion.    Extremities: no cyanosis, clubbing, edema, or diffuse swelling.  Palpable pulses, good capillary refill of the digits.  No coolness, discoloration, edema or obvious varicosities.    Skin: no lesions noted.    Lymphatic: no detected adenopathy in the upper or lower extremities.    Neurologic: normal mental status, normal reflexes, normal gait and balance.  Patient is alert and oriented to person, place and time.  No flaccidity or spasticity is noted.  No motor or sensory deficits are noted.  Light touch is intact    Orthopaedic: " Knee Musculoskeletal Exam  Gait    Antalgic: left    Inspection    Left      Erythema: none        Effusion: mild        Edema: none        Ecchymosis: none        Deformity: none        Alignment: normal        Previous incision: no previous incision    Palpation    Left      Increased warmth: none        Masses: none        Crepitus: patellofemoral        Tenderness: present          Medial joint line: moderate          Medial retinaculum: mild      Range of Motion    Left      Active extension: 0      Passive extension: 0      Active flexion: 130      Passive flexion: 130    Strength    Left      Left knee strength is normal.       Extension: 5/5. Extension is not affected by pain.       Flexion: 5/5. Flexion is not affected by pain.      Instability    Left      Varus stress grade: normal      Valgus stress grade: normal      Anterior drawer: normal      Posterior drawer: normal      Medial Erika test: positive      Lateral Erika test: negative      Lachman: negative    Neurovascular    Left      Left knee neurovascular exam is normal.        Pulses - DP: normal      Pulses - PT: normal    Special Signs    Left      Left knee special signs are normal.      Straight leg raise: normal      J sign: none        Patellar compression: none        Patellar apprehension: none        IMAGING    X-ray Knee Ortho Left with Flexion  Narrative: EXAMINATION:  XR KNEE ORTHO LEFT WITH FLEXION    CLINICAL HISTORY:  . Pain in left knee    TECHNIQUE:  AP standing view of both knees, PA flexion standing views of both knees, and Merchant views of both knees were performed. A lateral view of the left knee was also performed.    COMPARISON:  None    FINDINGS:  No acute fracture or dislocation seen.  Mild osteophyte formation with mild narrowing medial tibiofemoral compartment.    There may be a small suprapatellar joint effusion.  No significant soft tissue edema.  Impression: Please see above.    Electronically signed  by: Rachele Davalos  Date:    04/24/2023  Time:    15:06    I have personally reviewed today's x-ray images with the patient and compared them to his previous films done in December of 2021.  There has been progressive loss of medial joint space with an approximate 50% reduction in joint space compared to the contralateral side.  There is mild periarticular osteophyte formation in the medial and patellofemoral compartments.  The lateral joint space appears to be well preserved.  Kellgren Kwaku grade 3 changes.    IMPRESSION       ICD-10-CM ICD-9-CM   1. Primary osteoarthritis of left knee  M17.12 715.16       MEDICATIONS PRESCRIBED      None    RECOMMENDATIONS     Surgical and nonsurgical treatment options for early medial and patellofemoral compartment osteoarthritis with suspected degenerative tear of the medial meniscus were discussed  Left knee intra-articular corticosteroid injection was administered under ultrasound guidance  Physical therapy for left knee strengthening and stabilization  Activity modification, ice and elevation, and oral NSAIDs as needed for pain and swelling  Return to clinic in 1 month for repeat evaluation; we will order an MRI and consider HA injections if symptoms persist    Large Joint Aspiration/Injection: L knee    Date/Time: 4/24/2023 3:30 PM  Performed by: Mahesh Huizar PA-C  Authorized by: Mahesh Huizar PA-C     Consent Done?:  Yes (Verbal)  Indications:  Pain and arthritis  Site marked: the procedure site was marked    Timeout: prior to procedure the correct patient, procedure, and site was verified    Prep: patient was prepped and draped in usual sterile fashion      Local anesthesia used?: Yes    Local anesthetic:  Co-phenylcaine spray    Details:  Needle Size:  22 G  Ultrasonic Guidance for needle placement?: Yes (Ultrasound guidance was utilized for needle localization.  Dynamic visualization of the needle was continuous throughout the procedure and maintained accurate  placement.  Images were saved and stored for documentation.)    Images are saved and documented.  Approach: superolateral.  Location:  Knee  Site:  L knee  Medications:  40 mg triamcinolone acetonide 40 mg/mL; 2 mL BUPivacaine 0.25% (2.5 mg/ml) 0.25 % (2.5 mg/mL)  Patient tolerance:  Patient tolerated the procedure well with no immediate complications      All questions were answered, pt will contact us for questions or concerns in the interim.

## 2023-04-25 ENCOUNTER — CLINICAL SUPPORT (OUTPATIENT)
Dept: REHABILITATION | Facility: OTHER | Age: 53
End: 2023-04-25
Payer: COMMERCIAL

## 2023-04-25 DIAGNOSIS — M17.12 PRIMARY OSTEOARTHRITIS OF LEFT KNEE: ICD-10-CM

## 2023-04-25 PROCEDURE — 97110 THERAPEUTIC EXERCISES: CPT | Mod: PN

## 2023-04-25 PROCEDURE — 97161 PT EVAL LOW COMPLEX 20 MIN: CPT | Mod: PN

## 2023-04-25 NOTE — PROGRESS NOTES
[facility]  Physical Therapy Initial Evaluation    Name: Michael Lowe  Clinic Number: [id number]    Therapy Diagnosis:   Encounter Diagnosis   Name Primary?    Primary osteoarthritis of left knee      Physician: Mahesh Huizar PA-C    Physician Orders: Physical Therapy Evaluate and Treat  Medical Diagnosis from Referral: OA left knee  Evaluation Date: 4/25/23  Authorization Period Expiration: 6/25/23  Plan of Care Expiration: 4/25/2023 to 7/25/23  Visit # / Visits authorized: 1/1 (pending additional authorization following initial evaluation)   FOTO: 1/3  on 4/25/23      Time In: 400p  Time Out: 450p  Total Billable Time: 50 minutes    Precautions: standard    Subjective     History of current condition - Michael reports: chronic left knee pain    Likes to ride bike - swim    Builds outdoor furniture for walk    Pain around the clock before yesterday. Steroid shot yesterday helped     Medical History:   Past Medical History:   Diagnosis Date    Carpal tunnel syndrome of right wrist     Cervical disc herniation     Hyperlipidemia     Low back pain        Surgical History:   Michael Lowe  has a past surgical history that includes right carpal tunnel release (10-23-13); Carpal tunnel release (Right, 1/4/2019); Release of ulnar nerve at cubital tunnel (Right, 1/4/2019); and Carpal tunnel release (Left, 3/4/2020).    Medications:   Michael has a current medication list which includes the following prescription(s): aspirin, atorvastatin, c/sourcherry/celery/grape seed, diazepam, lysine, meloxicam, milk thistle, and omega-3 fatty acids-vitamin e, and the following Facility-Administered Medications: lidocaine-epinephrine 1%-1:100,000 and mupirocin.    Allergies:   Review of patient's allergies indicates:   Allergen Reactions    No known drug allergies         Imaging: No acute fracture or dislocation seen.  Mild osteophyte formation with mild narrowing medial tibiofemoral  compartment.    Prior Therapy: no  Social History: 53 yo male - lives with SO  Occupation: makes furniture - moving heavy funiture  Prior Level of Function: unlimited activity  Current Level of Function: limited with recreational activity    Pain:  Current 3/10, worst 7/10, best 3/10   Location: left knee   Description: Aching  Aggravating Factors: Standing and Walking  Easing Factors: exercise    Pts goals: Pt would like to return to recreational activity and work tasks with no increase in left knee pain.    Objective     WNL=within normal limits  WFL=within functional limits  NT=not tested  *=pain    Posture: WNL  Palpation: tenderness to palpation   Sensation: intace  Deep tendon reflexes: NT      Range of Motion:   Knee Left active Left Passive Right Active R passive   Flexion 120 125** 132 135   Extension 0 0 0 0           Lower Extremity Strength  Right LE   Left LE      Iliopsoas:  L2 4/5 Iliopsoas: L2 5/5    Quadriceps:  L3 - femoral nerve 5/5 Quadriceps: L3 - femoral nerve 5/5    Hip adduction:  L3 - obterator 5/5 Hip adduction: L3 - obterator 5/5    Hamstrings:  S2 5/5 Hamstrings: S2 5/5    Ankle DF/EV:  L4 5/5 Ankle DF/EV: L4 4/5    GT Ext:  L5 NT GT Ext L5 NT    Hip Abduction:  L5-S1 - Superior gluteal nerve 5/5 Hip Abduction: L5-S1 - Superior gluteal nerve 5/5    Hip extension:  L5-S2 - Inferior gluteal nerve 5/5 Hip extension: L5-S2 - Inferior gluteal nerve 5/5    Ankle PF:   S1 - tibial nerve 5/5 Ankle PF S1 - tibial nerve 4/5       Gabby's test: positive on left    Positive femoral nerve tension test on L    Posture:     Active Range of Motion:   Ankle Right Left   DF  15 5   Plantarflexion NT NT   Inversion NT NT   Eversion NT NT            CMS Impairment/Limitation/Restriction for FOTO Survey    Therapist reviewed FOTO scores for Michael Kerwin Lowe on 4/25/2023.   FOTO documents entered into [employer] - see Media section.    Limitation Score: 48%  Predicted Goal: 34%    Category: Mobility     TREATMENT  "    Treatment Time In: 400p  Treatment Time Out: 500p  Total Treatment time separate from Evaluation: 15 minutes    Therapeutic Exercises were provided for 15 minutes to improve strength and AROM including:    Standing gastroc stretch 2x30"  Supine hamstring stretch with strap forward/sides 30" each  Prone femoral nerve glide x10  Prone quadriceps stretch 2x30"    Home Exercises and Patient Education Provided:    Education provided:   - Findings; prognosis and plan of care (POC)  - Home exercise program (HEP)  - Modality options  - Therapist contact information    Written Home Exercises Provided: Yes  Exercises were reviewed and Michael was able to demonstrate them prior to the end of the session.  Michael demonstrated good understanding of the education provided.       Assessment     Michael is a 52 y.o. male referred to outpatient Physical Therapy with a medical diagnosis of left knee OA. Pt presents to PT with pain, decreased left knee ROM, decreased strength and flexibility, poor posture, and functional deficits with work related tasks. These deficits are negatively impacting this patient's ability to complete their work duties and activities of daily living.     Pt prognosis is Good.   Pt will benefit from skilled outpatient Physical Therapy to address the deficits stated above and in the chart below, provide pt/family education, and to maximize pt's level of independence.     Plan of care discussed with patient: Yes  Pt's spiritual, cultural and educational needs considered and pt agreeable to plan of care and goals as stated below:     Anticipated Barriers for therapy: None      Medical Necessity is demonstrated by the following  History  Co-morbidities and personal factors that may impact the plan of care Co-morbidities:   NA    Personal Factors:   no deficits     low   Examination  Body Structures and Functions, activity limitations and participation restrictions that may impact the plan of care Body Regions:   lower " extremities    Body Systems:    ROM  strength  motor control    Participation Restrictions:   Walking    Activity limitations:   Learning and applying knowledge  no deficits    General Tasks and Commands  No Deficits    Communication  No Deficits    Mobility  no deficits    Self care  no deficits    Domestic Life  No Deficits    Interactions/Relationships  No Deficits    Life Areas  No Deficits    Community and Social Life  No Deficits         low   Clinical Presentation stable and uncomplicated low   Decision Making/ Complexity Score: low     GOALS:  Short Term Goals (4 Weeks):  1. Patient will be compliant with home exercise program to supplement therapy in restoring pain free function. (progressing, not met)    2. Patient will improve impaired lower extremity manual muscle tests to >/= 4/5 to improve dynamic hip/knee support for functional tasks. (progressing, not met)    3. Pt will tolerate standing/walking/kneeling for 90 minutes with no increase in left knee pain to return to PLOF. (progressing, not met)    4. Pt will improve left knee pain to 4/10 at worst for improved QOL. (progressing, not met)        Long Term Goals (8 Weeks):  1. Patient will improve FOTO score to </= 34% limited to decrease perceived limitation with mobility. (progressing, not met)    2. Patient will improve impaired lower extremity manual muscle tests to >/= 4+/5 to improve dynamic hip/knee support for functional tasks. (progressing, not met)    3. Patient will tolerate standing/walking/kneeling for 2 hours with no increase in left knee pain to return to PLOF. (progressing, not met)    4. Patient will perform all recreational activity without limitation due to left knee pain.   (progressing, not met)          Plan     Plan of care Certification: 4/25/2023 to 7/25/23    Outpatient Physical Therapy 1 times weekly for 12 weeks to include the following interventions: Therapeutic Exercises, Manual Therapeutic Technique, Neuromuscular Re  Education, Therapeutic Activities. Modalities, Kinesiotape prn, and Functional Dry Needling as needed.    Herman Carvalho, PT,  DPT, OCS

## 2023-06-19 ENCOUNTER — CLINICAL SUPPORT (OUTPATIENT)
Dept: UROLOGY | Facility: CLINIC | Age: 53
End: 2023-06-19
Payer: COMMERCIAL

## 2023-06-19 ENCOUNTER — PATIENT MESSAGE (OUTPATIENT)
Dept: UROLOGY | Facility: CLINIC | Age: 53
End: 2023-06-19

## 2023-06-19 NOTE — PROGRESS NOTES
Michael Lowe is a 52 y.o. male status post vasectomy on 4/3/23 here for post-vasectomy SA.     Semen Microscopy   Uncentrifuged fresh semen sample examined under LP and HP microscopy.  No sperm noted in sample.    Patient notified in MyChart.

## 2023-08-04 DIAGNOSIS — I25.10 CORONARY ARTERY DISEASE INVOLVING NATIVE CORONARY ARTERY OF NATIVE HEART WITHOUT ANGINA PECTORIS: ICD-10-CM

## 2023-08-04 DIAGNOSIS — E78.00 PURE HYPERCHOLESTEROLEMIA: ICD-10-CM

## 2023-08-04 NOTE — TELEPHONE ENCOUNTER
No care due was identified.  [facility] Embedded Care Due Messages. Reference number: [id number].   8/04/2023 4:13:01 PM CDT

## 2023-08-07 RX ORDER — ATORVASTATIN CALCIUM 80 MG/1
80 TABLET, FILM COATED ORAL DAILY
Qty: 90 TABLET | Refills: 2 | Status: SHIPPED | OUTPATIENT
Start: 2023-08-07

## 2023-08-07 NOTE — TELEPHONE ENCOUNTER
Refill Decision Note   Michael Lowe  is requesting a refill authorization.  Brief Assessment and Rationale for Refill:  Approve     Medication Therapy Plan:         Comments:     Note composed:12:04 AM 08/07/2023

## 2023-09-28 ENCOUNTER — OFFICE VISIT (OUTPATIENT)
Dept: DERMATOLOGY | Facility: CLINIC | Age: 53
End: 2023-09-28
Payer: COMMERCIAL

## 2023-09-28 VITALS — BODY MASS INDEX: 31.25 KG/M2 | WEIGHT: 250 LBS

## 2023-09-28 DIAGNOSIS — L82.1 SEBORRHEIC KERATOSES: ICD-10-CM

## 2023-09-28 DIAGNOSIS — D22.9 NEVUS OF MULTIPLE SITES: ICD-10-CM

## 2023-09-28 DIAGNOSIS — L81.4 LENTIGINES: Primary | ICD-10-CM

## 2023-09-28 DIAGNOSIS — Z12.83 SKIN EXAM, SCREENING FOR CANCER: ICD-10-CM

## 2023-09-28 DIAGNOSIS — D22.39 FIBROUS PAPULE OF NOSE: ICD-10-CM

## 2023-09-28 PROCEDURE — 99999 PR PBB SHADOW E&M-EST. PATIENT-LVL III: CPT | Mod: PBBFAC,,, | Performed by: DERMATOLOGY

## 2023-09-28 PROCEDURE — 99999 PR PBB SHADOW E&M-EST. PATIENT-LVL III: ICD-10-PCS | Mod: PBBFAC,,, | Performed by: DERMATOLOGY

## 2023-09-28 PROCEDURE — 99213 OFFICE O/P EST LOW 20 MIN: CPT | Mod: S$GLB,,, | Performed by: DERMATOLOGY

## 2023-09-28 PROCEDURE — 99213 PR OFFICE/OUTPT VISIT, EST, LEVL III, 20-29 MIN: ICD-10-PCS | Mod: S$GLB,,, | Performed by: DERMATOLOGY

## 2023-09-28 NOTE — PROGRESS NOTES
Subjective:      Patient ID:  Michael Lowe is a 52 y.o. male who presents for   Chief Complaint   Patient presents with    Skin Check     Would like skin check no lesions of concern.        Review of Systems   Constitutional:  Negative for fever, chills, weight loss, weight gain, fatigue, night sweats and malaise.   Skin:  Negative for daily sunscreen use and activity-related sunscreen use.   Hematologic/Lymphatic: Bruises/bleeds easily.       Objective:   Physical Exam   Constitutional: He appears well-developed and well-nourished. No distress.   Neurological: He is alert and oriented to person, place, and time. He is not disoriented.   Psychiatric: He has a normal mood and affect.   Skin:   Areas Examined (abnormalities noted in diagram):   Scalp / Hair Palpated and Inspected  Head / Face Inspection Performed  Neck Inspection Performed  Chest / Axilla Inspection Performed  Abdomen Inspection Performed  Back Inspection Performed  RUE Inspected  LUE Inspection Performed  RLE Inspected  LLE Inspection Performed  Nails and Digits Inspection Performed                 Diagram Legend     Erythematous scaling macule/papule c/w actinic keratosis       Vascular papule c/w angioma      Pigmented verrucoid papule/plaque c/w seborrheic keratosis      Yellow umbilicated papule c/w sebaceous hyperplasia      Irregularly shaped tan macule c/w lentigo     1-2 mm smooth white papules consistent with Milia      Movable subcutaneous cyst with punctum c/w epidermal inclusion cyst      Subcutaneous movable cyst c/w pilar cyst      Firm pink to brown papule c/w dermatofibroma      Pedunculated fleshy papule(s) c/w skin tag(s)      Evenly pigmented macule c/w junctional nevus     Mildly variegated pigmented, slightly irregular-bordered macule c/w mildly atypical nevus      Flesh colored to evenly pigmented papule c/w intradermal nevus       Pink pearly papule/plaque c/w basal cell carcinoma      Erythematous hyperkeratotic  "cursted plaque c/w SCC      Surgical scar with no sign of skin cancer recurrence      Open and closed comedones      Inflammatory papules and pustules      Verrucoid papule consistent consistent with wart     Erythematous eczematous patches and plaques     Dystrophic onycholytic nail with subungual debris c/w onychomycosis     Umbilicated papule    Erythematous-base heme-crusted tan verrucoid plaque consistent with inflamed seborrheic keratosis     Erythematous Silvery Scaling Plaque c/w Psoriasis     See annotation      Assessment / Plan:        Lentigines  The "ABCD" rules to observe pigmented lesions were reviewed.      Seborrheic keratoses  Seborrheic keratosis scattered, told benign no treatment needed.  Brochure provided.      Nevus of multiple sites  The "ABCD" rules to observe pigmented lesions were reviewed.  Brochure provided      Fibrous papule of nose  reassurance      Skin exam, screening for cancer   No lesions suspicious for malignancy noted.    Recommend daily sun protection/avoidance and use of at least SPF 30, broad spectrum sunscreen (OTC drug).                Follow up in about 2 years (around 9/28/2025).  "

## 2023-10-16 ENCOUNTER — TELEPHONE (OUTPATIENT)
Dept: INTERNAL MEDICINE | Facility: CLINIC | Age: 53
End: 2023-10-16
Payer: COMMERCIAL

## 2023-10-16 NOTE — TELEPHONE ENCOUNTER
Returned patient's call to address concern; LVM to inquire patient's mom's full name and  to lab (INR) order to be sent for review with Dr. Marie MD. Provided office number for return call.

## 2023-10-16 NOTE — TELEPHONE ENCOUNTER
----- Message from Theresa Limon sent at 10/16/2023 10:32 AM CDT -----  Regarding: Patient call back  .Type: Patient Call Back    Who called:self     What is the request in detail:states his mom has never been with [facility] before and would like to know if Dr. Salazar can put a lab order in for INR. Caller states his mom does not have a chart with [facility] and declined when offered to create a chart for her     Can the clinic reply by [facility]?no     Would the patient rather a call back or a response via [facility]? Call     Best call back number:[phone]    Additional Information:

## 2024-01-11 ENCOUNTER — OFFICE VISIT (OUTPATIENT)
Dept: INTERNAL MEDICINE | Facility: CLINIC | Age: 54
End: 2024-01-11
Attending: INTERNAL MEDICINE
Payer: COMMERCIAL

## 2024-01-11 ENCOUNTER — LAB VISIT (OUTPATIENT)
Dept: LAB | Facility: OTHER | Age: 54
End: 2024-01-11
Attending: INTERNAL MEDICINE
Payer: COMMERCIAL

## 2024-01-11 VITALS
DIASTOLIC BLOOD PRESSURE: 76 MMHG | WEIGHT: 254.63 LBS | HEIGHT: 75 IN | BODY MASS INDEX: 31.66 KG/M2 | HEART RATE: 72 BPM | OXYGEN SATURATION: 97 % | SYSTOLIC BLOOD PRESSURE: 120 MMHG

## 2024-01-11 DIAGNOSIS — Z12.5 PROSTATE CANCER SCREENING: ICD-10-CM

## 2024-01-11 DIAGNOSIS — Z00.00 ANNUAL PHYSICAL EXAM: Primary | ICD-10-CM

## 2024-01-11 DIAGNOSIS — R07.2 PRECORDIAL PAIN: ICD-10-CM

## 2024-01-11 DIAGNOSIS — I25.10 CORONARY ARTERY DISEASE INVOLVING NATIVE CORONARY ARTERY OF NATIVE HEART WITHOUT ANGINA PECTORIS: ICD-10-CM

## 2024-01-11 DIAGNOSIS — Z00.00 ANNUAL PHYSICAL EXAM: ICD-10-CM

## 2024-01-11 LAB
ALBUMIN SERPL BCP-MCNC: 4.4 G/DL (ref 3.5–5.2)
ALP SERPL-CCNC: 85 U/L (ref 55–135)
ALT SERPL W/O P-5'-P-CCNC: 107 U/L (ref 10–44)
ANION GAP SERPL CALC-SCNC: 8 MMOL/L (ref 8–16)
AST SERPL-CCNC: 64 U/L (ref 10–40)
BASOPHILS # BLD AUTO: 0.02 K/UL (ref 0–0.2)
BASOPHILS NFR BLD: 0.3 % (ref 0–1.9)
BILIRUB SERPL-MCNC: 1.3 MG/DL (ref 0.1–1)
BUN SERPL-MCNC: 11 MG/DL (ref 6–20)
CALCIUM SERPL-MCNC: 9.6 MG/DL (ref 8.7–10.5)
CHLORIDE SERPL-SCNC: 106 MMOL/L (ref 95–110)
CHOLEST SERPL-MCNC: 153 MG/DL (ref 120–199)
CHOLEST/HDLC SERPL: 3.9 {RATIO} (ref 2–5)
CO2 SERPL-SCNC: 26 MMOL/L (ref 23–29)
COMPLEXED PSA SERPL-MCNC: 0.86 NG/ML (ref 0–4)
CREAT SERPL-MCNC: 0.9 MG/DL (ref 0.5–1.4)
DIFFERENTIAL METHOD BLD: ABNORMAL
EOSINOPHIL # BLD AUTO: 0 K/UL (ref 0–0.5)
EOSINOPHIL NFR BLD: 0.2 % (ref 0–8)
ERYTHROCYTE [DISTWIDTH] IN BLOOD BY AUTOMATED COUNT: 11.8 % (ref 11.5–14.5)
EST. GFR  (NO RACE VARIABLE): >60 ML/MIN/1.73 M^2
ESTIMATED AVG GLUCOSE: 126 MG/DL (ref 68–131)
GLUCOSE SERPL-MCNC: 116 MG/DL (ref 70–110)
HBA1C MFR BLD: 6 % (ref 4–5.6)
HCT VFR BLD AUTO: 42.1 % (ref 40–54)
HDLC SERPL-MCNC: 39 MG/DL (ref 40–75)
HDLC SERPL: 25.5 % (ref 20–50)
HGB BLD-MCNC: 14.3 G/DL (ref 14–18)
IMM GRANULOCYTES # BLD AUTO: 0.01 K/UL (ref 0–0.04)
IMM GRANULOCYTES NFR BLD AUTO: 0.2 % (ref 0–0.5)
LDLC SERPL CALC-MCNC: 93.2 MG/DL (ref 63–159)
LYMPHOCYTES # BLD AUTO: 1.7 K/UL (ref 1–4.8)
LYMPHOCYTES NFR BLD: 30 % (ref 18–48)
MCH RBC QN AUTO: 31.6 PG (ref 27–31)
MCHC RBC AUTO-ENTMCNC: 34 G/DL (ref 32–36)
MCV RBC AUTO: 93 FL (ref 82–98)
MONOCYTES # BLD AUTO: 0.4 K/UL (ref 0.3–1)
MONOCYTES NFR BLD: 6.5 % (ref 4–15)
NEUTROPHILS # BLD AUTO: 3.6 K/UL (ref 1.8–7.7)
NEUTROPHILS NFR BLD: 62.8 % (ref 38–73)
NONHDLC SERPL-MCNC: 114 MG/DL
NRBC BLD-RTO: 0 /100 WBC
PLATELET # BLD AUTO: 277 K/UL (ref 150–450)
PMV BLD AUTO: 8.9 FL (ref 9.2–12.9)
POTASSIUM SERPL-SCNC: 4.4 MMOL/L (ref 3.5–5.1)
PROT SERPL-MCNC: 7.5 G/DL (ref 6–8.4)
RBC # BLD AUTO: 4.53 M/UL (ref 4.6–6.2)
SODIUM SERPL-SCNC: 140 MMOL/L (ref 136–145)
TRIGL SERPL-MCNC: 104 MG/DL (ref 30–150)
TROPONIN I SERPL DL<=0.01 NG/ML-MCNC: 0.02 NG/ML (ref 0–0.03)
TSH SERPL DL<=0.005 MIU/L-ACNC: 1.33 UIU/ML (ref 0.4–4)
WBC # BLD AUTO: 5.73 K/UL (ref 3.9–12.7)

## 2024-01-11 PROCEDURE — 84484 ASSAY OF TROPONIN QUANT: CPT | Performed by: INTERNAL MEDICINE

## 2024-01-11 PROCEDURE — 80053 COMPREHEN METABOLIC PANEL: CPT | Performed by: INTERNAL MEDICINE

## 2024-01-11 PROCEDURE — 84443 ASSAY THYROID STIM HORMONE: CPT | Performed by: INTERNAL MEDICINE

## 2024-01-11 PROCEDURE — 93010 ELECTROCARDIOGRAM REPORT: CPT | Mod: S$GLB,,, | Performed by: INTERNAL MEDICINE

## 2024-01-11 PROCEDURE — 83036 HEMOGLOBIN GLYCOSYLATED A1C: CPT | Performed by: INTERNAL MEDICINE

## 2024-01-11 PROCEDURE — 99999 PR PBB SHADOW E&M-EST. PATIENT-LVL III: CPT | Mod: PBBFAC,,, | Performed by: INTERNAL MEDICINE

## 2024-01-11 PROCEDURE — 93005 ELECTROCARDIOGRAM TRACING: CPT | Mod: S$GLB,,, | Performed by: INTERNAL MEDICINE

## 2024-01-11 PROCEDURE — 84153 ASSAY OF PSA TOTAL: CPT | Performed by: INTERNAL MEDICINE

## 2024-01-11 PROCEDURE — 99214 OFFICE O/P EST MOD 30 MIN: CPT | Mod: S$GLB,,, | Performed by: INTERNAL MEDICINE

## 2024-01-11 PROCEDURE — 36415 COLL VENOUS BLD VENIPUNCTURE: CPT | Performed by: INTERNAL MEDICINE

## 2024-01-11 PROCEDURE — 85025 COMPLETE CBC W/AUTO DIFF WBC: CPT | Performed by: INTERNAL MEDICINE

## 2024-01-11 PROCEDURE — 80061 LIPID PANEL: CPT | Performed by: INTERNAL MEDICINE

## 2024-01-11 NOTE — PROGRESS NOTES
Subjective:       Patient ID: Michael Lowe is a 53 y.o. male.    Chief Complaint: Annual Exam    Here for annual exam    Atypical chest pain, yesterday while working. Right lower chest. Near constant for hours but can not be certain. Felt like was triggered by movement. Pressure like. Non radiating.     ### cervical DJD ###  C5-C6 herniated disc. Ulnar and medial nerve entrapment s/p scar tissues removal from elbow and carpal tunnel release. He has not regained full fine motor control but symptoms are manageable. No significant pain issues.       ### CAD non obstructive  ###  4/18/18 Calcium Ct score- Agatston score 101-400: Moderate plaque burden. High cardiovascular disease risk.  He is tolerating atorvastatin 40 mg.                   LDLCALC                  84.4                03/21/2023 10:25 AM        LDLCALC                  86.4                12/16/2021 09:50 AM        LDLCALC                  91.0                10/08/2020 08:39 AM        LDLCALC                  97.8                01/24/2020 09:35 AM        LDLCALC                  80.0                05/23/2019 09:40 AM        LDLCALC                  91.6                06/12/2018 08:05 AM        LDLCALC                  204.4 (H)           04/18/2018 09:32 AM        LDLCALC                  181.8 (H)           06/30/2017 11:39 AM        LDLCALC                  188.8 (H)           02/04/2016 11:42 AM        LDLCALC                  161.0 (H)           08/19/2013 08:36 AM         Review of Systems   Constitutional:  Negative for appetite change, chills, fever and unexpected weight change.   HENT:  Negative for hearing loss, sore throat and trouble swallowing.    Eyes:  Negative for visual disturbance.   Respiratory:  Negative for cough, chest tightness and shortness of breath.    Cardiovascular:  Negative for chest pain and leg swelling.   Gastrointestinal:  Negative for abdominal pain, blood in stool, constipation, diarrhea, nausea and  "vomiting.   Endocrine: Negative for polydipsia and polyuria.   Genitourinary:  Negative for decreased urine volume, difficulty urinating, dysuria, frequency and urgency.   Musculoskeletal:  Negative for gait problem.   Skin:  Negative for rash.   Neurological:  Negative for dizziness and numbness.   Psychiatric/Behavioral:  The patient is not nervous/anxious.        Objective:      Vitals:    01/11/24 0946   BP: 120/76   BP Location: Left arm   Patient Position: Sitting   Pulse: 72   SpO2: 97%   Weight: 115.5 kg (254 lb 10.1 oz)   Height: 6' 3" (1.905 m)      Physical Exam  Vitals and nursing note reviewed.   Constitutional:       General: He is not in acute distress.     Appearance: Normal appearance. He is well-developed.   HENT:      Head: Normocephalic and atraumatic.      Mouth/Throat:      Pharynx: No oropharyngeal exudate.   Eyes:      General: No scleral icterus.     Conjunctiva/sclera: Conjunctivae normal.      Pupils: Pupils are equal, round, and reactive to light.   Neck:      Thyroid: No thyromegaly.   Cardiovascular:      Rate and Rhythm: Normal rate and regular rhythm.      Heart sounds: Normal heart sounds. No murmur heard.  Pulmonary:      Effort: Pulmonary effort is normal.      Breath sounds: Normal breath sounds. No wheezing or rales.   Abdominal:      General: There is no distension.   Musculoskeletal:         General: No tenderness.   Lymphadenopathy:      Cervical: No cervical adenopathy.   Skin:     General: Skin is warm and dry.   Neurological:      Mental Status: He is alert and oriented to person, place, and time.   Psychiatric:         Behavior: Behavior normal.         Assessment:       1. Annual physical exam    2. Precordial pain    3. Prostate cancer screening    4. Coronary artery disease involving native coronary artery of native heart without angina pectoris        Plan:       Michael was seen today for annual exam.    Diagnoses and all orders for this visit:    Annual physical exam  -  "    Comprehensive Metabolic Panel; Future  -     Lipid Panel; Future  -     TSH; Future  -     CBC Auto Differential; Future  -     Hemoglobin A1C; Future    Precordial pain  -     SCHEDULED EKG 12-LEAD (to Muse); Future  -     IN OFFICE EKG 12-LEAD (to Muse)  -     Troponin I; Future    Prostate cancer screening  -     PSA, Screening; Future    Coronary artery disease involving native coronary artery of native heart without angina pectoris           Basilio Blum MD  Internal Medicine-[facility]        Side effects of medication(s) were discussed in detail and patient voiced understanding.  Patient will call back for any issues or complications.

## 2024-01-12 NOTE — PROGRESS NOTES
I have reviewed your recent lab work.  Your labs look good, except:    1) Liver enzymes remain elevated and have worsened some. They have not increase significantly but the continued elevation should not be interpreted as entirely good news. This means there is continued insult to the liver. Most common causes in [place] are alcohol and our weight and diet. Science is trying to figure out exactly what part of our diet but minimizing saturated fats from animal products, highly refined sugars, ultra processed foods and being overweight or obese.    2)Your hemoglobin A1c, which measures your average blood sugar over the last 3 months, is elevated.  You do not have diabetes, but you do have what we call pre-diabetes, meaning that you are at an increased risk of developing overt diabetes.  I recommend a low carbohydrate diet in addition to weight loss.  This will drastically decrease your likelihood of developing diabetes. We will repeat blood work in 6-12 months to monitor this.      Your prostate cancer screening test is normal.  Your kidney function is normal.  You do not have diabetes.  Your thyroid studies are normal.  Your cholesterol levels look good but increasing.    If you have any questions or concerns about particular lab results please notify me via [facility] messaging or by calling our office at [phone].    Respectfully,  Basilio Blum

## 2024-01-19 ENCOUNTER — TELEPHONE (OUTPATIENT)
Dept: INTERNAL MEDICINE | Facility: CLINIC | Age: 54
End: 2024-01-19
Payer: COMMERCIAL

## 2024-01-19 ENCOUNTER — PATIENT MESSAGE (OUTPATIENT)
Dept: INTERNAL MEDICINE | Facility: CLINIC | Age: 54
End: 2024-01-19
Payer: COMMERCIAL

## 2024-01-19 RX ORDER — EFINACONAZOLE 100 MG/ML
SOLUTION TOPICAL
Qty: 8 ML | Refills: 1 | Status: SHIPPED | OUTPATIENT
Start: 2024-01-19

## 2024-01-19 NOTE — TELEPHONE ENCOUNTER
Pt is asking about the diabetes shot and if it is covered by his insurance. Also about fungus toenail topical cream that you guys dicussed during visit.

## 2024-02-01 ENCOUNTER — TELEPHONE (OUTPATIENT)
Dept: INTERNAL MEDICINE | Facility: CLINIC | Age: 54
End: 2024-02-01
Payer: COMMERCIAL

## 2024-02-01 NOTE — TELEPHONE ENCOUNTER
Pt Jublia (efinaconazole top soln) was not approved by insurance.     Denial reason: don't meet the requirements of plan     Requirements of plan: specific fungal infection of toenails  and test done to confirm fungal infection

## 2024-02-02 RX ORDER — CICLOPIROX 80 MG/ML
SOLUTION TOPICAL NIGHTLY
Qty: 6.6 ML | Refills: 11 | Status: SHIPPED | OUTPATIENT
Start: 2024-02-02 | End: 2024-02-16

## 2024-02-16 ENCOUNTER — PATIENT MESSAGE (OUTPATIENT)
Dept: INTERNAL MEDICINE | Facility: CLINIC | Age: 54
End: 2024-02-16
Payer: COMMERCIAL

## 2024-02-16 RX ORDER — CICLOPIROX 80 MG/ML
SOLUTION TOPICAL DAILY
Qty: 6.6 ML | Refills: 3 | Status: SHIPPED | OUTPATIENT
Start: 2024-02-16

## 2024-02-19 ENCOUNTER — TELEPHONE (OUTPATIENT)
Dept: INTERNAL MEDICINE | Facility: CLINIC | Age: 54
End: 2024-02-19
Payer: COMMERCIAL

## 2024-03-14 ENCOUNTER — TELEPHONE (OUTPATIENT)
Dept: INTERNAL MEDICINE | Facility: CLINIC | Age: 54
End: 2024-03-14
Payer: COMMERCIAL

## 2024-03-14 ENCOUNTER — PATIENT MESSAGE (OUTPATIENT)
Dept: INTERNAL MEDICINE | Facility: CLINIC | Age: 54
End: 2024-03-14
Payer: COMMERCIAL

## 2024-03-14 NOTE — TELEPHONE ENCOUNTER
"Pt would like to know why he is being scheduled for a Ct Scan. He said it was not discussed in the visit. I do see in the summary you said:    "Isolated episode of coughing up sputum with blood. Former smoker. Quit 18 years prior. Pt denies SOB at rest or BARNEY, PND, chest tightness, wheezing."    Maybe this is the reason? Please advise. Thanks  "

## 2024-03-21 ENCOUNTER — HOSPITAL ENCOUNTER (OUTPATIENT)
Dept: RADIOLOGY | Facility: OTHER | Age: 54
Discharge: HOME | End: 2024-03-21
Attending: INTERNAL MEDICINE
Payer: COMMERCIAL

## 2024-03-21 DIAGNOSIS — Z87.891 FORMER SMOKER: ICD-10-CM

## 2024-03-21 PROCEDURE — 71271 CT THORAX LUNG CANCER SCR C-: CPT | Mod: TC

## 2024-03-21 PROCEDURE — 71271 CT THORAX LUNG CANCER SCR C-: CPT | Mod: 26,,, | Performed by: RADIOLOGY

## 2024-03-22 ENCOUNTER — PATIENT MESSAGE (OUTPATIENT)
Dept: INTERNAL MEDICINE | Facility: CLINIC | Age: 54
End: 2024-03-22
Payer: COMMERCIAL

## 2024-03-22 ENCOUNTER — TELEPHONE (OUTPATIENT)
Dept: INTERNAL MEDICINE | Facility: CLINIC | Age: 54
End: 2024-03-22
Payer: COMMERCIAL

## 2024-03-22 NOTE — TELEPHONE ENCOUNTER
"Procedure APPROVED & completed 03/21/24.    "   CT CHEST LUNG SCREENING LOW DOSE- Pending  Received: 4 days ago  Tracy, April T  [facility] Staff  Good Morning,    Authorization request for CT CHEST LUNG SCREENING LOW DOSE is pending review with the patient's insurance. Please reschedule the appointment to allow more time for the auth to process. Turnaround time is 7 business days.    Thank you,  April  Pre-service  [phone]    "  "

## 2024-03-22 NOTE — TELEPHONE ENCOUNTER
Hey doc      Glad there is no cancer.   I havent smoked in almost 20 years though.   Im actually more concerned about calcification of the arteries

## 2024-03-27 NOTE — TELEPHONE ENCOUNTER
So no further action on the calcification?  Am I a hamburger away from having a major event or do I have time ?

## 2024-05-18 DIAGNOSIS — E78.00 PURE HYPERCHOLESTEROLEMIA: ICD-10-CM

## 2024-05-18 DIAGNOSIS — I25.10 CORONARY ARTERY DISEASE INVOLVING NATIVE CORONARY ARTERY OF NATIVE HEART WITHOUT ANGINA PECTORIS: ICD-10-CM

## 2024-05-18 NOTE — TELEPHONE ENCOUNTER
No care due was identified.  [facility] Embedded Care Due Messages. Reference number: [id number].   5/18/2024 9:57:00 AM CDT

## 2024-05-20 RX ORDER — ATORVASTATIN CALCIUM 80 MG/1
80 TABLET, FILM COATED ORAL DAILY
Qty: 90 TABLET | Refills: 2 | Status: SHIPPED | OUTPATIENT
Start: 2024-05-20

## 2024-05-20 NOTE — TELEPHONE ENCOUNTER
Refill Decision Note   Michael Lowe  is requesting a refill authorization.  Brief Assessment and Rationale for Refill:  Approve     Medication Therapy Plan:         Comments:     Note composed:12:42 PM 05/20/2024

## 2024-07-23 ENCOUNTER — OFFICE VISIT (OUTPATIENT)
Dept: INTERNAL MEDICINE | Facility: CLINIC | Age: 54
End: 2024-07-23
Attending: INTERNAL MEDICINE
Payer: COMMERCIAL

## 2024-07-23 ENCOUNTER — LAB VISIT (OUTPATIENT)
Dept: LAB | Facility: OTHER | Age: 54
End: 2024-07-23
Attending: INTERNAL MEDICINE
Payer: COMMERCIAL

## 2024-07-23 VITALS
HEART RATE: 75 BPM | BODY MASS INDEX: 31.85 KG/M2 | HEIGHT: 75 IN | DIASTOLIC BLOOD PRESSURE: 72 MMHG | SYSTOLIC BLOOD PRESSURE: 122 MMHG | WEIGHT: 256.19 LBS | OXYGEN SATURATION: 97 %

## 2024-07-23 DIAGNOSIS — E78.00 PURE HYPERCHOLESTEROLEMIA: ICD-10-CM

## 2024-07-23 DIAGNOSIS — S01.85XS DOG BITE OF FACE, SEQUELA: ICD-10-CM

## 2024-07-23 DIAGNOSIS — E66.9 OBESITY (BMI 30.0-34.9): ICD-10-CM

## 2024-07-23 DIAGNOSIS — R73.03 PRE-DIABETES: ICD-10-CM

## 2024-07-23 DIAGNOSIS — B35.1 ONYCHOMYCOSIS: ICD-10-CM

## 2024-07-23 DIAGNOSIS — I25.10 CORONARY ARTERY DISEASE INVOLVING NATIVE CORONARY ARTERY OF NATIVE HEART WITHOUT ANGINA PECTORIS: ICD-10-CM
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
## 2024-07-23 DIAGNOSIS — I25.10 CORONARY ARTERY DISEASE INVOLVING NATIVE CORONARY ARTERY OF NATIVE HEART WITHOUT ANGINA PECTORIS: Primary | ICD-10-CM

## 2024-07-23 LAB
ALBUMIN SERPL BCP-MCNC: 4.2 G/DL (ref 3.5–5.2)
ALP SERPL-CCNC: 76 U/L (ref 55–135)
ALT SERPL W/O P-5'-P-CCNC: 77 U/L (ref 10–44)
ANION GAP SERPL CALC-SCNC: 11 MMOL/L (ref 8–16)
AST SERPL-CCNC: 44 U/L (ref 10–40)
BILIRUB SERPL-MCNC: 0.8 MG/DL (ref 0.1–1)
BUN SERPL-MCNC: 12 MG/DL (ref 6–20)
CALCIUM SERPL-MCNC: 9.7 MG/DL (ref 8.7–10.5)
CHLORIDE SERPL-SCNC: 106 MMOL/L (ref 95–110)
CO2 SERPL-SCNC: 25 MMOL/L (ref 23–29)
CREAT SERPL-MCNC: 1 MG/DL (ref 0.5–1.4)
EST. GFR  (NO RACE VARIABLE): >60 ML/MIN/1.73 M^2
ESTIMATED AVG GLUCOSE: 134 MG/DL (ref 68–131)
GLUCOSE SERPL-MCNC: 146 MG/DL (ref 70–110)
HBA1C MFR BLD: 6.3 % (ref 4–5.6)
POTASSIUM SERPL-SCNC: 4.7 MMOL/L (ref 3.5–5.1)
PROT SERPL-MCNC: 7.6 G/DL (ref 6–8.4)
SODIUM SERPL-SCNC: 142 MMOL/L (ref 136–145)

## 2024-07-23 PROCEDURE — 99999 PR PBB SHADOW E&M-EST. PATIENT-LVL III: CPT | Mod: PBBFAC,,, | Performed by: INTERNAL MEDICINE

## 2024-07-23 PROCEDURE — 80053 COMPREHEN METABOLIC PANEL: CPT | Performed by: INTERNAL MEDICINE

## 2024-07-23 PROCEDURE — 36415 COLL VENOUS BLD VENIPUNCTURE: CPT | Performed by: INTERNAL MEDICINE

## 2024-07-23 PROCEDURE — 83036 HEMOGLOBIN GLYCOSYLATED A1C: CPT | Performed by: INTERNAL MEDICINE

## 2024-07-23 PROCEDURE — 99214 OFFICE O/P EST MOD 30 MIN: CPT | Mod: S$GLB,,, | Performed by: INTERNAL MEDICINE

## 2024-07-23 PROCEDURE — G2211 COMPLEX E/M VISIT ADD ON: HCPCS | Mod: S$GLB,,, | Performed by: INTERNAL MEDICINE

## 2024-07-23 RX ORDER — TIRZEPATIDE 2.5 MG/.5ML
2.5 INJECTION, SOLUTION SUBCUTANEOUS
Qty: 4 PEN | Refills: 0 | Status: SHIPPED | OUTPATIENT
Start: 2024-07-23

## 2024-07-23 RX ORDER — CICLOPIROX 80 MG/ML
SOLUTION TOPICAL DAILY
Qty: 6.6 ML | Refills: 3 | Status: SHIPPED | OUTPATIENT
Start: 2024-07-23

## 2024-07-23 NOTE — PROGRESS NOTES
Subjective:       Patient ID: Michael Lowe is a 53 y.o. male.    Chief Complaint: Animal Bite    Here for routine f/u    Five days prior his dog snapped at and caught him on left mandible.  Reports swelling has decreased by about 50% but is significant amount remains.  Some residual tenderness but no increased pain.  No fevers chills. no trismus. No neck pain. No malaise.    53-year-old male with past medical history of CAD, obesity BMI of 32, hyperlipidemia, and prediabetes and  Pt would like to try medication assistance for weight loss in addition to diet and lifestyle changes. Bariatric medicine clinic discussed. GLP agonists and their risk of thyroid cancer, pancreatitis, and dyspepsia discussed. Pt does not have a family hx of MEN, thyroid CA, pancreatitis, nor a personal Hx of pancreatitis. Pt is aware of risk and amenable.     .    ### pre DM ###                HGBA1C                   6.0 (H)             01/11/2024 10:26 AM        HGBA1C                   6.0 (H)             03/21/2023 10:25 AM        HGBA1C                   5.9 (H)             06/16/2022 09:19 AM        HGBA1C                   6.1 (H)             12/16/2021 09:50 AM        HGBA1C                   5.9 (H)             06/25/2021 09:53 AM        HGBA1C                   6.0 (H)             10/08/2020 08:39 AM        HGBA1C                   6.0 (H)             01/24/2020 09:35 AM        HGBA1C                   5.8 (H)             05/23/2019 09:40 AM        HGBA1C                   5.6                 04/18/2018 09:32 AM        HGBA1C                   6.0 (H)             06/30/2017 11:43 AM        ### CAD non obstructive  ###  4/18/18 Calcium Ct score- Agatston score 101-400: Moderate plaque burden. High cardiovascular disease risk.  He is tolerating atorvastatin 40 mg.                   LDLCALC                  84.4                03/21/2023 10:25 AM        LDLCALC                  86.4                12/16/2021 09:50 AM         "LDLCALC                  91.0                10/08/2020 08:39 AM        LDLCALC                  97.8                01/24/2020 09:35 AM        LDLCALC                  80.0                05/23/2019 09:40 AM        LDLCALC                  91.6                06/12/2018 08:05 AM        LDLCALC                  204.4 (H)           04/18/2018 09:32 AM        LDLCALC                  181.8 (H)           06/30/2017 11:39 AM        LDLCALC                  188.8 (H)           02/04/2016 11:42 AM        LDLCALC                  161.0 (H)           08/19/2013 08:36 AM    ### obesity ###    ### cervical DJD ###  C5-C6 herniated disc. Ulnar and medial nerve entrapment s/p scar tissues removal from elbow and carpal tunnel release. He has not regained full fine motor control but symptoms are manageable. No significant pain issues.              Review of Systems   Constitutional:  Negative for appetite change, chills, fever and unexpected weight change.   HENT:  Negative for hearing loss, sore throat and trouble swallowing.    Eyes:  Negative for visual disturbance.   Respiratory:  Negative for cough, chest tightness and shortness of breath.    Cardiovascular:  Negative for chest pain and leg swelling.   Gastrointestinal:  Negative for abdominal pain, blood in stool, constipation, diarrhea, nausea and vomiting.   Endocrine: Negative for polydipsia and polyuria.   Genitourinary:  Negative for decreased urine volume, difficulty urinating, dysuria, frequency and urgency.   Musculoskeletal:  Negative for gait problem.   Skin:  Positive for wound. Negative for rash.   Neurological:  Negative for dizziness and numbness.   Psychiatric/Behavioral:  The patient is not nervous/anxious.        Objective:      Vitals:    07/23/24 1049   BP: 122/72   BP Location: Left arm   Patient Position: Sitting   Pulse: 75   SpO2: 97%   Weight: 116.2 kg (256 lb 2.8 oz)   Height: 6' 3" (1.905 m)      Physical Exam  Vitals and nursing note reviewed. " "  Constitutional:       General: He is not in acute distress.     Appearance: Normal appearance. He is well-developed.   HENT:      Head: Normocephalic and atraumatic.        Comments: 1.5cm round area of induration. Red line is superficial horizontal graze in skin.      Mouth/Throat:      Pharynx: No oropharyngeal exudate.   Eyes:      General: No scleral icterus.     Conjunctiva/sclera: Conjunctivae normal.      Pupils: Pupils are equal, round, and reactive to light.   Neck:      Thyroid: No thyromegaly.   Cardiovascular:      Rate and Rhythm: Normal rate and regular rhythm.      Heart sounds: Normal heart sounds. No murmur heard.  Pulmonary:      Effort: Pulmonary effort is normal.      Breath sounds: Normal breath sounds. No wheezing or rales.   Abdominal:      General: There is no distension.   Musculoskeletal:         General: No tenderness.   Lymphadenopathy:      Cervical: No cervical adenopathy.   Skin:     General: Skin is warm and dry.   Neurological:      Mental Status: He is alert and oriented to person, place, and time.   Psychiatric:         Behavior: Behavior normal.         Assessment:       1. Coronary artery disease involving native coronary artery of native heart without angina pectoris    2. Obesity (BMI 30.0-34.9)    3. Pure hypercholesterolemia    4. Onychomycosis    5. Dog bite of face, sequela    6. Pre-diabetes        Plan:       Michael Killian/[place]" was seen today for animal bite.    Diagnoses and all orders for this visit:    Coronary artery disease involving native coronary artery of native heart without angina pectoris  -     Hemoglobin A1C; Future  -     tirzepatide, weight loss, (ZEPBOUND) 2.5 mg/0.5 mL PnIj; Inject 2.5 mg into the skin every 7 days.    Obesity (BMI 30.0-34.9)  -     Hemoglobin A1C; Future  -     tirzepatide, weight loss, (ZEPBOUND) 2.5 mg/0.5 mL PnIj; Inject 2.5 mg into the skin every 7 days.    Pure hypercholesterolemia  -     Comprehensive Metabolic Panel; Future  -     " Hemoglobin A1C; Future  -     tirzepatide, weight loss, (ZEPBOUND) 2.5 mg/0.5 mL PnIj; Inject 2.5 mg into the skin every 7 days.    Onychomycosis  -     ciclopirox (PENLAC) 8 % Soln; Apply topically once daily. Remove with rubbing alcohol every 7 days    Dog bite of face, sequela   Progressing with residual induration without concern for abscess or cellulitis at this time. Office and Emergency Department prompts discussed. Augementin if pt becomes concerned about worsening.     Pre-diabetes   Update A1c         Visit today is associated with current or anticipated ongoing medical care related to this patient's single serious condition/complex condition of CAD . The patient will return to see me as these issues will be followed longitudinally.      Basilio Blum MD  Internal Medicine-[facility]        Side effects of medication(s) were discussed in detail and patient voiced understanding.  Patient will call back for any issues or complications.

## 2024-07-24 NOTE — PROGRESS NOTES
Your prediabetes is pretty close to diabetes.  If we can not get you on the injectables I would recommend starting metformin 1 tablet twice a day to reduce the chance that you do become a diabetic.    Liver enzymes remain elevated. They have not increase significantly but the continued elevation should not be interpreted as entirely good news. This means there is continued insult to the liver. Most common causes in [place] are alcohol and our weight and diet. Science is trying to figure out exactly what part of our diet but minimizing saturated fats from animal products, highly refined sugars, ultra processed foods and being overweight or obese.

## 2024-08-14 ENCOUNTER — OFFICE VISIT (OUTPATIENT)
Dept: DERMATOLOGY | Facility: CLINIC | Age: 54
End: 2024-08-14
Payer: COMMERCIAL

## 2024-08-14 DIAGNOSIS — Z12.83 SKIN CANCER SCREENING: ICD-10-CM

## 2024-08-14 DIAGNOSIS — L82.1 SK (SEBORRHEIC KERATOSIS): ICD-10-CM

## 2024-08-14 DIAGNOSIS — D22.9 MULTIPLE BENIGN NEVI: ICD-10-CM

## 2024-08-14 DIAGNOSIS — L81.4 LENTIGINES: Primary | ICD-10-CM

## 2024-08-14 DIAGNOSIS — L91.8 ST (SKIN TAG): ICD-10-CM

## 2024-08-14 DIAGNOSIS — D18.01 CHERRY ANGIOMA: ICD-10-CM

## 2024-08-14 PROCEDURE — 99213 OFFICE O/P EST LOW 20 MIN: CPT | Mod: S$GLB,,, | Performed by: DERMATOLOGY

## 2024-08-14 PROCEDURE — 99999 PR PBB SHADOW E&M-EST. PATIENT-LVL III: CPT | Mod: PBBFAC,,, | Performed by: DERMATOLOGY

## 2024-08-14 NOTE — PATIENT INSTRUCTIONS

## 2024-08-14 NOTE — PROGRESS NOTES
Subjective:      Patient ID:  Michael Lowe is a 53 y.o. male who presents for   Chief Complaint   Patient presents with    Skin Check     Tbse      History of Present Illness: The patient presents for follow up of skin check.    The patient was last seen on: 9/28/2023 for skin check by Dr. Quijano.  No h/o of nmsc or mm.     Other skin complaints:   Patient with new area of concern:   Location: behind right knee  Previous treatments: none   Asymptomatic and no prior treatment.    Has skin tags around eyes that he'd like to have removed.    Review of Systems   Skin:  Positive for activity-related sunscreen use and wears hat (always). Negative for daily sunscreen use and recent sunburn.   Hematologic/Lymphatic: Bruises/bleeds easily (on asa).       Objective:   Physical Exam   Constitutional: He appears well-developed and well-nourished. No distress.   Neurological: He is alert and oriented to person, place, and time. He is not disoriented.   Psychiatric: He has a normal mood and affect.   Skin:   Areas Examined (abnormalities noted in diagram):   Scalp / Hair Palpated and Inspected  Head / Face Inspection Performed  Neck Inspection Performed  Chest / Axilla Inspection Performed  Abdomen Inspection Performed  Genitals / Buttocks / Groin Inspection Performed  Back Inspection Performed  RUE Inspected  LUE Inspection Performed  RLE Inspected  LLE Inspection Performed  Nails and Digits Inspection Performed                     Diagram Legend     Erythematous scaling macule/papule c/w actinic keratosis       Vascular papule c/w angioma      Pigmented verrucoid papule/plaque c/w seborrheic keratosis      Yellow umbilicated papule c/w sebaceous hyperplasia      Irregularly shaped tan macule c/w lentigo     1-2 mm smooth white papules consistent with Milia      Movable subcutaneous cyst with punctum c/w epidermal inclusion cyst      Subcutaneous movable cyst c/w pilar cyst      Firm pink to brown papule c/w  dermatofibroma      Pedunculated fleshy papule(s) c/w skin tag(s)      Evenly pigmented macule c/w junctional nevus     Mildly variegated pigmented, slightly irregular-bordered macule c/w mildly atypical nevus      Flesh colored to evenly pigmented papule c/w intradermal nevus       Pink pearly papule/plaque c/w basal cell carcinoma      Erythematous hyperkeratotic cursted plaque c/w SCC      Surgical scar with no sign of skin cancer recurrence      Open and closed comedones      Inflammatory papules and pustules      Verrucoid papule consistent consistent with wart     Erythematous eczematous patches and plaques     Dystrophic onycholytic nail with subungual debris c/w onychomycosis     Umbilicated papule    Erythematous-base heme-crusted tan verrucoid plaque consistent with inflamed seborrheic keratosis     Erythematous Silvery Scaling Plaque c/w Psoriasis     See annotation      Assessment / Plan:        Lentigines  These are benign sun spots which should be monitored for changes. Patient instructed in importance of daily broad spectrum sunscreen use with spf at least 30. Sun avoidance and topical protection/protective clothing discussed.    Multiple benign nevi  Benign-appearing nevi present on exam today. Reassurance provided. Periodically examine moles and return to clinic if any moles change or become symptomatic (bleeding, itching, pain, etc).    SK (seborrheic keratosis)  These are benign inherited growths without a malignant potential. Reassurance given to patient. No treatment is necessary.   Treatment of benign, asymptomatic lesions may be considered cosmetic.  Warned about risk of hypo- or hyperpigmentation with treatment and risk of recurrence.    ST (skin tag)  Reassurance given to patient. No treatment is necessary.   Treatment of benign, asymptomatic lesions may be considered cosmetic.    Cherry angioma  This is a benign vascular lesion. Reassurance given. No treatment required. Treatment of benign,  asymptomatic lesions may be considered cosmetic.    Skin cancer screening  Total body skin examination performed today including at least 12 points as noted in physical examination. No lesions suspicious for malignancy noted.  Patient instructed in importance of daily broad spectrum sunscreen use with spf at least 30. Sun avoidance and topical protection/protective clothing discussed.    Follow up in about 1 year (around 8/14/2025) for skin check or sooner for any concerns.

## 2024-09-13 ENCOUNTER — PATIENT MESSAGE (OUTPATIENT)
Dept: INTERNAL MEDICINE | Facility: CLINIC | Age: 54
End: 2024-09-13
Payer: COMMERCIAL

## 2024-09-13 DIAGNOSIS — E66.9 OBESITY (BMI 30.0-34.9): ICD-10-CM

## 2024-09-13 DIAGNOSIS — E78.00 PURE HYPERCHOLESTEROLEMIA: ICD-10-CM

## 2024-09-13 DIAGNOSIS — I25.10 CORONARY ARTERY DISEASE INVOLVING NATIVE CORONARY ARTERY OF NATIVE HEART WITHOUT ANGINA PECTORIS: ICD-10-CM

## 2024-09-13 RX ORDER — TIRZEPATIDE 2.5 MG/.5ML
2.5 INJECTION, SOLUTION SUBCUTANEOUS
Qty: 4 PEN | Refills: 0 | Status: CANCELLED | OUTPATIENT
Start: 2024-09-13

## 2024-09-13 RX ORDER — TIRZEPATIDE 5 MG/.5ML
5 INJECTION, SOLUTION SUBCUTANEOUS
Qty: 4 PEN | Refills: 0 | Status: SHIPPED | OUTPATIENT
Start: 2024-09-14

## 2024-09-13 NOTE — TELEPHONE ENCOUNTER
No care due was identified.  [facility] Embedded Care Due Messages. Reference number: [id number].   9/13/2024 9:11:20 AM CDT

## 2024-09-16 ENCOUNTER — PROCEDURE VISIT (OUTPATIENT)
Dept: DERMATOLOGY | Facility: CLINIC | Age: 54
End: 2024-09-16
Payer: COMMERCIAL

## 2024-09-16 DIAGNOSIS — L91.8 ST (SKIN TAG): Primary | ICD-10-CM

## 2024-09-16 PROCEDURE — 11200 RMVL SKIN TAGS UP TO&INC 15: CPT | Mod: CSM,,, | Performed by: DERMATOLOGY

## 2024-09-16 NOTE — PROGRESS NOTES
Subjective:      Patient ID:  Michael Lowe is a 53 y.o. male who presents for   Chief Complaint   Patient presents with    Skin Tags     Skin Tags    Pt here today for removal of skin tags around eyes.    Review of Systems    Objective:   Physical Exam   Constitutional: He appears well-developed and well-nourished. No distress.   Neurological: He is alert and oriented to person, place, and time. He is not disoriented.   Psychiatric: He has a normal mood and affect.   Skin:   Areas Examined (abnormalities noted in diagram):   Head / Face Inspection Performed            Diagram Legend     Erythematous scaling macule/papule c/w actinic keratosis       Vascular papule c/w angioma      Pigmented verrucoid papule/plaque c/w seborrheic keratosis      Yellow umbilicated papule c/w sebaceous hyperplasia      Irregularly shaped tan macule c/w lentigo     1-2 mm smooth white papules consistent with Milia      Movable subcutaneous cyst with punctum c/w epidermal inclusion cyst      Subcutaneous movable cyst c/w pilar cyst      Firm pink to brown papule c/w dermatofibroma      Pedunculated fleshy papule(s) c/w skin tag(s)      Evenly pigmented macule c/w junctional nevus     Mildly variegated pigmented, slightly irregular-bordered macule c/w mildly atypical nevus      Flesh colored to evenly pigmented papule c/w intradermal nevus       Pink pearly papule/plaque c/w basal cell carcinoma      Erythematous hyperkeratotic cursted plaque c/w SCC      Surgical scar with no sign of skin cancer recurrence      Open and closed comedones      Inflammatory papules and pustules      Verrucoid papule consistent consistent with wart     Erythematous eczematous patches and plaques     Dystrophic onycholytic nail with subungual debris c/w onychomycosis     Umbilicated papule    Erythematous-base heme-crusted tan verrucoid plaque consistent with inflamed seborrheic keratosis     Erythematous Silvery Scaling Plaque c/w Psoriasis     See  annotation      Assessment / Plan:        ST (skin tag)  Verbal consent obtained. ~10 lesions removed with scissor snip removal after anesthesia with 1% lidocaine with epinephrine. Hemostasis achieved with aluminum chloride and hyfrecation. No complications.    Rtc prn

## 2024-09-23 NOTE — PATIENT INSTRUCTIONS

## 2024-12-05 ENCOUNTER — LAB VISIT (OUTPATIENT)
Dept: LAB | Facility: OTHER | Age: 54
End: 2024-12-05
Attending: INTERNAL MEDICINE
Payer: COMMERCIAL

## 2024-12-05 ENCOUNTER — OFFICE VISIT (OUTPATIENT)
Dept: INTERNAL MEDICINE | Facility: CLINIC | Age: 54
End: 2024-12-05
Attending: INTERNAL MEDICINE
Payer: COMMERCIAL

## 2024-12-05 VITALS
SYSTOLIC BLOOD PRESSURE: 126 MMHG | DIASTOLIC BLOOD PRESSURE: 78 MMHG | BODY MASS INDEX: 29.41 KG/M2 | HEART RATE: 65 BPM | HEIGHT: 75 IN | WEIGHT: 236.56 LBS | OXYGEN SATURATION: 96 %

## 2024-12-05 DIAGNOSIS — Z00.00 ANNUAL PHYSICAL EXAM: Primary | ICD-10-CM

## 2024-12-05 DIAGNOSIS — Z12.5 PROSTATE CANCER SCREENING: ICD-10-CM

## 2024-12-05 DIAGNOSIS — I25.10 CORONARY ARTERY DISEASE INVOLVING NATIVE CORONARY ARTERY OF NATIVE HEART WITHOUT ANGINA PECTORIS: ICD-10-CM

## 2024-12-05 DIAGNOSIS — E66.811 OBESITY (BMI 30.0-34.9): ICD-10-CM

## 2024-12-05 DIAGNOSIS — Z00.00 ANNUAL PHYSICAL EXAM: ICD-10-CM

## 2024-12-05 LAB
ALBUMIN SERPL BCP-MCNC: 4.2 G/DL (ref 3.5–5.2)
ALP SERPL-CCNC: 90 U/L (ref 40–150)
ALT SERPL W/O P-5'-P-CCNC: 117 U/L (ref 10–44)
ANION GAP SERPL CALC-SCNC: 9 MMOL/L (ref 8–16)
AST SERPL-CCNC: 58 U/L (ref 10–40)
BASOPHILS # BLD AUTO: 0.02 K/UL (ref 0–0.2)
BASOPHILS NFR BLD: 0.3 % (ref 0–1.9)
BILIRUB SERPL-MCNC: 0.7 MG/DL (ref 0.1–1)
BUN SERPL-MCNC: 14 MG/DL (ref 6–20)
CALCIUM SERPL-MCNC: 9.5 MG/DL (ref 8.7–10.5)
CHLORIDE SERPL-SCNC: 107 MMOL/L (ref 95–110)
CHOLEST SERPL-MCNC: 129 MG/DL (ref 120–199)
CHOLEST/HDLC SERPL: 3.3 {RATIO} (ref 2–5)
CO2 SERPL-SCNC: 25 MMOL/L (ref 23–29)
COMPLEXED PSA SERPL-MCNC: 1.2 NG/ML (ref 0–4)
CREAT SERPL-MCNC: 0.8 MG/DL (ref 0.5–1.4)
DIFFERENTIAL METHOD BLD: ABNORMAL
EOSINOPHIL # BLD AUTO: 0 K/UL (ref 0–0.5)
EOSINOPHIL NFR BLD: 0.6 % (ref 0–8)
ERYTHROCYTE [DISTWIDTH] IN BLOOD BY AUTOMATED COUNT: 12.3 % (ref 11.5–14.5)
EST. GFR  (NO RACE VARIABLE): >60 ML/MIN/1.73 M^2
ESTIMATED AVG GLUCOSE: 114 MG/DL (ref 68–131)
GLUCOSE SERPL-MCNC: 102 MG/DL (ref 70–110)
HBA1C MFR BLD: 5.6 % (ref 4–5.6)
HCT VFR BLD AUTO: 40.2 % (ref 40–54)
HDLC SERPL-MCNC: 39 MG/DL (ref 40–75)
HDLC SERPL: 30.2 % (ref 20–50)
HGB BLD-MCNC: 13.7 G/DL (ref 14–18)
IMM GRANULOCYTES # BLD AUTO: 0.01 K/UL (ref 0–0.04)
IMM GRANULOCYTES NFR BLD AUTO: 0.2 % (ref 0–0.5)
LDLC SERPL CALC-MCNC: 70.8 MG/DL (ref 63–159)
LYMPHOCYTES # BLD AUTO: 2.4 K/UL (ref 1–4.8)
LYMPHOCYTES NFR BLD: 36.5 % (ref 18–48)
MCH RBC QN AUTO: 32.9 PG (ref 27–31)
MCHC RBC AUTO-ENTMCNC: 34.1 G/DL (ref 32–36)
MCV RBC AUTO: 96 FL (ref 82–98)
MONOCYTES # BLD AUTO: 0.7 K/UL (ref 0.3–1)
MONOCYTES NFR BLD: 9.8 % (ref 4–15)
NEUTROPHILS # BLD AUTO: 3.5 K/UL (ref 1.8–7.7)
NEUTROPHILS NFR BLD: 52.6 % (ref 38–73)
NONHDLC SERPL-MCNC: 90 MG/DL
NRBC BLD-RTO: 0 /100 WBC
PLATELET # BLD AUTO: 248 K/UL (ref 150–450)
PMV BLD AUTO: 8.9 FL (ref 9.2–12.9)
POTASSIUM SERPL-SCNC: 4.9 MMOL/L (ref 3.5–5.1)
PROT SERPL-MCNC: 6.9 G/DL (ref 6–8.4)
RBC # BLD AUTO: 4.17 M/UL (ref 4.6–6.2)
SODIUM SERPL-SCNC: 141 MMOL/L (ref 136–145)
TRIGL SERPL-MCNC: 96 MG/DL (ref 30–150)
TSH SERPL DL<=0.005 MIU/L-ACNC: 1.68 UIU/ML (ref 0.4–4)
WBC # BLD AUTO: 6.63 K/UL (ref 3.9–12.7)

## 2024-12-05 PROCEDURE — 84443 ASSAY THYROID STIM HORMONE: CPT | Performed by: INTERNAL MEDICINE

## 2024-12-05 PROCEDURE — 84153 ASSAY OF PSA TOTAL: CPT | Performed by: INTERNAL MEDICINE

## 2024-12-05 PROCEDURE — 36415 COLL VENOUS BLD VENIPUNCTURE: CPT | Performed by: INTERNAL MEDICINE

## 2024-12-05 PROCEDURE — 80053 COMPREHEN METABOLIC PANEL: CPT | Performed by: INTERNAL MEDICINE

## 2024-12-05 PROCEDURE — 80061 LIPID PANEL: CPT | Performed by: INTERNAL MEDICINE

## 2024-12-05 PROCEDURE — 85025 COMPLETE CBC W/AUTO DIFF WBC: CPT | Performed by: INTERNAL MEDICINE

## 2024-12-05 PROCEDURE — 83036 HEMOGLOBIN GLYCOSYLATED A1C: CPT | Performed by: INTERNAL MEDICINE

## 2024-12-05 PROCEDURE — 99999 PR PBB SHADOW E&M-EST. PATIENT-LVL III: CPT | Mod: PBBFAC,,, | Performed by: INTERNAL MEDICINE

## 2024-12-05 RX ORDER — TIRZEPATIDE 5 MG/.5ML
5 INJECTION, SOLUTION SUBCUTANEOUS
Qty: 12 PEN | Refills: 0 | Status: SHIPPED | OUTPATIENT
Start: 2024-12-05

## 2024-12-05 RX ORDER — SILDENAFIL 100 MG/1
100 TABLET, FILM COATED ORAL DAILY PRN
Qty: 30 TABLET | Refills: 11 | Status: SHIPPED | OUTPATIENT
Start: 2024-12-05

## 2024-12-05 NOTE — PROGRESS NOTES
Subjective:       Patient ID: Michael Lowe is a 54 y.o. male.    Chief Complaint: Diabetes (Dm f/u. F/u on Labs and medications. )    Here for urgent visit    Left knee pain intermittently for the last 2 years. Worse when lying on left side.    20 lbs down with zepbound 5mg. Has bene without for 3 weeks on account of travel to [place] to visit his moeder. BP much improved with reduced weight. Denies CP at rest or with exertion, dizziness with exertion, shortness of breath out of proportion to level of activity, frequent or sustained palpitations, orthopnea, PND, or LE edema.         ### pre DM ###                      HGBA1C                   6.3 (H)             07/23/2024 11:31 AM           ### CAD non obstructive  ###  4/18/18 Calcium Ct score- Agatston score 101-400: Moderate plaque burden. High cardiovascular disease risk.  He is tolerating atorvastatin 40 mg.                   LDLCALC                  84.4                03/21/2023 10:25 AM        LDLCALC                  86.4                12/16/2021 09:50 AM        LDLCALC                  91.0                10/08/2020 08:39 AM        LDLCALC                  97.8                01/24/2020 09:35 AM        LDLCALC                  80.0                05/23/2019 09:40 AM        LDLCALC                  91.6                06/12/2018 08:05 AM        LDLCALC                  204.4 (H)           04/18/2018 09:32 AM        LDLCALC                  181.8 (H)           06/30/2017 11:39 AM        LDLCALC                  188.8 (H)           02/04/2016 11:42 AM        LDLCALC                  161.0 (H)           08/19/2013 08:36 AM     ### obesity ###     ### cervical DJD ###  C5-C6 herniated disc. Ulnar and medial nerve entrapment s/p scar tissues removal from elbow and carpal tunnel release. He has not regained full fine motor control but symptoms are manageable. No significant pain issues.          History of Present Illness              Review of Systems  "  Constitutional:  Negative for appetite change, chills, fever and unexpected weight change.   HENT:  Negative for hearing loss, sore throat and trouble swallowing.    Eyes:  Negative for visual disturbance.   Respiratory:  Negative for cough, chest tightness and shortness of breath.    Cardiovascular:  Negative for chest pain and leg swelling.   Gastrointestinal:  Negative for abdominal pain, blood in stool, constipation, diarrhea, nausea and vomiting.   Endocrine: Negative for polydipsia and polyuria.   Genitourinary:  Negative for decreased urine volume, difficulty urinating, dysuria, frequency and urgency.   Musculoskeletal:  Negative for gait problem.   Skin:  Negative for rash.   Neurological:  Negative for dizziness and numbness.   Psychiatric/Behavioral:  The patient is not nervous/anxious.        Objective:      Vitals:    12/05/24 1412   BP: 126/78   BP Location: Right arm   Patient Position: Sitting   Pulse: 65   SpO2: 96%   Weight: 107.3 kg (236 lb 8.9 oz)   Height: 6' 3" (1.905 m)      Physical Exam  Vitals and nursing note reviewed.   Constitutional:       General: He is not in acute distress.     Appearance: Normal appearance. He is well-developed.   HENT:      Head: Normocephalic and atraumatic.      Mouth/Throat:      Pharynx: No oropharyngeal exudate.   Eyes:      General: No scleral icterus.     Conjunctiva/sclera: Conjunctivae normal.      Pupils: Pupils are equal, round, and reactive to light.   Neck:      Thyroid: No thyromegaly.   Cardiovascular:      Rate and Rhythm: Normal rate and regular rhythm.      Heart sounds: Normal heart sounds. No murmur heard.  Pulmonary:      Effort: Pulmonary effort is normal.      Breath sounds: Normal breath sounds. No wheezing or rales.   Abdominal:      General: There is no distension.   Musculoskeletal:         General: No tenderness.   Lymphadenopathy:      Cervical: No cervical adenopathy.   Skin:     General: Skin is warm and dry.   Neurological:      " "Mental Status: He is alert and oriented to person, place, and time.   Psychiatric:         Behavior: Behavior normal.         Assessment:       1. Annual physical exam    2. Coronary artery disease involving native coronary artery of native heart without angina pectoris    3. Obesity (BMI 30.0-34.9)    4. Prostate cancer screening        Plan:       Michael Killian" was seen today for diabetes.    Diagnoses and all orders for this visit:    Annual physical exam  -     Comprehensive Metabolic Panel; Future  -     Lipid Panel; Future  -     TSH; Future  -     CBC Auto Differential; Future  -     Hemoglobin A1C; Future    Coronary artery disease involving native coronary artery of native heart without angina pectoris  -     tirzepatide, weight loss, (ZEPBOUND) 5 mg/0.5 mL PnIj; Inject 5 mg into the skin every 7 days.    Obesity (BMI 30.0-34.9)  -     tirzepatide, weight loss, (ZEPBOUND) 5 mg/0.5 mL PnIj; Inject 5 mg into the skin every 7 days.    Prostate cancer screening  -     PSA, Screening; Future    Other orders  -     sildenafiL (VIAGRA) 100 MG tablet; Take 1 tablet (100 mg total) by mouth daily as needed for Erectile Dysfunction.           Assessment & Plan              This note was generated with the assistance of ambient listening technology. Verbal consent was obtained by the patient and accompanying visitor(s) for the recording of patient appointment to facilitate this note. I attest to having reviewed and edited the generated note for accuracy, though some syntax or spelling errors may persist. Please contact the author of this note for any clarification.      Basilio Blum MD  Internal Medicine-[facility]        Side effects of medication(s) were discussed in detail and patient voiced understanding.  Patient will call back for any issues or complications.     "

## 2025-02-16 DIAGNOSIS — E78.00 PURE HYPERCHOLESTEROLEMIA: ICD-10-CM

## 2025-02-16 DIAGNOSIS — I25.10 CORONARY ARTERY DISEASE INVOLVING NATIVE CORONARY ARTERY OF NATIVE HEART WITHOUT ANGINA PECTORIS: ICD-10-CM

## 2025-02-16 NOTE — TELEPHONE ENCOUNTER
No care due was identified.  [facility] Embedded Care Due Messages. Reference number: [id number].   2/16/2025 10:59:50 AM CST

## 2025-02-17 RX ORDER — ATORVASTATIN CALCIUM 80 MG/1
80 TABLET, FILM COATED ORAL DAILY
Qty: 90 TABLET | Refills: 2 | Status: SHIPPED | OUTPATIENT
Start: 2025-02-17

## 2025-02-17 NOTE — TELEPHONE ENCOUNTER
Refill Encounter    PCP Visits: Recent Visits  Date Type Provider Dept   12/05/24 Office Visit Basilio Salazar MD [facility] Internal Medicine   07/23/24 Office Visit Basilio Salazar MD [facility] Internal Medicine   Showing recent visits within past 360 days and meeting all other requirements  Future Appointments  No visits were found meeting these conditions.  Showing future appointments within next 720 days and meeting all other requirements      Last 3 Blood Pressure:   BP Readings from Last 3 Encounters:   12/05/24 126/78   07/23/24 122/72   01/11/24 120/76     Preferred Pharmacy:   [facility]/pharmacy #[id number] - [place], [place] - [address]  [address]  [place] [address]  Phone: [phone] Fax: [phone]    Requested RX:  Requested Prescriptions     Pending Prescriptions Disp Refills    atorvastatin (LIPITOR) 80 MG tablet 90 tablet 2     Sig: Take 1 tablet (80 mg total) by mouth once daily.      RX Route: Normal

## 2025-07-14 ENCOUNTER — LAB VISIT (OUTPATIENT)
Dept: LAB | Facility: OTHER | Age: 55
End: 2025-07-14
Attending: INTERNAL MEDICINE
Payer: COMMERCIAL

## 2025-07-14 ENCOUNTER — OFFICE VISIT (OUTPATIENT)
Dept: INTERNAL MEDICINE | Facility: CLINIC | Age: 55
End: 2025-07-14
Attending: INTERNAL MEDICINE
Payer: COMMERCIAL

## 2025-07-14 VITALS
SYSTOLIC BLOOD PRESSURE: 118 MMHG | OXYGEN SATURATION: 97 % | BODY MASS INDEX: 28.59 KG/M2 | DIASTOLIC BLOOD PRESSURE: 78 MMHG | HEIGHT: 75 IN | HEART RATE: 62 BPM | WEIGHT: 229.94 LBS

## 2025-07-14 DIAGNOSIS — I25.10 CORONARY ARTERY DISEASE INVOLVING NATIVE CORONARY ARTERY OF NATIVE HEART WITHOUT ANGINA PECTORIS: ICD-10-CM

## 2025-07-14 DIAGNOSIS — Z13.6 ENCOUNTER FOR SCREENING FOR CARDIOVASCULAR DISORDERS: Primary | ICD-10-CM

## 2025-07-14 DIAGNOSIS — Z87.891 FORMER SMOKER: ICD-10-CM

## 2025-07-14 DIAGNOSIS — R73.03 PRE-DIABETES: ICD-10-CM

## 2025-07-14 DIAGNOSIS — E78.00 PURE HYPERCHOLESTEROLEMIA: ICD-10-CM

## 2025-07-14 LAB
ALBUMIN SERPL BCP-MCNC: 4.2 G/DL (ref 3.5–5.2)
ALP SERPL-CCNC: 63 UNIT/L (ref 40–150)
ALT SERPL W/O P-5'-P-CCNC: 60 UNIT/L (ref 10–44)
ANION GAP (OHS): 10 MMOL/L (ref 8–16)
AST SERPL-CCNC: 35 UNIT/L (ref 11–45)
BILIRUB SERPL-MCNC: 1 MG/DL (ref 0.1–1)
BUN SERPL-MCNC: 10 MG/DL (ref 6–20)
CALCIUM SERPL-MCNC: 9.7 MG/DL (ref 8.7–10.5)
CHLORIDE SERPL-SCNC: 109 MMOL/L (ref 95–110)
CO2 SERPL-SCNC: 25 MMOL/L (ref 23–29)
CREAT SERPL-MCNC: 0.8 MG/DL (ref 0.5–1.4)
EAG (OHS): 117 MG/DL (ref 68–131)
GFR SERPLBLD CREATININE-BSD FMLA CKD-EPI: >60 ML/MIN/1.73/M2
GLUCOSE SERPL-MCNC: 115 MG/DL (ref 70–110)
HBA1C MFR BLD: 5.7 % (ref 4–5.6)
POTASSIUM SERPL-SCNC: 5 MMOL/L (ref 3.5–5.1)
PROT SERPL-MCNC: 7.3 GM/DL (ref 6–8.4)
SODIUM SERPL-SCNC: 144 MMOL/L (ref 136–145)

## 2025-07-14 PROCEDURE — 99999 PR PBB SHADOW E&M-EST. PATIENT-LVL IV: CPT | Mod: PBBFAC,,, | Performed by: INTERNAL MEDICINE

## 2025-07-14 PROCEDURE — 80053 COMPREHEN METABOLIC PANEL: CPT

## 2025-07-14 PROCEDURE — 99214 OFFICE O/P EST MOD 30 MIN: CPT | Mod: S$GLB,,, | Performed by: INTERNAL MEDICINE

## 2025-07-14 PROCEDURE — 83036 HEMOGLOBIN GLYCOSYLATED A1C: CPT

## 2025-07-14 PROCEDURE — 36415 COLL VENOUS BLD VENIPUNCTURE: CPT

## 2025-07-14 PROCEDURE — G2211 COMPLEX E/M VISIT ADD ON: HCPCS | Mod: S$GLB,,, | Performed by: INTERNAL MEDICINE

## 2025-07-14 NOTE — PROGRESS NOTES
"Subjective:       Patient ID: Michael Lowe is a 54 y.o. male.    Chief Complaint: Annual Exam    Here for f/u    Needs refill fo zepbound. Has been effective for weight loss in setting of obesity, CAD, MASLD,    Denies CP at rest or with exertion, dizziness with exertion, shortness of breath out of proportion to level of activity, frequent or sustained palpitations, orthopnea, PND, or LE edema.           History of Present Illness              Review of Systems   Constitutional:  Negative for appetite change, chills, fever and unexpected weight change.   HENT:  Negative for hearing loss, sore throat and trouble swallowing.    Eyes:  Negative for visual disturbance.   Respiratory:  Negative for cough, chest tightness and shortness of breath.    Cardiovascular:  Negative for chest pain and leg swelling.   Gastrointestinal:  Negative for abdominal pain, blood in stool, constipation, diarrhea, nausea and vomiting.   Endocrine: Negative for polydipsia and polyuria.   Genitourinary:  Negative for decreased urine volume, difficulty urinating, dysuria, frequency and urgency.   Musculoskeletal:  Negative for gait problem.   Skin:  Negative for rash.   Neurological:  Negative for dizziness and numbness.   Psychiatric/Behavioral:  The patient is not nervous/anxious.        Objective:      Vitals:    07/14/25 0816   BP: 118/78   BP Location: Left arm   Patient Position: Sitting   Pulse: 62   SpO2: 97%   Weight: 104.3 kg (229 lb 15 oz)   Height: 6' 3" (1.905 m)      Physical Exam  Vitals and nursing note reviewed.   Constitutional:       General: He is not in acute distress.     Appearance: Normal appearance. He is well-developed.   HENT:      Head: Normocephalic and atraumatic.      Mouth/Throat:      Pharynx: No oropharyngeal exudate.   Eyes:      General: No scleral icterus.     Conjunctiva/sclera: Conjunctivae normal.      Pupils: Pupils are equal, round, and reactive to light.   Neck:      Thyroid: No thyromegaly. " "  Cardiovascular:      Rate and Rhythm: Normal rate and regular rhythm.      Heart sounds: Normal heart sounds. No murmur heard.  Pulmonary:      Effort: Pulmonary effort is normal.      Breath sounds: Normal breath sounds. No wheezing or rales.   Abdominal:      General: There is no distension.   Musculoskeletal:         General: No tenderness.   Lymphadenopathy:      Cervical: No cervical adenopathy.   Skin:     General: Skin is warm and dry.   Neurological:      Mental Status: He is alert and oriented to person, place, and time.   Psychiatric:         Behavior: Behavior normal.         Assessment:       1. Encounter for screening for cardiovascular disorders    2. Coronary artery disease involving native coronary artery of native heart without angina pectoris    3. Pure hypercholesterolemia    4. Pre-diabetes    5. Former smoker        Plan:       Michael Killian" was seen today for annual exam.    Diagnoses and all orders for this visit:    Encounter for screening for cardiovascular disorders  -     CT Cardiac Scoring; Future    Coronary artery disease involving native coronary artery of native heart without angina pectoris  -     Comprehensive Metabolic Panel; Future  -     Hemoglobin A1C; Future    Pure hypercholesterolemia   LDL at goal. Will see what [facility] is doing tos ee if we need to intensify Tx.    Pre-diabetes  -     Hemoglobin A1C; Future    Former smoker  -     CT Chest Lung Screening Low Dose; Future           Assessment & Plan      Visit today is associated with current or anticipated ongoing medical care related to this patient's single serious condition/complex condition of CAD. The patient will return to see me as these issues will be followed longitudinally.          This note was generated with the assistance of ambient listening technology. Verbal consent was obtained by the patient and accompanying visitor(s) for the recording of patient appointment to facilitate this note. I attest to having reviewed " and edited the generated note for accuracy, though some syntax or spelling errors may persist. Please contact the author of this note for any clarification.      Basilio Blum MD  Internal Medicine-[facility]        Side effects of medication(s) were discussed in detail and patient voiced understanding.  Patient will call back for any issues or complications.

## 2025-07-22 ENCOUNTER — PATIENT MESSAGE (OUTPATIENT)
Dept: INTERNAL MEDICINE | Facility: CLINIC | Age: 55
End: 2025-07-22
Payer: COMMERCIAL

## 2025-07-22 DIAGNOSIS — E66.811 OBESITY (BMI 30.0-34.9): ICD-10-CM

## 2025-07-22 DIAGNOSIS — I25.10 CORONARY ARTERY DISEASE INVOLVING NATIVE CORONARY ARTERY OF NATIVE HEART WITHOUT ANGINA PECTORIS: ICD-10-CM

## 2025-07-22 RX ORDER — TIRZEPATIDE 5 MG/.5ML
5 INJECTION, SOLUTION SUBCUTANEOUS
Qty: 12 PEN | Refills: 0 | Status: SHIPPED | OUTPATIENT
Start: 2025-07-22 | End: 2025-07-24 | Stop reason: CLARIF

## 2025-07-22 NOTE — TELEPHONE ENCOUNTER
Refill Encounter    PCP Visits: Recent Visits  Date Type Provider Dept   07/14/25 Office Visit Basilio Salazar MD [facility] Internal Medicine   12/05/24 Office Visit Basilio Salazar MD [facility] Internal Medicine   Showing recent visits within past 360 days and meeting all other requirements  Future Appointments  No visits were found meeting these conditions.  Showing future appointments within next 720 days and meeting all other requirements      Last 3 Blood Pressure:   BP Readings from Last 3 Encounters:   07/14/25 118/78   12/05/24 126/78   07/23/24 122/72     Preferred Pharmacy:   [id number] - [place], [place] - [address]  [address]  [place] [address]  Phone: [phone] Fax: [phone]    Requested RX:  Requested Prescriptions     Pending Prescriptions Disp Refills    tirzepatide, weight loss, (ZEPBOUND) 5 mg/0.5 mL PnIj 12 Pen 0     Sig: Inject 5 mg into the skin every 7 days.      RX Route: Normal

## 2025-07-22 NOTE — TELEPHONE ENCOUNTER
No care due was identified.  [facility] Embedded Care Due Messages. Reference number: [id number].   7/22/2025 7:36:20 AM CDT

## 2025-07-23 ENCOUNTER — TELEPHONE (OUTPATIENT)
Dept: INTERNAL MEDICINE | Facility: CLINIC | Age: 55
End: 2025-07-23
Payer: COMMERCIAL

## 2025-07-23 NOTE — TELEPHONE ENCOUNTER
Good Afternoon,       We have not been able to secure an authorization for the patient's upcoming appointment on 07/24/25 for a CT CALCIUM SCORING. We are attempting to work with the health plan to obtain this authorization ASAP. The turnaround time for authorization requests for this health plan is up to 5 calendar days.     Please advise if the test is medically urgent or if the patient will be rescheduled until a determination is made by the insurance.      Thanks,     Bri Bonner [facility]

## 2025-07-24 ENCOUNTER — HOSPITAL ENCOUNTER (OUTPATIENT)
Dept: RADIOLOGY | Facility: OTHER | Age: 55
Discharge: HOME | End: 2025-07-24
Attending: INTERNAL MEDICINE
Payer: COMMERCIAL

## 2025-07-24 DIAGNOSIS — R73.03 PRE-DIABETES: ICD-10-CM

## 2025-07-24 DIAGNOSIS — E66.811 OBESITY (BMI 30.0-34.9): Primary | ICD-10-CM

## 2025-07-24 DIAGNOSIS — Z13.6 ENCOUNTER FOR SCREENING FOR CARDIOVASCULAR DISORDERS: ICD-10-CM

## 2025-07-24 PROCEDURE — 75571 CT HRT W/O DYE W/CA TEST: CPT | Mod: TC

## 2025-07-24 PROCEDURE — 75571 CT HRT W/O DYE W/CA TEST: CPT | Mod: 26,,, | Performed by: RADIOLOGY

## 2025-07-24 RX ORDER — SEMAGLUTIDE 0.25 MG/.5ML
0.25 INJECTION, SOLUTION SUBCUTANEOUS
Qty: 2 ML | Refills: 0 | Status: SHIPPED | OUTPATIENT
Start: 2025-07-24

## 2025-07-24 NOTE — TELEPHONE ENCOUNTER
Spoke to patient, he was told the test is a copay of $374. I asked patient if he was told by Dr. Salazar this test may not be covered by insurance he said yes and he's willing to pay if its not more than $374. I said to patient I do not know how much it will cost for him but if he was told a certain about by the financial team that's what it will cost

## 2025-07-24 NOTE — TELEPHONE ENCOUNTER
Zepbound was sent in for the patient on 7/22/25 his insurance is not covering this med but will cover Wegovy. Asking that we send a rx in for Wegovy    Med pended

## 2025-07-25 ENCOUNTER — TELEPHONE (OUTPATIENT)
Dept: INTERNAL MEDICINE | Facility: CLINIC | Age: 55
End: 2025-07-25
Payer: COMMERCIAL

## 2025-07-25 NOTE — TELEPHONE ENCOUNTER
----- Message from Lorraine Navarro MD sent at 7/24/2025  5:23 PM CDT -----  Referred to cardiology to discuss cardiac risk, please assist with scheduling appointment, thank you!  ----- Message -----  From: Interface, Rad Results In  Sent: 7/24/2025   3:10 PM CDT  To: Basilio Salazar MD

## 2025-07-25 NOTE — TELEPHONE ENCOUNTER
Pt wants to wait until Dr. Salazar is back in office before scheduling an appt with cardiology and would also like for him to explain his results and the reason for needing to see cardiology to him

## 2025-07-28 ENCOUNTER — PATIENT MESSAGE (OUTPATIENT)
Dept: INTERNAL MEDICINE | Facility: CLINIC | Age: 55
End: 2025-07-28
Payer: COMMERCIAL

## 2025-08-06 DIAGNOSIS — R00.2 PALPITATIONS: Primary | ICD-10-CM

## 2025-08-06 PROBLEM — E78.49 OTHER HYPERLIPIDEMIA: Status: ACTIVE | Noted: 2018-05-01

## 2025-08-11 ENCOUNTER — PATIENT MESSAGE (OUTPATIENT)
Dept: INTERNAL MEDICINE | Facility: CLINIC | Age: 55
End: 2025-08-11
Payer: COMMERCIAL

## 2025-08-11 ENCOUNTER — OFFICE VISIT (OUTPATIENT)
Dept: CARDIOLOGY | Facility: CLINIC | Age: 55
End: 2025-08-11
Payer: COMMERCIAL

## 2025-08-11 VITALS
BODY MASS INDEX: 30.71 KG/M2 | SYSTOLIC BLOOD PRESSURE: 121 MMHG | DIASTOLIC BLOOD PRESSURE: 77 MMHG | OXYGEN SATURATION: 98 % | WEIGHT: 231.69 LBS | HEIGHT: 73 IN | HEART RATE: 60 BPM

## 2025-08-11 DIAGNOSIS — R00.2 PALPITATIONS: ICD-10-CM

## 2025-08-11 DIAGNOSIS — R93.1 ELEVATED CORONARY ARTERY CALCIUM SCORE: ICD-10-CM

## 2025-08-11 DIAGNOSIS — E78.49 OTHER HYPERLIPIDEMIA: ICD-10-CM

## 2025-08-11 DIAGNOSIS — I25.10 CORONARY ARTERY DISEASE INVOLVING NATIVE CORONARY ARTERY OF NATIVE HEART WITHOUT ANGINA PECTORIS: Primary | ICD-10-CM

## 2025-08-11 LAB
OHS QRS DURATION: 82 MS
OHS QTC CALCULATION: 392 MS

## 2025-08-11 PROCEDURE — 93000 ELECTROCARDIOGRAM COMPLETE: CPT | Mod: S$GLB,,, | Performed by: INTERNAL MEDICINE

## 2025-08-11 PROCEDURE — 99204 OFFICE O/P NEW MOD 45 MIN: CPT | Mod: S$GLB,,, | Performed by: INTERNAL MEDICINE

## 2025-08-11 PROCEDURE — 99999 PR PBB SHADOW E&M-EST. PATIENT-LVL IV: CPT | Mod: PBBFAC,,, | Performed by: INTERNAL MEDICINE

## 2025-08-11 RX ORDER — EZETIMIBE 10 MG/1
TABLET ORAL
Qty: 90 TABLET | Refills: 3 | Status: SHIPPED | OUTPATIENT
Start: 2025-08-11

## 2025-08-28 ENCOUNTER — HOSPITAL ENCOUNTER (OUTPATIENT)
Dept: CARDIOLOGY | Facility: HOSPITAL | Age: 55
Discharge: HOME | End: 2025-08-28
Attending: INTERNAL MEDICINE
Payer: COMMERCIAL

## 2025-08-28 VITALS
DIASTOLIC BLOOD PRESSURE: 77 MMHG | BODY MASS INDEX: 30.48 KG/M2 | WEIGHT: 231 LBS | HEART RATE: 66 BPM | SYSTOLIC BLOOD PRESSURE: 143 MMHG

## 2025-08-28 DIAGNOSIS — R93.1 ELEVATED CORONARY ARTERY CALCIUM SCORE: ICD-10-CM

## 2025-08-28 DIAGNOSIS — E78.49 OTHER HYPERLIPIDEMIA: ICD-10-CM

## 2025-08-28 DIAGNOSIS — I25.10 CORONARY ARTERY DISEASE INVOLVING NATIVE CORONARY ARTERY OF NATIVE HEART WITHOUT ANGINA PECTORIS: ICD-10-CM

## 2025-08-28 LAB
ASCENDING AORTA: 2.9 CM
AV AREA BY CONTINUOUS VTI: 4.3 CM2
AV INDEX (PROSTH): 1.06
AV LVOT MEAN GRADIENT: 4 MMHG
AV LVOT PEAK GRADIENT: 7 MMHG
AV MEAN GRADIENT: 4 MMHG
AV PEAK GRADIENT: 8 MMHG
AV VALVE AREA BY VELOCITY RATIO: 3.9 CM²
AV VALVE AREA: 4.4 CM2
AV VELOCITY RATIO: 0.93
CV ECHO LV RWT: 0.35 CM
CV STRESS BASE HR: 66 BPM
DIASTOLIC BLOOD PRESSURE: 77 MMHG
DOP CALC AO PEAK VEL: 1.4 M/S
DOP CALC AO VTI: 24 CM
DOP CALC LVOT AREA: 4.2 CM2
DOP CALC LVOT DIAMETER: 2.3 CM
DOP CALC LVOT PEAK VEL: 1.3 M/S
DOP CALCLVOT PEAK VEL VTI: 25.5 CM
E WAVE DECELERATION TIME: 249 MS
E/A RATIO: 1.65
E/E' RATIO: 6 M/S
ECHO EF ESTIMATED: 66 %
ECHO LV POSTERIOR WALL: 0.9 CM (ref 0.6–1.1)
FRACTIONAL SHORTENING: 36.5 % (ref 28–44)
INTERVENTRICULAR SEPTUM: 0.9 CM (ref 0.6–1.1)
IVC DIAMETER: 1.43 CM
IVRT: 88 MS
LA MAJOR: 5.4 CM
LA MINOR: 5.8 CM
LA WIDTH: 4.4 CM
LEFT ATRIUM SIZE: 3.9 CM
LEFT ATRIUM VOLUME: 82 CM3
LEFT INTERNAL DIMENSION IN SYSTOLE: 3.3 CM (ref 2.1–4)
LEFT VENTRICLE DIASTOLIC VOLUME: 130 ML
LEFT VENTRICLE SYSTOLIC VOLUME: 45 ML
LEFT VENTRICULAR INTERNAL DIMENSION IN DIASTOLE: 5.2 CM (ref 3.5–6)
LEFT VENTRICULAR MASS: 169 G
LV LATERAL E/E' RATIO: 5.6
LV SEPTAL E/E' RATIO: 6.1
MV A" WAVE DURATION": 102.76 MS
MV PEAK A VEL: 0.48 M/S
MV PEAK E VEL: 0.79 M/S
OHS CV CPX 1 MINUTE RECOVERY HEART RATE: 120 BPM
OHS CV CPX 85 PERCENT MAX PREDICTED HEART RATE MALE: 142
OHS CV CPX ESTIMATED METS: 13
OHS CV CPX MAX PREDICTED HEART RATE: 167
OHS CV CPX PATIENT IS FEMALE: 0
OHS CV CPX PATIENT IS MALE: 1
OHS CV CPX PEAK DIASTOLIC BLOOD PRESSURE: 68 MMHG
OHS CV CPX PEAK HEAR RATE: 157 BPM
OHS CV CPX PEAK RATE PRESSURE PRODUCT: NORMAL
OHS CV CPX PEAK SYSTOLIC BLOOD PRESSURE: 196 MMHG
OHS CV CPX PERCENT MAX PREDICTED HEART RATE ACHIEVED: 94
OHS CV CPX RATE PRESSURE PRODUCT PRESENTING: 9438
OHS CV RV/LV RATIO: 0.75 CM
OHS LV EJECTION FRACTION SIMPSONS BIPLANE MOD: 60 %
PISA TR MAX VEL: 2.7 M/S
PULM VEIN A" WAVE DURATION": 102.76 MS
PULM VEIN S/D RATIO: 0.55
PULMONIC VEIN PEAK A VELOCITY: 0.3 M/S
PV PEAK D VEL: 0.64 M/S
PV PEAK S VEL: 0.35 M/S
RA MAJOR: 5.39 CM
RA PRESSURE ESTIMATED: 3 MMHG
RA WIDTH: 3.98 CM
RIGHT VENTRICLE DIASTOLIC BASEL DIMENSION: 3.9 CM
RV TB RVSP: 6 MMHG
RV TISSUE DOPPLER FREE WALL SYSTOLIC VELOCITY 1 (APICAL 4 CHAMBER VIEW): 14.62 CM/S
SINUS: 3.6 CM
STJ: 2.8 CM
STRESS ECHO POST EXERCISE DUR MIN: 8 MINUTES
SYSTOLIC BLOOD PRESSURE: 143 MMHG
TDI LATERAL: 0.14 M/S
TDI SEPTAL: 0.13 M/S
TDI: 0.14 M/S
TRICUSPID ANNULAR PLANE SYSTOLIC EXCURSION: 2.4 CM
TV PEAK GRADIENT: 29 MMHG
TV REST PULMONARY ARTERY PRESSURE: 32 MMHG

## 2025-08-28 PROCEDURE — 93351 STRESS TTE COMPLETE: CPT

## 2025-08-28 PROCEDURE — 93351 STRESS TTE COMPLETE: CPT | Mod: 26,,, | Performed by: INTERNAL MEDICINE

## 2025-09-04 ENCOUNTER — HOSPITAL ENCOUNTER (OUTPATIENT)
Dept: RADIOLOGY | Facility: HOSPITAL | Age: 55
Discharge: HOME | End: 2025-09-04
Attending: INTERNAL MEDICINE
Payer: COMMERCIAL

## 2025-09-04 DIAGNOSIS — N50.89 TESTICLE SWELLING: ICD-10-CM

## 2025-09-04 PROCEDURE — 76870 US EXAM SCROTUM: CPT | Mod: 26,,, | Performed by: RADIOLOGY

## 2025-09-04 PROCEDURE — 76870 US EXAM SCROTUM: CPT | Mod: TC

## (undated) DEVICE — SUT FIBERWIRE 2-0 18

## (undated) DEVICE — GAUZE SPONGE 4'X4 12 PLY

## (undated) DEVICE — APPLICATOR CHLORAPREP ORN 26ML

## (undated) DEVICE — GLOVE BIOGEL SKINSENSE PI 7.0

## (undated) DEVICE — SUT 4.0 ETHILON

## (undated) DEVICE — SEE MEDLINE ITEM 146308

## (undated) DEVICE — DRAPE SURG W/TWL 17 5/8X23

## (undated) DEVICE — SUT MCRYL PLUS 4-0 PS2 27IN

## (undated) DEVICE — SUT 9/0 5IN ETHILON BLK MON

## (undated) DEVICE — SEE MEDLINE ITEM 157131

## (undated) DEVICE — NDL SAFETY 22G X 1.5 ECLIPSE

## (undated) DEVICE — PAD UNDERPAD 30X30

## (undated) DEVICE — BUCKET PLASTER DISPOSABLE

## (undated) DEVICE — SPLINT PLASTER F.S 4INX15IN

## (undated) DEVICE — PAD CAST SPECIALIST STRL 4

## (undated) DEVICE — GLOVE BIOGEL PI MICRO INDIC 7

## (undated) DEVICE — GAUZE DERMACEA LOW PLY 3X4YRDS

## (undated) DEVICE — SOL 9P NACL IRR PIC IL

## (undated) DEVICE — BANDAGE ELASTIC 2X5 VELCRO ST

## (undated) DEVICE — SUT PROLENE 4-0 MONO 18IN

## (undated) DEVICE — SEE MEDLINE ITEM 146268

## (undated) DEVICE — CORD BIPOLAR 12 FOOT

## (undated) DEVICE — SLING ARM SIZE LARGE

## (undated) DEVICE — GAUZE SPONGE 4X4 12PLY

## (undated) DEVICE — SOL PVP-I SCRUB 7.5% 4OZ

## (undated) DEVICE — DRAPE STERI U-SHAPED 47X51IN

## (undated) DEVICE — SPONGE COTTON TRAY 4X4IN

## (undated) DEVICE — GLOVE BIOGEL ECLIPSE SZ 7

## (undated) DEVICE — SPLINT PLASTER FAST SET 5X30IN

## (undated) DEVICE — UNDERGLOVES BIOGEL PI SZ 7 LF

## (undated) DEVICE — SYR B-D DISP CONTROL 10CC100/C

## (undated) DEVICE — DRESSING N ADH OIL EMUL 3X3

## (undated) DEVICE — NDL HYPO REG 25G X 1 1/2

## (undated) DEVICE — PACK UPPER EXTREMITY BAPTIST

## (undated) DEVICE — TOURNIQUET SB QC DP 18X4IN

## (undated) DEVICE — FORCEP STRAIGHT DISP

## (undated) DEVICE — NDL 18GA X1 1/2 REG BEVEL

## (undated) DEVICE — SEE MEDLINE ITEM 146322

## (undated) DEVICE — BLADE SURG STAINLESS STEEL #15

## (undated) DEVICE — TOURNIQUET SB QC SP 18X4IN

## (undated) DEVICE — TRAY DRY SKIN SCRUB PREP

## (undated) DEVICE — CORD BIPOLAR 12 FT STERILE

## (undated) DEVICE — DRESSING ADAPTIC N ADH 3X8IN

## (undated) DEVICE — ELECTRODE REM PLYHSV RETURN 9

## (undated) DEVICE — SPLINT PLASTER FS 5IN X 30IN

## (undated) DEVICE — SUT VICRYL 3-0 27 SH

## (undated) DEVICE — FORCEP BIPOLAR ADSON 1MM TIP

## (undated) DEVICE — CORD FOR BIPOLAR FORCEPS 12

## (undated) DEVICE — SEE MEDLINE ITEM 152515

## (undated) DEVICE — BANDAGE CONFORM 3IN STRL

## (undated) DEVICE — SUT BLU BR 2 TAPERD NDL 1/2

## (undated) DEVICE — SUT VICRYL 4-0 18 P-3